# Patient Record
Sex: FEMALE | Race: WHITE | HISPANIC OR LATINO | Employment: OTHER | URBAN - METROPOLITAN AREA
[De-identification: names, ages, dates, MRNs, and addresses within clinical notes are randomized per-mention and may not be internally consistent; named-entity substitution may affect disease eponyms.]

---

## 2017-11-10 DIAGNOSIS — Z12.11 COLON CANCER SCREENING: ICD-10-CM

## 2018-02-26 ENCOUNTER — OFFICE VISIT (OUTPATIENT)
Dept: FAMILY MEDICINE | Facility: CLINIC | Age: 58
End: 2018-02-26
Payer: MEDICAID

## 2018-02-26 VITALS
DIASTOLIC BLOOD PRESSURE: 78 MMHG | HEIGHT: 67 IN | OXYGEN SATURATION: 96 % | BODY MASS INDEX: 33.89 KG/M2 | SYSTOLIC BLOOD PRESSURE: 118 MMHG | HEART RATE: 83 BPM | TEMPERATURE: 98 F | WEIGHT: 215.94 LBS

## 2018-02-26 DIAGNOSIS — M79.602 LEFT ARM PAIN: Primary | ICD-10-CM

## 2018-02-26 DIAGNOSIS — M25.512 ACUTE PAIN OF LEFT SHOULDER: ICD-10-CM

## 2018-02-26 PROCEDURE — 99213 OFFICE O/P EST LOW 20 MIN: CPT | Mod: PBBFAC,PO,25 | Performed by: FAMILY MEDICINE

## 2018-02-26 PROCEDURE — 99214 OFFICE O/P EST MOD 30 MIN: CPT | Mod: S$PBB,,, | Performed by: FAMILY MEDICINE

## 2018-02-26 PROCEDURE — 93010 ELECTROCARDIOGRAM REPORT: CPT | Mod: ,,, | Performed by: INTERNAL MEDICINE

## 2018-02-26 PROCEDURE — 99999 PR PBB SHADOW E&M-EST. PATIENT-LVL III: CPT | Mod: PBBFAC,,, | Performed by: FAMILY MEDICINE

## 2018-02-26 PROCEDURE — 93005 ELECTROCARDIOGRAM TRACING: CPT | Mod: PBBFAC,PO | Performed by: FAMILY MEDICINE

## 2018-02-26 PROCEDURE — 3008F BODY MASS INDEX DOCD: CPT | Mod: ,,, | Performed by: FAMILY MEDICINE

## 2018-02-26 RX ORDER — TIZANIDINE 2 MG/1
2 TABLET ORAL EVERY 6 HOURS PRN
Qty: 30 TABLET | Refills: 1 | Status: SHIPPED | OUTPATIENT
Start: 2018-02-26 | End: 2018-04-13

## 2018-02-26 NOTE — PROGRESS NOTES
Office Visit    Patient Name: Cloie Bodden Reyes    : 1960  MRN: [id number]      Assessment/Plan:  Cloie Bodden Reyes is a 57 y.o. female who presents today for :    Left arm pain  -     tiZANidine (ZANAFLEX) 2 MG tablet; Take 1 tablet (2 mg total) by mouth every 6 (six) hours as needed.  Dispense: 30 tablet; Refill: 1  -     EKG 12-lead    Acute pain of left shoulder    -activity modification d/w patient: avoid reaching overhead activities, avoid heavy lifting and provocative movements, alternate ice/heat/deep massage  -shoulder ROM stretching and strengthening exercises demonstrated in clinic and handouts given to patient  -RTC in 4-6 weeks if not better, or sooner if pain worsens.  -patient advised to use OTC Tylenol (325mg tablets) once every 4-6 hours as needed for pain - avoid regular use of NSAIDs due to potential GI/BP side effects (may take a short-course Aleve and then stop).   -EKG normal with NSR      Follow-up for worsening Sx. Urgent care/ED precautions provided, follow up with PCP for routine care.     This note was created by combination of typed  and Dragon dictation.  Transcription errors may be present.  If there are any questions, please contact me.        ----------------------------------------------------------------------------------------------------------------------      HPI:  Ciro is a 57 y.o. female who presents today for:    Arm Pain (Left ) and Shoulder Pain      This patient is new to me   This patient has multiple medical diagnoses as noted below.      Patient is here today for  Arm Pain (Left ) and Shoulder Pain  that started about a month ago, but started to worsen the past week.  Denies recent injury/trauma to area. No slips/falls.  Pain has a achy quality that has been on and off, worse at night and with doing household activities such as mopping - has stiffness in the Left shoulder that prevents her from raising her hand above head.  At worse, the pain is 6/10  - sometimes radiates to back of L shoulder blade, also has stiffness of Left posterior neck mm  Resting makes the pain better. No chest pain, claudication.  Pt is still able to perform daily routine    No wt loss/fatigue/malaise/BMs changes or urinary changes/tingling/numbness/weakness.  She has a history of intolerance to Ibuprofen, but had done well with Aleve in the past.        Additional ROS    No dysphagia/sore throat/rhinorrhea  No CP/SOB/palpitations/swelling  No cough/wheezing/SOB  No nausea/vomiting/abd pain  No rash, no history of allergies to any specific substances    There is no problem list on file for this patient.      Current Medications  Medications reviewed/updated.     Current Outpatient Prescriptions on File Prior to Visit   Medication Sig Dispense Refill    loratadine (CLARITIN) 10 mg tablet Take 1 tablet (10 mg total) by mouth daily as needed. 30 tablet 0    meclizine (ANTIVERT) 25 mg tablet Take 25 mg by mouth 3 (three) times daily as needed.      triamcinolone (NASACORT) 55 mcg nasal inhaler 2 sprays by Nasal route once daily.       No current facility-administered medications on file prior to visit.        Past Surgical History:   Procedure Laterality Date    HERNIA REPAIR         Family History   Problem Relation Age of Onset    Cancer Mother      kidney    Diabetes Father     Diabetes Sister     Stroke Brother     Clotting disorder Son     Diabetes Brother     No Known Problems Sister     No Known Problems Son        Social History     Social History    Marital status: Single     Spouse name: N/A    Number of children: N/A    Years of education: N/A     Occupational History    Not on file.     Social History Main Topics    Smoking status: Never Smoker    Smokeless tobacco: Never Used    Alcohol use No    Drug use: No    Sexual activity: Yes     Partners: Male     Other Topics Concern    Not on file     Social History Narrative    No narrative on file  "          Allergies   Review of patient's allergies indicates:   Allergen Reactions    Ibuprofen Other (See Comments)     Leg and hand             Review of Systems  See HPI      Physical Exam  /78   Pulse 83   Temp 98.3 °F (36.8 °C) (Oral)   Ht 5' 7" (1.702 m)   Wt 98 kg (215 lb 15.1 oz)   SpO2 96%   BMI 33.82 kg/m²     GEN: NAD, well developed, pleasant, well nourished  HEENT: NCAT, PERRLA, EOMI, sclera clear, anicteric, O/P clear, MMM with no lesions  NECK: normal, supple with midline trachea, no LAD, no thyromegaly  LUNGS: CTAB, no w/r/r, no increased work of breathing   HEART: RRR, normal S1 and S2, no m/r/g, no edema  ABD: s/nt/nd, NABS  SKIN: normal turgor, no rashes  PSYCH: AOx3, appropriate mood and affect  MSK: warm/well perfused, normal ROM in all 4 extremities, no c/c/e.  L shoulder with decreased ROM due to stiffness and pain, unable to bring L shoulder above horizontal. Normal Flexion/extention/adduction/empty can/push off/internal and external rotation.  No swelling/erythema/TTP.                 "

## 2018-02-28 ENCOUNTER — OFFICE VISIT (OUTPATIENT)
Dept: PODIATRY | Facility: CLINIC | Age: 58
End: 2018-02-28
Payer: MEDICAID

## 2018-02-28 VITALS
SYSTOLIC BLOOD PRESSURE: 122 MMHG | HEIGHT: 67 IN | BODY MASS INDEX: 33.91 KG/M2 | WEIGHT: 216.06 LBS | DIASTOLIC BLOOD PRESSURE: 80 MMHG

## 2018-02-28 DIAGNOSIS — M79.674 PAIN AROUND TOENAIL, RIGHT FOOT: ICD-10-CM

## 2018-02-28 DIAGNOSIS — B35.1 ONYCHOMYCOSIS DUE TO DERMATOPHYTE: Primary | ICD-10-CM

## 2018-02-28 PROCEDURE — 99203 OFFICE O/P NEW LOW 30 MIN: CPT | Mod: S$PBB,,, | Performed by: PODIATRIST

## 2018-02-28 PROCEDURE — 99213 OFFICE O/P EST LOW 20 MIN: CPT | Mod: PBBFAC,PO | Performed by: PODIATRIST

## 2018-02-28 PROCEDURE — 3008F BODY MASS INDEX DOCD: CPT | Mod: ,,, | Performed by: PODIATRIST

## 2018-02-28 PROCEDURE — 99999 PR PBB SHADOW E&M-EST. PATIENT-LVL III: CPT | Mod: PBBFAC,,, | Performed by: PODIATRIST

## 2018-02-28 NOTE — PROGRESS NOTES
Subjective:      Patient ID: Cloie Bodden Reyes is a 57 y.o. female.    Chief Complaint: Toe Pain (pcp Norma 2-26-18) and Nail Problem (R great toe/ tried otc/ unable to take Lamasil d/t abd sx)    Ciro is a 57 y.o. female who presents to the clinic complaining of thick and discolored toenails on the right foot especially the great toe. Has been bothering her for years. She has tried home remedies, vicks, over the counter antifungals as well as prescription topical antifungals. She has used all of them for a year straight. States that the nail becomes brittle while using them but it never clears up. The thickness hurts with every shoe. Files the nail down herself to help but still bothers her. She is unable to take oral lamisil due to abdominal issue and would like to avoid it due to its associated risks. Ciro is inquiring about other treatment options including possibly removing the entire toenail.       Review of Systems   Constitution: Negative for chills and fever.   Cardiovascular: Negative for chest pain, claudication and leg swelling.   Respiratory: Negative for cough and shortness of breath.    Skin: Positive for dry skin and nail changes.   Musculoskeletal:        Toenail pain   Gastrointestinal: Negative for nausea and vomiting.   Neurological: Negative for numbness and paresthesias.   Psychiatric/Behavioral: Negative for altered mental status.           Objective:      Physical Exam   Constitutional: She is oriented to person, place, and time. She appears well-developed and well-nourished.   HENT:   Head: Normocephalic.   Cardiovascular: Intact distal pulses.    Pulses:       Dorsalis pedis pulses are 2+ on the right side, and 2+ on the left side.        Posterior tibial pulses are 2+ on the right side, and 2+ on the left side.   CRT < 3 sec to tips of toes. No edema noted to b/l LE. No vericosities noted to b/l LEs.      Pulmonary/Chest: No respiratory distress.   Musculoskeletal:   Gastrocnemius  equinus noted to b/l ankles with decreased DF noted on exam. MMT 5/5 in DF/PF/Inv/Ev resistance with no reproduction of pain in any direction. Passive range of motion of ankle and pedal joints is painless. Adequate pedal joint ROM.     + pain, mild, with palpation of R great toenail.    Neurological: She is alert and oriented to person, place, and time. She has normal strength. A sensory deficit is present.   Light touch, proprioception, and sharp/dull sensation are all intact bilaterally.      Skin: Skin is warm, dry and intact. No erythema.   No open lesions, lacerations or wounds noted. Nails are thickened, elongated, discolored yellow/brown with subungual debris and brittleness to R 1,3-5. Interdigital spaces clean, dry and intact R. No erythema noted to R foot. Skin texture normal. Pedal hair normal. Skin temperature normal R foot.      Psychiatric: She has a normal mood and affect. Her behavior is normal. Judgment and thought content normal.   Vitals reviewed.            Assessment:       Encounter Diagnoses   Name Primary?    Onychomycosis due to dermatophyte Yes    Pain around toenail, right foot          Plan:       Ciro was seen today for toe pain and nail problem.    Diagnoses and all orders for this visit:    Onychomycosis due to dermatophyte    Pain around toenail, right foot      I counseled the patient on her conditions, their implications and medical management.      Discussed treatment options for fungal toenails including continuation of topical home remedies, topical OTC and Rx medications as well as oral Rx medications. We also discussed total removal of R great toenail permanently. We discussed all pros and cons of each treatment. She is leaning towards permanent removal of the nail due to the ongoing issue an ineffectiveness of topical treatments for last 6 years.     She will be re-scheduled for procedure due to lack of time today.     In the mean time, I advised her to wear wide shoes with  adequate space around toenails to prevent excessive pressure and pain. Keep nails filed short and thin at all times.     RTC for procedure

## 2018-04-13 ENCOUNTER — HOSPITAL ENCOUNTER (OUTPATIENT)
Dept: RADIOLOGY | Facility: HOSPITAL | Age: 58
Discharge: HOME | End: 2018-04-13
Attending: FAMILY MEDICINE
Payer: MEDICAID

## 2018-04-13 ENCOUNTER — OFFICE VISIT (OUTPATIENT)
Dept: FAMILY MEDICINE | Facility: CLINIC | Age: 58
End: 2018-04-13
Payer: MEDICAID

## 2018-04-13 VITALS
TEMPERATURE: 98 F | BODY MASS INDEX: 33.55 KG/M2 | WEIGHT: 213.75 LBS | DIASTOLIC BLOOD PRESSURE: 82 MMHG | SYSTOLIC BLOOD PRESSURE: 120 MMHG | HEIGHT: 67 IN | OXYGEN SATURATION: 97 % | HEART RATE: 73 BPM

## 2018-04-13 DIAGNOSIS — Z12.31 ENCOUNTER FOR SCREENING MAMMOGRAM FOR BREAST CANCER: ICD-10-CM

## 2018-04-13 DIAGNOSIS — Z11.59 NEED FOR HEPATITIS C SCREENING TEST: ICD-10-CM

## 2018-04-13 DIAGNOSIS — M79.602 LEFT ARM PAIN: Primary | ICD-10-CM

## 2018-04-13 DIAGNOSIS — L30.9 DERMATITIS: ICD-10-CM

## 2018-04-13 DIAGNOSIS — Z01.419 ENCOUNTER FOR GYNECOLOGICAL EXAMINATION WITHOUT ABNORMAL FINDING: ICD-10-CM

## 2018-04-13 PROCEDURE — 99213 OFFICE O/P EST LOW 20 MIN: CPT | Mod: PBBFAC,PO | Performed by: FAMILY MEDICINE

## 2018-04-13 PROCEDURE — 77067 SCR MAMMO BI INCL CAD: CPT | Mod: TC,PO

## 2018-04-13 PROCEDURE — 99999 PR PBB SHADOW E&M-EST. PATIENT-LVL III: CPT | Mod: PBBFAC,,, | Performed by: FAMILY MEDICINE

## 2018-04-13 PROCEDURE — 77063 BREAST TOMOSYNTHESIS BI: CPT | Mod: 26,,, | Performed by: RADIOLOGY

## 2018-04-13 PROCEDURE — 99214 OFFICE O/P EST MOD 30 MIN: CPT | Mod: S$PBB,,, | Performed by: FAMILY MEDICINE

## 2018-04-13 PROCEDURE — 77067 SCR MAMMO BI INCL CAD: CPT | Mod: 26,,, | Performed by: RADIOLOGY

## 2018-04-13 RX ORDER — TRIAMCINOLONE ACETONIDE 1 MG/G
CREAM TOPICAL 2 TIMES DAILY
Qty: 45 G | Refills: 0 | Status: SHIPPED | OUTPATIENT
Start: 2018-04-13 | End: 2018-04-23

## 2018-04-13 RX ORDER — METHYLPREDNISOLONE 4 MG/1
TABLET ORAL
Qty: 1 PACKAGE | Refills: 0 | Status: SHIPPED | OUTPATIENT
Start: 2018-04-13 | End: 2018-05-04

## 2018-04-18 ENCOUNTER — OFFICE VISIT (OUTPATIENT)
Dept: PODIATRY | Facility: CLINIC | Age: 58
End: 2018-04-18
Payer: MEDICAID

## 2018-04-18 VITALS — WEIGHT: 213 LBS | HEIGHT: 67 IN | BODY MASS INDEX: 33.43 KG/M2

## 2018-04-18 DIAGNOSIS — M79.674 TOE PAIN, RIGHT: ICD-10-CM

## 2018-04-18 DIAGNOSIS — B35.1 ONYCHOMYCOSIS: Primary | ICD-10-CM

## 2018-04-18 DIAGNOSIS — L60.0 INGROWN RIGHT BIG TOENAIL: ICD-10-CM

## 2018-04-18 PROCEDURE — 99999 PR PBB SHADOW E&M-EST. PATIENT-LVL III: CPT | Mod: PBBFAC,,, | Performed by: PODIATRIST

## 2018-04-18 PROCEDURE — 99213 OFFICE O/P EST LOW 20 MIN: CPT | Mod: S$PBB,25,, | Performed by: PODIATRIST

## 2018-04-18 PROCEDURE — 11750 EXCISION NAIL&NAIL MATRIX: CPT | Mod: PBBFAC,PO | Performed by: PODIATRIST

## 2018-04-18 PROCEDURE — 99213 OFFICE O/P EST LOW 20 MIN: CPT | Mod: PBBFAC,PO | Performed by: PODIATRIST

## 2018-04-18 RX ORDER — ACETAMINOPHEN AND CODEINE PHOSPHATE 300; 30 MG/1; MG/1
1 TABLET ORAL EVERY 8 HOURS PRN
Qty: 15 TABLET | Refills: 0 | Status: SHIPPED | OUTPATIENT
Start: 2018-04-18 | End: 2020-10-02

## 2018-04-18 NOTE — PATIENT INSTRUCTIONS
"  AFTER TOENAIL PROCEDURE INSTRUCTIONS    1. Leave bandage intact until you shower (12-24 hours). If the bandage sticks as you try to remove it, soak it in warm water until it lifts off.    2. Dry foot completely after showering, and apply a small amount of triple antibiotic ointment (Neosporin works fine!) and a fabric or cloth bandaid ("plastic" bandaids tend to lift off with ointment use).  Wear open-toed shoes as needed for comfort.     3. Take Advil or Tylenol as needed for pain.     4. Your toe may drain for the next few days. Normal drainage is yellow-to-pink, and clear, much like the fluid in a blister. Watch for redness spreading up your toe into your foot, white thick drainage (pus), pain unrelieved by medication, or nausea/vomiting/fever/chills. These are signs of infection. Please call the clinic or visit your doctor.      "

## 2018-04-18 NOTE — PROGRESS NOTES
Subjective:      Patient ID: Cloie Bodden Reyes is a 57 y.o. female.    Chief Complaint: Ingrown Toenail (right foot great toe)    Ciro is a 57 y.o. female who presents to the clinic complaining of thick and discolored toenails on the right foot especially the great toe. Has been bothering her for years. She has tried home remedies, vicks, over the counter antifungals as well as prescription topical antifungals. She has used all of them for a year straight. States that the nail becomes brittle while using them but it never clears up. The thickness hurts with every shoe. Files the nail down herself to help but still bothers her. She is unable to take oral lamisil due to abdominal issue and would like to avoid it due to its associated risks. Ciro is inquiring about other treatment options including possibly removing the entire toenail.     04/18/2018: returns to clinic for follow up of painful R great toenail due to fungus and ingrowth. She is here today to discuss permanent removal of the toenail due to the ongoing pain and problems she has had with it. No other concerns today.     Review of Systems   Constitution: Negative for chills and fever.   Cardiovascular: Negative for chest pain, claudication and leg swelling.   Respiratory: Negative for cough and shortness of breath.    Skin: Positive for dry skin and nail changes.   Musculoskeletal:        Toenail pain   Gastrointestinal: Negative for nausea and vomiting.   Neurological: Negative for numbness and paresthesias.   Psychiatric/Behavioral: Negative for altered mental status.           Objective:      Physical Exam   Constitutional: She is oriented to person, place, and time. She appears well-developed and well-nourished.   HENT:   Head: Normocephalic.   Cardiovascular: Intact distal pulses.    Pulses:       Dorsalis pedis pulses are 2+ on the right side, and 2+ on the left side.        Posterior tibial pulses are 2+ on the right side, and 2+ on the left side.    CRT < 3 sec to tips of toes. No edema noted to b/l LE. No vericosities noted to b/l LEs.      Pulmonary/Chest: No respiratory distress.   Musculoskeletal:   Gastrocnemius equinus noted to b/l ankles with decreased DF noted on exam. MMT 5/5 in DF/PF/Inv/Ev resistance with no reproduction of pain in any direction. Passive range of motion of ankle and pedal joints is painless. Adequate pedal joint ROM.     + pain, mild, with palpation of R great toenail.    Neurological: She is alert and oriented to person, place, and time. She has normal strength. A sensory deficit is present.   Light touch, proprioception, and sharp/dull sensation are all intact bilaterally.      Skin: Skin is warm, dry and intact. No bruising, no ecchymosis and no lesion noted. No erythema.   No open lesions, lacerations or wounds noted. Nail is thickened, elongated, discolored yellow/brown with subungual debris and brittleness to R great toe with incurvated borders b/l R great toe. Interdigital spaces clean, dry and intact R. No erythema noted to R foot or great toe. Skin texture normal. Pedal hair normal. Skin temperature normal R foot.      Psychiatric: She has a normal mood and affect. Her behavior is normal. Judgment and thought content normal.   Vitals reviewed.            Assessment:       Encounter Diagnoses   Name Primary?    Onychomycosis - Right Foot Yes    Ingrown right big toenail     Toe pain, right          Plan:       Ciro was seen today for ingrown toenail.    Diagnoses and all orders for this visit:    Onychomycosis - Right Foot    Ingrown right big toenail    Toe pain, right    Other orders  -     acetaminophen-codeine 300-30mg (TYLENOL #3) 300-30 mg Tab; Take 1 tablet by mouth every 8 (eight) hours as needed. For toenail removal procedure.      I counseled the patient on her conditions, their implications and medical management.    Discussed the options of trimming and filing nail, slant back vs permanent removal of  offending corners of nail. Patient opted for more permanent solution due to recurrent issues with the nail for the past 6 years. All alternatives, risks and complications were discussed in detail with patient regarding phenol matrixectomy of entire toenail. Patient elected for this procedure on the entire R great toenail. Informed consent was discussed in detail and signed/witnessed. Procedure note separate.     We discussed risks including redness, pain, drainage, blistering, recurrence, need for further surgery, infection, delayed/non healing, loss of function of toe, loss of toe, chronic pain among other not listed on the consent form.     Tylenol #3 for pain as needed q8h. 15 pills prescribed for procedure only.     Home care instructions provided.     F/u 10 days, sooner PRN

## 2018-04-18 NOTE — PROCEDURES
Nail Removal  Date/Time: 4/18/2018 5:11 PM  Performed by: KATHLEEN ESQUIVEL  Authorized by: KATHLEEN ESQUIVEL     Consent Done?:  Yes (Written)    Location:  Right foot  Anesthesia:  Digital block  * local anesthetic: 1:1 mix of 0.5% marcaine plain + 2% lidocaine plain.  Anesthetic total (ml):  4  Preparation:  Skin prepped with Betadine    Amount removed:  Complete  Wedge excision of skin of nail fold: No    Nail bed sutured?: No    Nail matrix removed:  Complete (Phenol)  Removed nail replaced and anchored: No    Dressing applied:  4x4, gauze roll, antibiotic ointment and dressing applied  Patient tolerance:  Patient tolerated the procedure well with no immediate complications

## 2018-05-02 ENCOUNTER — OFFICE VISIT (OUTPATIENT)
Dept: PODIATRY | Facility: CLINIC | Age: 58
End: 2018-05-02
Payer: MEDICAID

## 2018-05-02 VITALS — HEIGHT: 67 IN | WEIGHT: 213 LBS | BODY MASS INDEX: 33.43 KG/M2

## 2018-05-02 DIAGNOSIS — Z98.890 POST-OPERATIVE STATE: Primary | ICD-10-CM

## 2018-05-02 PROCEDURE — 99999 PR PBB SHADOW E&M-EST. PATIENT-LVL II: CPT | Mod: PBBFAC,,, | Performed by: PODIATRIST

## 2018-05-02 PROCEDURE — 99024 POSTOP FOLLOW-UP VISIT: CPT | Mod: ,,, | Performed by: PODIATRIST

## 2018-05-02 PROCEDURE — 99212 OFFICE O/P EST SF 10 MIN: CPT | Mod: PBBFAC,PO | Performed by: PODIATRIST

## 2018-05-02 NOTE — PROGRESS NOTES
"SUBJECTIVE: Patient returns to the clinic 2 weeks status post right great toe total phenol nail procedure/removal.  Patient has no complaints of fever chills or sweats.  Minimal to no pain currently. Had severe pain 1-5 days after surgery and started applying "betader" which is a betamethasone ointment she got from [facility]. States this helped improve her pain down to 4/10 currently.  Patient has been dressing as instructed with "Betaderm" and bandaid daily.     Physical examination: General: Pt. is in no acute distress, alert and oriented x 3.    Podiatric examination: Vascular: Palpable pedal pulses b/l. Capillary refill time is within normal limits to surgical foot.   Dermatologic: Surgical sites are clean without any signs of infection. Minimal drainage. Mild serosanguinous crusting to toenail borders with healing nail bed, granular, 50% healed. No erythema, active drainage or purulence or other SOI. Healing well.     IMPRESSION: 2 weeks postop R hallux phenol nail avulsion with excellent progress no signs of infection.    PLAN: With patients permission, a sterile soft tissue curette was used to remove debris and fibrous slough from bail bed of right hallux. Tolerated well. Applied "endoform" ointment (patient brought it) and bandaid today. Patient to continue local care with above medication and bandaid daily until completely healed with no drainage and no redness--likely additional 1-2 weeks or sooner.  Patient should call the clinic immediately if any signs of infection such as fever chills sweats increased redness or pain but was otherwise discharged.    RTC as needed if any problems arise.         "

## 2018-05-11 ENCOUNTER — OFFICE VISIT (OUTPATIENT)
Dept: OBSTETRICS AND GYNECOLOGY | Facility: CLINIC | Age: 58
End: 2018-05-11
Payer: MEDICAID

## 2018-05-11 VITALS
WEIGHT: 218.25 LBS | SYSTOLIC BLOOD PRESSURE: 125 MMHG | BODY MASS INDEX: 34.26 KG/M2 | DIASTOLIC BLOOD PRESSURE: 69 MMHG | HEIGHT: 67 IN

## 2018-05-11 DIAGNOSIS — Z01.419 ENCOUNTER FOR WELL WOMAN EXAM WITH ROUTINE GYNECOLOGICAL EXAM: Primary | ICD-10-CM

## 2018-05-11 DIAGNOSIS — Z12.4 ENCOUNTER FOR PAPANICOLAOU SMEAR FOR CERVICAL CANCER SCREENING: ICD-10-CM

## 2018-05-11 PROCEDURE — 99386 PREV VISIT NEW AGE 40-64: CPT | Mod: S$PBB,,, | Performed by: OBSTETRICS & GYNECOLOGY

## 2018-05-11 PROCEDURE — 87624 HPV HI-RISK TYP POOLED RSLT: CPT

## 2018-05-11 PROCEDURE — 99213 OFFICE O/P EST LOW 20 MIN: CPT | Mod: PBBFAC | Performed by: OBSTETRICS & GYNECOLOGY

## 2018-05-11 PROCEDURE — 99999 PR PBB SHADOW E&M-EST. PATIENT-LVL III: CPT | Mod: PBBFAC,,, | Performed by: OBSTETRICS & GYNECOLOGY

## 2018-05-11 PROCEDURE — 88175 CYTOPATH C/V AUTO FLUID REDO: CPT

## 2018-05-11 NOTE — PROGRESS NOTES
SUBJECTIVE:   Cloie Bodden Reyes is a 57 y.o. female   for annual well woman exam.   LMP 10/2017.      Perimenopausal. LMP 7 months ago    Past Medical History:   Diagnosis Date    Allergy     Fibrocystic breast      Past Surgical History:   Procedure Laterality Date    HERNIA REPAIR      VAGINAL DELIVERY       Social History     Social History    Marital status: Single     Spouse name: N/A    Number of children: N/A    Years of education: N/A     Occupational History    Not on file.     Social History Main Topics    Smoking status: Never Smoker    Smokeless tobacco: Never Used    Alcohol use No    Drug use: No    Sexual activity: Yes     Partners: Male     Other Topics Concern    Not on file     Social History Narrative    No narrative on file     Family History   Problem Relation Age of Onset    Cancer Mother         kidney    Endometrial cancer Mother     Diabetes Father     Diabetes Sister     Stroke Brother     Clotting disorder Son     Diabetes Brother     No Known Problems Sister     No Known Problems Son     Endometrial cancer Maternal Grandmother      OB History    Para Term  AB Living   2 2 2         SAB TAB Ectopic Multiple Live Births                  # Outcome Date GA Lbr Jovanny/2nd Weight Sex Delivery Anes PTL Lv   2 Term            1 Term               Obstetric Comments   Perimenopausal    Denies abnl pap, last pap    Denies abnl MMG,    c-scope in 2017.         Current Outpatient Prescriptions   Medication Sig Dispense Refill    acetaminophen-codeine 300-30mg (TYLENOL #3) 300-30 mg Tab Take 1 tablet by mouth every 8 (eight) hours as needed. For toenail removal procedure. 15 tablet 0    triamcinolone (NASACORT) 55 mcg nasal inhaler 2 sprays by Nasal route once daily.      loratadine (CLARITIN) 10 mg tablet Take 1 tablet (10 mg total) by mouth daily as needed. 30 tablet 0     No current facility-administered medications for this visit.      Allergies:  "Ibuprofen       ROS:  GENERAL: Denies weight gain or weight loss. Feeling well overall.   SKIN: Denies rash or lesions.   HEAD: Denies head injury or headache.   NODES: Denies enlarged lymph nodes.   CHEST: Denies chest pain or shortness of breath.   CARDIOVASCULAR: Denies palpitations or left sided chest pain.   ABDOMEN: No abdominal pain, constipation, diarrhea, nausea, vomiting or rectal bleeding.   URINARY: No frequency, dysuria, hematuria, or burning on urination.  REPRODUCTIVE: Denies vaginal discharge, abnormal vaginal bleeding, lesions, pelvic pain  BREASTS: The patient performs breast self-examination and denies pain, lumps, or nipple discharge.   HEMATOLOGIC: No easy bruisability or excessive bleeding.  MUSCULOSKELETAL: Denies joint pain or swelling.   NEUROLOGIC: Denies syncope or weakness.   PSYCHIATRIC: Denies depression, anxiety or mood swings.      OBJECTIVE:   /69   Ht 5' 7" (1.702 m)   Wt 99 kg (218 lb 4.1 oz)   LMP 05/11/2017 (Approximate)   BMI 34.18 kg/m²   The patient appears well, alert, oriented x 3, in no distress.  PSYCH:  Normal, full range of affect  NECK: negative, no thyromegaly, trachea midline  SKIN: normal, good color, good turgor and no acne, striae, hirsutism  BREAST EXAM: breasts appear normal, no suspicious masses, no skin or nipple changes or axillary nodes  ABDOMEN: soft, non-tender; bowel sounds normal; no masses,  no organomegaly and no hernias, masses, or hepatosplenomegaly  GENITALIA: normal external genitalia, no erythema, no discharge  URETHRA: normal appearing urethra with no masses, tenderness or lesions and normal urethra, normal urethral meatus  VAGINA: Normal  CERVIX: no lesions or cervical motion tenderness  UTERUS: retroverted  ADNEXA: no mass, fullness, tenderness      ASSESSMENT:   1. Health maintenance  -pap done. Discussed ASCCP guideline screening every 3 - 5 years.   -screening MMG up to date  -colonoscopy up to date  -counseled on exercise and " healthy diet, weight loss  -bone health:  Discussed Vitamin D and Calcium supplementation, weight bearing exercises

## 2018-05-11 NOTE — LETTER
May 11, 2018      Michael Green MD  [address] [facility] [address]           [facility]  [address] [facility] [address]  [facility] [address]  Phone: [phone]          Patient: Cloie Bodden Reyes   MR Number: [id number]   YOB: 1960   Date of Visit: 5/11/2018       Dear Dr. Michael Green:    Thank you for referring Cloie Reyes to me for evaluation. Attached you will find relevant portions of my assessment and plan of care.    If you have questions, please do not hesitate to call me. I look forward to following Cloie Reyes along with you.    Sincerely,    Mary Malik MD    Enclosure  CC:  No Recipients    If you would like to receive this communication electronically, please contact [email or web address] or [phone] to request more information on [facility] Link access.    For providers and/or their staff who would like to refer a patient to [facility], please contact us through our one-stop-shop provider referral line, [facility], at [phone].    If you feel you have received this communication in error or would no longer like to receive these types of communications, please e-mail [email or web address]

## 2018-05-18 LAB
HPV16 AG SPEC QL: NEGATIVE
HPV16+18+H RISK 12 DNA CVX-IMP: NEGATIVE
HPV18 DNA SPEC QL NAA+PROBE: NEGATIVE

## 2018-12-04 ENCOUNTER — TELEPHONE (OUTPATIENT)
Dept: FAMILY MEDICINE | Facility: CLINIC | Age: 58
End: 2018-12-04

## 2018-12-04 ENCOUNTER — LAB VISIT (OUTPATIENT)
Dept: LAB | Facility: HOSPITAL | Age: 58
End: 2018-12-04
Attending: FAMILY MEDICINE
Payer: MEDICAID

## 2018-12-04 DIAGNOSIS — Z12.11 SCREEN FOR COLON CANCER: Primary | ICD-10-CM

## 2018-12-04 DIAGNOSIS — Z12.11 SCREEN FOR COLON CANCER: ICD-10-CM

## 2018-12-04 PROCEDURE — 82274 ASSAY TEST FOR BLOOD FECAL: CPT

## 2018-12-05 LAB — HEMOCCULT STL QL IA: NEGATIVE

## 2019-01-31 ENCOUNTER — TELEPHONE (OUTPATIENT)
Dept: FAMILY MEDICINE | Facility: CLINIC | Age: 59
End: 2019-01-31

## 2019-01-31 ENCOUNTER — HOSPITAL ENCOUNTER (OUTPATIENT)
Dept: RADIOLOGY | Facility: HOSPITAL | Age: 59
Discharge: HOME | End: 2019-01-31
Attending: NURSE PRACTITIONER
Payer: MEDICAID

## 2019-01-31 ENCOUNTER — OFFICE VISIT (OUTPATIENT)
Dept: FAMILY MEDICINE | Facility: CLINIC | Age: 59
End: 2019-01-31
Payer: MEDICAID

## 2019-01-31 VITALS
SYSTOLIC BLOOD PRESSURE: 124 MMHG | HEART RATE: 69 BPM | TEMPERATURE: 98 F | DIASTOLIC BLOOD PRESSURE: 86 MMHG | WEIGHT: 218 LBS | OXYGEN SATURATION: 98 % | BODY MASS INDEX: 34.14 KG/M2

## 2019-01-31 DIAGNOSIS — H65.93 FLUID LEVEL BEHIND TYMPANIC MEMBRANE OF BOTH EARS: ICD-10-CM

## 2019-01-31 DIAGNOSIS — R42 DIZZINESS: Primary | ICD-10-CM

## 2019-01-31 DIAGNOSIS — R07.9 CHEST PAIN, UNSPECIFIED TYPE: ICD-10-CM

## 2019-01-31 PROCEDURE — 99999 PR PBB SHADOW E&M-EST. PATIENT-LVL III: ICD-10-PCS | Mod: PBBFAC,,, | Performed by: NURSE PRACTITIONER

## 2019-01-31 PROCEDURE — 71046 X-RAY EXAM CHEST 2 VIEWS: CPT | Mod: TC,FY,PO

## 2019-01-31 PROCEDURE — 99214 OFFICE O/P EST MOD 30 MIN: CPT | Mod: S$PBB,,, | Performed by: NURSE PRACTITIONER

## 2019-01-31 PROCEDURE — 71046 XR CHEST PA AND LATERAL: ICD-10-PCS | Mod: 26,,, | Performed by: RADIOLOGY

## 2019-01-31 PROCEDURE — 99213 OFFICE O/P EST LOW 20 MIN: CPT | Mod: PBBFAC,PO,25 | Performed by: NURSE PRACTITIONER

## 2019-01-31 PROCEDURE — 93010 EKG 12-LEAD: ICD-10-PCS | Mod: S$PBB,,, | Performed by: INTERNAL MEDICINE

## 2019-01-31 PROCEDURE — 99214 PR OFFICE/OUTPT VISIT, EST, LEVL IV, 30-39 MIN: ICD-10-PCS | Mod: S$PBB,,, | Performed by: NURSE PRACTITIONER

## 2019-01-31 PROCEDURE — 93005 ELECTROCARDIOGRAM TRACING: CPT | Mod: PBBFAC,PO | Performed by: INTERNAL MEDICINE

## 2019-01-31 PROCEDURE — 71046 X-RAY EXAM CHEST 2 VIEWS: CPT | Mod: 26,,, | Performed by: RADIOLOGY

## 2019-01-31 PROCEDURE — 99999 PR PBB SHADOW E&M-EST. PATIENT-LVL III: CPT | Mod: PBBFAC,,, | Performed by: NURSE PRACTITIONER

## 2019-01-31 PROCEDURE — 93010 ELECTROCARDIOGRAM REPORT: CPT | Mod: S$PBB,,, | Performed by: INTERNAL MEDICINE

## 2019-01-31 RX ORDER — MECLIZINE HYDROCHLORIDE 25 MG/1
25 TABLET ORAL 3 TIMES DAILY PRN
Qty: 90 TABLET | Refills: 0 | Status: SHIPPED | OUTPATIENT
Start: 2019-01-31 | End: 2020-10-02

## 2019-01-31 NOTE — PROGRESS NOTES
Subjective:       Patient ID: Cloie Bodden Reyes is a 58 y.o. female.    Chief Complaint: Chest Pain (chest pain,dizziness,headaches X yesterday )    Dizziness:   Chronicity:  New  Onset:  1 to 4 weeks ago (about 2 weeks)  Progression since onset:  Gradually worsening and unchanged  Duration:  Off/on all day  Dizziness characteristics:  Off-balance and spinning inside head only  Frequency of Spells:  Daily   Associated symptoms: ear pain, headaches, nausea, visual disturbances and light-headedness.no hearing loss, no fever, no tinnitus, no vomiting, no diaphoresis, no palpitations, no slurred speech and no chest pain.  Aggravated by:  Nothing  Treatments tried: aspirin for chest discomfort.      Past Medical History:   Diagnosis Date    Allergy     Fibrocystic breast        Social History     Socioeconomic History    Marital status: Single     Spouse name: Not on file    Number of children: Not on file    Years of education: Not on file    Highest education level: Not on file   Social Needs    Financial resource strain: Not on file    Food insecurity - worry: Not on file    Food insecurity - inability: Not on file    Transportation needs - medical: Not on file    Transportation needs - non-medical: Not on file   Occupational History    Not on file   Tobacco Use    Smoking status: Never Smoker    Smokeless tobacco: Never Used   Substance and Sexual Activity    Alcohol use: No     Alcohol/week: 0.0 oz    Drug use: No    Sexual activity: Yes     Partners: Male   Other Topics Concern    Not on file   Social History Narrative    Not on file       Past Surgical History:   Procedure Laterality Date    HERNIA REPAIR      VAGINAL DELIVERY         Review of Systems   Constitutional: Negative for chills, diaphoresis, fatigue and fever.   HENT: Positive for ear pain, sinus pressure and sneezing. Negative for congestion, hearing loss, postnasal drip, rhinorrhea, sore throat and tinnitus.    Eyes: Positive  for itching.   Respiratory: Negative for choking, chest tightness, shortness of breath and wheezing.    Cardiovascular: Negative for chest pain, palpitations and leg swelling.   Gastrointestinal: Positive for nausea. Negative for vomiting.   Neurological: Positive for dizziness, light-headedness and headaches.   All other systems reviewed and are negative.      Objective:   /86 (BP Location: Right arm, Patient Position: Sitting, BP Method: Large (Manual))   Pulse 69   Temp 97.8 °F (36.6 °C)   Wt 98.9 kg (218 lb)   SpO2 98%   BMI 34.14 kg/m²       Physical Exam   Constitutional: She is oriented to person, place, and time. She appears well-developed and well-nourished.   HENT:   Head: Normocephalic and atraumatic.   Right Ear: Hearing, external ear and ear canal normal. A middle ear effusion is present.   Left Ear: Hearing, external ear and ear canal normal. A middle ear effusion is present.   Nose: No rhinorrhea.   Mouth/Throat: No oropharyngeal exudate, posterior oropharyngeal edema or posterior oropharyngeal erythema.   Cardiovascular: Normal rate, regular rhythm, normal heart sounds and normal pulses.   Pulmonary/Chest: Effort normal. No stridor. No respiratory distress. She has decreased breath sounds in the right lower field and the left lower field. She has no wheezes. She has no rhonchi. She has no rales.   Musculoskeletal: Normal range of motion.   Lymphadenopathy:     She has no cervical adenopathy.   Neurological: She is alert and oriented to person, place, and time.   Skin: She is not diaphoretic. No pallor.   Psychiatric: She has a normal mood and affect. Her speech is normal and behavior is normal.       Assessment:       1. Dizziness    2. Chest pain, unspecified type    3. Fluid level behind tympanic membrane of both ears        Plan:       Ciro was seen today for chest pain.    Diagnoses and all orders for this visit:    Dizziness  -     CBC auto differential; Future  -     Comprehensive  metabolic panel; Future        -     meclizine (ANTIVERT) 25 mg tablet; Take 1 tablet (25 mg total) by mouth 3 (three) times daily as needed.    Chest pain, unspecified type  -     X-Ray Chest PA And Lateral; Future  -     IN OFFICE EKG 12-LEAD (to Muse)  -     CBC auto differential; Future  -     Comprehensive metabolic panel; Future  -     Troponin I; Future  -     CK; Future    Other orders  -     meclizine (ANTIVERT) 25 mg tablet; Take 1 tablet (25 mg total) by mouth 3 (three) times daily as needed.    Dizziness likely related to fluid level. EKG normal in clinic. Will get labs and chest xray due to diminished lung sounds. Will follow up once results began to return. Encouraged to make follow up appt with PCP. She is to go to the ER for shortness of breath, dizziness, headache or worsening chest pain.     Follow-up if symptoms worsen or fail to improve.

## 2019-01-31 NOTE — TELEPHONE ENCOUNTER
----- Message from HALEY Swartz sent at 1/31/2019  2:05 PM CST -----  Please inform patient her blood count is normal.

## 2019-01-31 NOTE — TELEPHONE ENCOUNTER
----- Message from KATHARINE Swartz-TITA sent at 1/31/2019 10:51 AM CST -----  Please inform patient her chest xray is normal.

## 2019-01-31 NOTE — PATIENT INSTRUCTIONS
Uncertain Causes of Chest Pain    Chest pain can happen for a number of reasons. Sometimes the cause can't be determined. If your condition does not seem serious, and your pain does not appear to be coming from your heart, your healthcare provider may recommend watching it closely. Sometimes the signs of a serious problem take more time to appear. Many problems not related to your heart can cause chest pain.These include:  · Musculoskeletal. Costochondritis, an inflammation of the tissues around the ribs that can occur from trauma or overuse injuries  · Respiratory. Pneumonia, pneumothorax, or pneumonitis (inflammation of the lining of the chest and lungs)  · Gastrointestinal. Esophageal reflux, heartburn, or gallbladder disease  · Anxiety and panic disorders  · Nerve compression and neuritis  · Miscellaneous problems such as aortic aneurysm or pulmonary embolism (a blood clot in the lungs)  Home care  After your visit, follow these recommendations:  · Rest today and avoid strenuous activity.  · Take any prescribed medicine as directed.  · Be aware of any recurrent chest pain and notice any changes  Follow-up care  Follow up with your healthcare provider if you do not start to feel better within 24 hours, or as advised.  Call 911  Call 911 if any of these occur:  · A change in the type of pain: if it feels different, becomes more severe, lasts longer, or begins to spread into your shoulder, arm, neck, jaw or back  · Shortness of breath or increased pain with breathing  · Weakness, dizziness, or fainting  · Rapid heart beat  · Crushing sensation in your chest  When to seek medical advice  Call your healthcare provider right away if any of the following occur:  · Cough with dark colored sputum (phlegm) or blood  · Fever of 100.4ºF (38ºC) or higher, or as directed by your healthcare provider  · Swelling, pain or redness in one leg  · Shortness of breath  Date Last Reviewed: 12/30/2015  © 9084-2553 [facility]  [employer]. [address] [place] [address], [place] [address]. All rights reserved. This information is not intended as a substitute for professional medical care. Always follow your healthcare professional's instructions.        Noncardiac Chest Pain    Based on your visit today, the healthcare provider doesnt know what is causing your chest pain. In most cases, people who come to the emergency department with chest pain dont have a problem with their heart. Instead, the pain is caused by other conditions. It's important for the healthcare team to be sure you are not having a life threatening cause for chest pain such as a heart attack, blood clot in the lungs, collapsed lung, ruptured esophagus, or tearing of the aorta. Once these major causes have been ruled out, you may have further evaluation for non-heart causes of chest pain. These may be problems with the lungs, muscles, bones, digestive tract, nerves, or mental health.  Lung problems  · Inflammation around the lungs (pleurisy)  · Collapsed lung (pneumothorax)  · Fluid around the lungs (pleural effusion)  · Lung cancer (a rare cause of chest pain)  Muscle or bone problems  · Inflamed cartilage between the ribs (costochondritis)  · Fibromyalgia  · Rheumatoid arthritis  · Chest wall strain  Digestive system problems  · Reflux  · Stomach ulcer  · Spasms of the esophagus  · Gall stones  · Gallbladder inflammation  Mental health conditions  · Panic or anxiety attacks  · Emotional distress  Your condition doesnt seem serious and your pain doesnt appear to be coming from your heart. But sometimes the signs of a serious problem take more time to appear. Watch for the warning signs listed below.  Home care  Follow these guidelines when caring for yourself at home:  · Rest today and avoid strenuous activity.  · Take any prescribed medicine as directed.  Follow-up care  Follow up with your healthcare provider, or as advised, if you dont start to feel better within 24  hours.  When to seek medical advice  Call your healthcare provider right away if any of these occur:  · A change in the type of pain. Call if it feels different, becomes more serious, lasts longer, or begins to spread into your shoulder, arm, neck, jaw, or back.  · Shortness of breath  · You feel more pain when you breathe  · Cough with dark-colored mucus or blood  · Weakness, dizziness, or fainting  · Fever of 100.4ºF (38ºC) or higher, or as directed by your healthcare provider  · Swelling, pain, or redness in one leg  Date Last Reviewed: 12/1/2016  © 6911-8819 [employer]. [address] [place] [address], [place] [address]. All rights reserved. This information is not intended as a substitute for professional medical care. Always follow your healthcare professional's instructions.        Dizziness (Uncertain Cause)  Dizziness is a common symptom. It may be described as lightheadedness, spinning, or feeling like you are going to faint. Dizziness can have many causes.  Be sure to tell the healthcare provider about:  · All medicines you take, including prescription, over-the-counter, herbs, and supplements  · Any other symptoms you have  · Any health problems you are being treated for  · Anything that causes the dizziness to get worse or better  Today's exam did not show an exact cause for your dizziness. Other tests may be needed. Follow up with your healthcare provider.  Home care  · Dizziness that occurs with sudden standing may be a sign of mild dehydration. Drink extra fluids for the next few days.  · If you recently started a new medicine, stopped a medicine, or had the dose of a current medicine changed, talk with the prescribing healthcare provider. Your medicine plan may need adjustment.  · If dizziness lasts more than a few seconds, sit or lie down until it passes. This may help prevent injury in case you pass out.  · Do not drive or use power tools or dangerous equipment until you have had no  dizziness for at least 48 hours.  Follow-up care  Follow up with your healthcare provider for further evaluation within the next 7 days or as advised.  When to seek medical advice  Call your healthcare provider for any of the following:  · Worsening of symptoms or new symptoms  · Passing out or seizure  · Repeated vomiting  · Headache  · Palpitations (the sense that your heart is fluttering or beating fast or hard)  · Shortness of breath  · Blood in vomit or stool (black or red color)  · Weakness of an arm or leg or one side of the face  · Vision or hearing changes  · Trouble walking or speaking  · Chest, arm, neck, back, or jaw pain  Date Last Reviewed: 8/23/2015  © 8005-8152 [employer]. [address] [place] [address], [place] [address]. All rights reserved. This information is not intended as a substitute for professional medical care. Always follow your healthcare professional's instructions.

## 2019-02-01 ENCOUNTER — TELEPHONE (OUTPATIENT)
Dept: FAMILY MEDICINE | Facility: CLINIC | Age: 59
End: 2019-02-01

## 2019-02-01 NOTE — TELEPHONE ENCOUNTER
----- Message from HALEY Swartz sent at 1/31/2019  4:13 PM CST -----  Please inform patient her cardiac labs are normal. Her kidney and liver labs are normal. Her electrolytes are normal.

## 2019-03-21 ENCOUNTER — PATIENT OUTREACH (OUTPATIENT)
Dept: ADMINISTRATIVE | Facility: HOSPITAL | Age: 59
End: 2019-03-21

## 2019-03-21 DIAGNOSIS — Z12.31 ENCOUNTER FOR SCREENING MAMMOGRAM FOR MALIGNANT NEOPLASM OF BREAST: Primary | ICD-10-CM

## 2019-03-21 NOTE — PROGRESS NOTES
Pre-chart scrubbing done.      Patient canceled her appointment secondary to insurance issues. She will call back to reschedule at a later date.

## 2019-12-13 DIAGNOSIS — Z12.11 COLON CANCER SCREENING: ICD-10-CM

## 2020-01-14 DIAGNOSIS — I83.899 VARICOSE VEINS OF LOWER EXTREMITY WITH EDEMA, UNSPECIFIED LATERALITY: Primary | ICD-10-CM

## 2020-10-02 ENCOUNTER — HOSPITAL ENCOUNTER (OUTPATIENT)
Dept: RADIOLOGY | Facility: HOSPITAL | Age: 60
Discharge: HOME | End: 2020-10-02
Attending: FAMILY MEDICINE
Payer: COMMERCIAL

## 2020-10-02 ENCOUNTER — OFFICE VISIT (OUTPATIENT)
Dept: FAMILY MEDICINE | Facility: CLINIC | Age: 60
End: 2020-10-02
Payer: COMMERCIAL

## 2020-10-02 VITALS
WEIGHT: 187.81 LBS | OXYGEN SATURATION: 98 % | HEIGHT: 67 IN | BODY MASS INDEX: 29.48 KG/M2 | DIASTOLIC BLOOD PRESSURE: 80 MMHG | HEART RATE: 70 BPM | TEMPERATURE: 98 F | SYSTOLIC BLOOD PRESSURE: 114 MMHG

## 2020-10-02 DIAGNOSIS — Z12.31 ENCOUNTER FOR SCREENING MAMMOGRAM FOR BREAST CANCER: ICD-10-CM

## 2020-10-02 DIAGNOSIS — Z00.00 ANNUAL PHYSICAL EXAM: Primary | ICD-10-CM

## 2020-10-02 DIAGNOSIS — Z86.718 HISTORY OF DVT (DEEP VEIN THROMBOSIS): ICD-10-CM

## 2020-10-02 DIAGNOSIS — I83.90 VARICOSE VEINS OF LOWER EXTREMITY, UNSPECIFIED LATERALITY, UNSPECIFIED WHETHER COMPLICATED: ICD-10-CM

## 2020-10-02 PROCEDURE — 77063 BREAST TOMOSYNTHESIS BI: CPT | Mod: 26,,, | Performed by: RADIOLOGY

## 2020-10-02 PROCEDURE — 3008F PR BODY MASS INDEX (BMI) DOCUMENTED: ICD-10-PCS | Mod: CPTII,S$GLB,, | Performed by: FAMILY MEDICINE

## 2020-10-02 PROCEDURE — 3008F BODY MASS INDEX DOCD: CPT | Mod: CPTII,S$GLB,, | Performed by: FAMILY MEDICINE

## 2020-10-02 PROCEDURE — 99999 PR PBB SHADOW E&M-EST. PATIENT-LVL IV: ICD-10-PCS | Mod: PBBFAC,,, | Performed by: FAMILY MEDICINE

## 2020-10-02 PROCEDURE — 77067 SCR MAMMO BI INCL CAD: CPT | Mod: 26,,, | Performed by: RADIOLOGY

## 2020-10-02 PROCEDURE — 77063 MAMMO DIGITAL SCREENING BILAT WITH TOMO: ICD-10-PCS | Mod: 26,,, | Performed by: RADIOLOGY

## 2020-10-02 PROCEDURE — 77067 MAMMO DIGITAL SCREENING BILAT WITH TOMO: ICD-10-PCS | Mod: 26,,, | Performed by: RADIOLOGY

## 2020-10-02 PROCEDURE — 99396 PREV VISIT EST AGE 40-64: CPT | Mod: S$GLB,,, | Performed by: FAMILY MEDICINE

## 2020-10-02 PROCEDURE — 99999 PR PBB SHADOW E&M-EST. PATIENT-LVL IV: CPT | Mod: PBBFAC,,, | Performed by: FAMILY MEDICINE

## 2020-10-02 PROCEDURE — 77067 SCR MAMMO BI INCL CAD: CPT | Mod: TC,PO

## 2020-10-02 PROCEDURE — 99396 PR PREVENTIVE VISIT,EST,40-64: ICD-10-PCS | Mod: S$GLB,,, | Performed by: FAMILY MEDICINE

## 2020-10-02 RX ORDER — ASPIRIN 81 MG/1
81 TABLET ORAL ONCE
COMMUNITY

## 2020-10-02 NOTE — PROGRESS NOTES
Routine Office Visit    Patient Name: Cloie Bodden Reyes    : 1960  MRN: [id number]    Subjective:  Ciro is a 59 y.o. female who presents today for     1. Annual exam / establish care / new to me  2. History of blood clot in her right leg - Patient states last year in [place] she was diagnosed with blood clot in her leg. She was advised it was a deep clot and told to take aspirin twice a day. She has been taking it for a year. She wears compression stocking. She continues to have intermittent episodes of swelling and pain in her right leg. She has notable varicose veins. Leg is not as swollen or red today.     Review of Systems   Constitutional: Negative for chills and fever.   HENT: Negative for congestion.    Eyes: Negative for blurred vision.   Respiratory: Negative for cough.    Cardiovascular: Negative for chest pain.   Gastrointestinal: Negative for abdominal pain, constipation, diarrhea, heartburn, nausea and vomiting.   Genitourinary: Negative for dysuria.   Musculoskeletal: Negative for myalgias.   Skin: Negative for itching and rash.   Neurological: Negative for dizziness and headaches.   Psychiatric/Behavioral: Negative for depression.       Active Problem List  There is no problem list on file for this patient.      Past Surgical History  Past Surgical History:   Procedure Laterality Date    HERNIA REPAIR      VAGINAL DELIVERY         Family History  Family History   Problem Relation Age of Onset    Cancer Mother         kidney    Endometrial cancer Mother     Diabetes Father     Diabetes Sister     Stroke Brother     Clotting disorder Son     Diabetes Brother     No Known Problems Sister     No Known Problems Son     Endometrial cancer Maternal Grandmother        Social History  Social History     Socioeconomic History    Marital status: Single     Spouse name: Not on file    Number of children: Not on file    Years of education: Not on file    Highest education level: Not on  "file   Occupational History    Not on file   Social Needs    Financial resource strain: Not on file    Food insecurity     Worry: Not on file     Inability: Not on file    Transportation needs     Medical: Not on file     Non-medical: Not on file   Tobacco Use    Smoking status: Never Smoker    Smokeless tobacco: Never Used   Substance and Sexual Activity    Alcohol use: No     Alcohol/week: 0.0 standard drinks    Drug use: No    Sexual activity: Yes     Partners: Male   Lifestyle    Physical activity     Days per week: Not on file     Minutes per session: Not on file    Stress: Not on file   Relationships    Social connections     Talks on phone: Not on file     Gets together: Not on file     Attends Christian service: Not on file     Active member of club or organization: Not on file     Attends meetings of clubs or organizations: Not on file     Relationship status: Not on file   Other Topics Concern    Not on file   Social History Narrative    Not on file       Medications and Allergies  Reviewed and updated.   Current Outpatient Medications   Medication Sig    aspirin (ECOTRIN) 81 MG EC tablet Take 81 mg by mouth 2 (two) times a day.    triamcinolone (NASACORT) 55 mcg nasal inhaler 2 sprays by Nasal route once daily.    loratadine (CLARITIN) 10 mg tablet Take 1 tablet (10 mg total) by mouth daily as needed.     No current facility-administered medications for this visit.        Physical Exam  /80 (BP Location: Left arm, Patient Position: Sitting, BP Method: Large (Manual))   Pulse 70   Temp 98.3 °F (36.8 °C) (Oral)   Ht 5' 7" (1.702 m)   Wt 85.2 kg (187 lb 13.3 oz)   SpO2 98%   BMI 29.42 kg/m²   Physical Exam  Constitutional:       Appearance: She is well-developed.   HENT:      Head: Normocephalic and atraumatic.   Eyes:      Conjunctiva/sclera: Conjunctivae normal.      Pupils: Pupils are equal, round, and reactive to light.   Neck:      Musculoskeletal: Normal range of motion and " neck supple.   Cardiovascular:      Rate and Rhythm: Normal rate and regular rhythm.      Heart sounds: Normal heart sounds. No murmur. No friction rub. No gallop.    Pulmonary:      Effort: No respiratory distress.      Breath sounds: Normal breath sounds.   Abdominal:      General: Bowel sounds are normal. There is no distension.      Palpations: Abdomen is soft.      Tenderness: There is no abdominal tenderness.   Musculoskeletal: Normal range of motion.   Lymphadenopathy:      Cervical: No cervical adenopathy.   Skin:     General: Skin is warm.   Neurological:      Mental Status: She is alert and oriented to person, place, and time.           Assessment/Plan:  Cloie Bodden Reyes is a 59 y.o. female who presents today for :    Problem List Items Addressed This Visit     None      Visit Diagnoses     Annual physical exam    -  Primary    Relevant Orders    CBC auto differential    Comprehensive metabolic panel    Lipid Panel    Hemoglobin A1C    TSH    HIV 1/2 Ag/Ab (4th Gen)  Health Maintenance       Date Due Completion Date    HIV Screening 10/29/1974 ---    TETANUS VACCINE 10/29/1977 ---    Shingles Vaccine (1 of 2) 10/29/2009 ---    Colorectal Cancer Screening 12/04/2019 12/4/2018    Mammogram 04/13/2020 4/13/2018    Influenza Vaccine (1) 08/01/2020 11/14/2005    Cervical Cancer Screening 05/11/2021 5/11/2018    Lipid Panel 04/13/2023 4/13/2018        I addressed all major concerns as it related to health maintenance.  All were ordered and scheduled based on the patients wishes.  Any additional health maintenance will be readdressed at the next physical if declined or deferred by the patient.      History of DVT (deep vein thrombosis)        Relevant Medications    aspirin (ECOTRIN) 81 MG EC tablet    Other Relevant Orders    US Lower Extremity Veins Bilateral  F/u with ultrasound   Consider medication management   Continue compression stocking       Varicose veins of lower extremity, unspecified laterality,  unspecified whether complicated        Relevant Orders    Ambulatory referral/consult to Vascular Surgery              Follow up if symptoms worsen or fail to improve.

## 2020-10-14 ENCOUNTER — HOSPITAL ENCOUNTER (OUTPATIENT)
Dept: RADIOLOGY | Facility: HOSPITAL | Age: 60
Discharge: HOME | End: 2020-10-14
Attending: FAMILY MEDICINE
Payer: COMMERCIAL

## 2020-10-14 DIAGNOSIS — R92.8 ABNORMAL MAMMOGRAM OF RIGHT BREAST: ICD-10-CM

## 2020-10-14 DIAGNOSIS — Z86.718 HISTORY OF DVT (DEEP VEIN THROMBOSIS): ICD-10-CM

## 2020-10-14 PROCEDURE — 93970 EXTREMITY STUDY: CPT | Mod: TC

## 2020-10-14 PROCEDURE — 93970 EXTREMITY STUDY: CPT | Mod: 26,,, | Performed by: RADIOLOGY

## 2020-10-14 PROCEDURE — 77061 BREAST TOMOSYNTHESIS UNI: CPT | Mod: 26,RT,, | Performed by: RADIOLOGY

## 2020-10-14 PROCEDURE — 76642 ULTRASOUND BREAST LIMITED: CPT | Mod: 26,RT,, | Performed by: RADIOLOGY

## 2020-10-14 PROCEDURE — 76642 ULTRASOUND BREAST LIMITED: CPT | Mod: TC,RT

## 2020-10-14 PROCEDURE — 77061 MAMMO DIGITAL DIAGNOSTIC RIGHT WITH TOMO: ICD-10-PCS | Mod: 26,RT,, | Performed by: RADIOLOGY

## 2020-10-14 PROCEDURE — 77061 BREAST TOMOSYNTHESIS UNI: CPT | Mod: TC,RT

## 2020-10-14 PROCEDURE — 77065 DX MAMMO INCL CAD UNI: CPT | Mod: 26,RT,, | Performed by: RADIOLOGY

## 2020-10-14 PROCEDURE — 93970 US LOWER EXTREMITY VEINS BILATERAL: ICD-10-PCS | Mod: 26,,, | Performed by: RADIOLOGY

## 2020-10-14 PROCEDURE — 77065 MAMMO DIGITAL DIAGNOSTIC RIGHT WITH TOMO: ICD-10-PCS | Mod: 26,RT,, | Performed by: RADIOLOGY

## 2020-10-14 PROCEDURE — 76642 US BREAST RIGHT LIMITED: ICD-10-PCS | Mod: 26,RT,, | Performed by: RADIOLOGY

## 2020-10-14 PROCEDURE — 77065 DX MAMMO INCL CAD UNI: CPT | Mod: TC,RT

## 2020-10-26 ENCOUNTER — TELEPHONE (OUTPATIENT)
Dept: FAMILY MEDICINE | Facility: CLINIC | Age: 60
End: 2020-10-26

## 2020-10-26 NOTE — TELEPHONE ENCOUNTER
Spoke to patient she is having a procedure done on her breast and wanted to know if she could stop her ASA and per Dr. Stiles I explained that she is able to stop her Asprin until after procedure. Patient stated good understanding.

## 2020-10-28 ENCOUNTER — HOSPITAL ENCOUNTER (OUTPATIENT)
Dept: RADIOLOGY | Facility: HOSPITAL | Age: 60
Discharge: HOME | End: 2020-10-28
Attending: FAMILY MEDICINE
Payer: COMMERCIAL

## 2020-10-28 DIAGNOSIS — R92.8 ABNORMAL MAMMOGRAM OF RIGHT BREAST: Primary | ICD-10-CM

## 2020-10-28 PROCEDURE — 88305 TISSUE EXAM BY PATHOLOGIST: CPT | Mod: 26,,, | Performed by: PATHOLOGY

## 2020-10-28 PROCEDURE — 19081 MAMMO BREAST STEREOTACTIC BREAST BIOPSY RIGHT: ICD-10-PCS | Mod: RT,,, | Performed by: RADIOLOGY

## 2020-10-28 PROCEDURE — 88305 TISSUE EXAM BY PATHOLOGIST: ICD-10-PCS | Mod: 26,,, | Performed by: PATHOLOGY

## 2020-10-28 PROCEDURE — 19081 BX BREAST 1ST LESION STRTCTC: CPT | Mod: RT

## 2020-10-28 PROCEDURE — 25000003 PHARM REV CODE 250: Performed by: RADIOLOGY

## 2020-10-28 PROCEDURE — 88305 TISSUE EXAM BY PATHOLOGIST: CPT | Performed by: PATHOLOGY

## 2020-10-28 PROCEDURE — 19081 BX BREAST 1ST LESION STRTCTC: CPT | Mod: RT,,, | Performed by: RADIOLOGY

## 2020-10-28 RX ORDER — LIDOCAINE HYDROCHLORIDE 20 MG/ML
20 INJECTION, SOLUTION INFILTRATION; PERINEURAL ONCE
Status: COMPLETED | OUTPATIENT
Start: 2020-10-28 | End: 2020-10-28

## 2020-10-28 RX ORDER — LIDOCAINE HYDROCHLORIDE AND EPINEPHRINE 10; 10 MG/ML; UG/ML
20 INJECTION, SOLUTION INFILTRATION; PERINEURAL ONCE
Status: COMPLETED | OUTPATIENT
Start: 2020-10-28 | End: 2020-10-28

## 2020-10-28 RX ADMIN — LIDOCAINE HYDROCHLORIDE,EPINEPHRINE BITARTRATE 13 ML: 10; .01 INJECTION, SOLUTION INFILTRATION; PERINEURAL at 10:10

## 2020-10-28 RX ADMIN — LIDOCAINE HYDROCHLORIDE 5 ML: 20 INJECTION, SOLUTION INFILTRATION; PERINEURAL at 10:10

## 2020-10-30 LAB
FINAL PATHOLOGIC DIAGNOSIS: NORMAL
GROSS: NORMAL

## 2020-11-02 ENCOUNTER — TELEPHONE (OUTPATIENT)
Dept: FAMILY MEDICINE | Facility: CLINIC | Age: 60
End: 2020-11-02

## 2020-11-02 NOTE — TELEPHONE ENCOUNTER
Bandages are usually kept on 7-10 days  Patient can be placed in 2pm new slot on Thursday afternoon

## 2020-11-02 NOTE — TELEPHONE ENCOUNTER
----- Message from Areli Alvares sent at 11/2/2020 12:10 PM CST -----  Type: Patient Call Back    Who called: Self    What is the request in detail: patient would like to be schedule to have bandage removed. Please call    Can the clinic reply by [facility]? no    Would the patient rather a call back or a response via [facility]? Call    Best call back number:[phone]

## 2020-11-02 NOTE — TELEPHONE ENCOUNTER
Called patient and she states that she had a biopsy done on 10/28/20 and they told her that she needs to see her pcp to get the bandages removed and not to remove them herself, however they did not tell her how long to keep bandages on, please advise.

## 2020-11-05 ENCOUNTER — OFFICE VISIT (OUTPATIENT)
Dept: FAMILY MEDICINE | Facility: CLINIC | Age: 60
End: 2020-11-05
Payer: COMMERCIAL

## 2020-11-05 VITALS
HEIGHT: 67 IN | BODY MASS INDEX: 29.76 KG/M2 | DIASTOLIC BLOOD PRESSURE: 80 MMHG | WEIGHT: 189.63 LBS | SYSTOLIC BLOOD PRESSURE: 112 MMHG | OXYGEN SATURATION: 97 % | TEMPERATURE: 98 F | HEART RATE: 74 BPM

## 2020-11-05 DIAGNOSIS — R92.8 ABNORMAL MAMMOGRAM: Primary | ICD-10-CM

## 2020-11-05 DIAGNOSIS — Z98.890 H/O BREAST BIOPSY: ICD-10-CM

## 2020-11-05 PROCEDURE — 3008F PR BODY MASS INDEX (BMI) DOCUMENTED: ICD-10-PCS | Mod: CPTII,S$GLB,, | Performed by: FAMILY MEDICINE

## 2020-11-05 PROCEDURE — 99213 OFFICE O/P EST LOW 20 MIN: CPT | Mod: S$GLB,,, | Performed by: FAMILY MEDICINE

## 2020-11-05 PROCEDURE — 99999 PR PBB SHADOW E&M-EST. PATIENT-LVL III: CPT | Mod: PBBFAC,,, | Performed by: FAMILY MEDICINE

## 2020-11-05 PROCEDURE — 3008F BODY MASS INDEX DOCD: CPT | Mod: CPTII,S$GLB,, | Performed by: FAMILY MEDICINE

## 2020-11-05 PROCEDURE — 99213 PR OFFICE/OUTPT VISIT, EST, LEVL III, 20-29 MIN: ICD-10-PCS | Mod: S$GLB,,, | Performed by: FAMILY MEDICINE

## 2020-11-05 PROCEDURE — 99999 PR PBB SHADOW E&M-EST. PATIENT-LVL III: ICD-10-PCS | Mod: PBBFAC,,, | Performed by: FAMILY MEDICINE

## 2020-11-05 RX ORDER — ASPIRIN 81 MG/1
81 TABLET ORAL
COMMUNITY
End: 2021-04-05 | Stop reason: SDUPTHER

## 2020-11-05 RX ORDER — ACETAMINOPHEN 500 MG
500 TABLET ORAL DAILY PRN
COMMUNITY
Start: 2019-11-18 | End: 2024-03-12

## 2020-11-05 NOTE — PROGRESS NOTES
Routine Office Visit    Patient Name: Cloie Bodden Reyes    : 1960  MRN: [id number]    Subjective:  Ciro is a 60 y.o. female who presents today for     1. Left breast biopsy bandage removal - Patient states she had biopsy by radiology and was not advised on what to do after the biopsy. She did not know when to remove the bandage or what should be done afterwards. She states she has some sharp pain after the biopsy. The biopsy results is also a concern to her. Currently she has no fever or chills. She reports some breast tenderness.     Review of Systems   Constitutional: Negative for chills and fever.   HENT: Negative for congestion.    Eyes: Negative for blurred vision.   Respiratory: Negative for cough.    Cardiovascular: Negative for chest pain.   Gastrointestinal: Negative for abdominal pain, constipation, diarrhea, heartburn, nausea and vomiting.   Genitourinary: Negative for dysuria.   Musculoskeletal: Negative for myalgias.   Skin: Negative for itching and rash.   Neurological: Negative for dizziness and headaches.   Psychiatric/Behavioral: Negative for depression.       Active Problem List  There is no problem list on file for this patient.      Past Surgical History  Past Surgical History:   Procedure Laterality Date    BIOPSY  10/28/2020    CHOLECYSTECTOMY      HERNIA REPAIR      VAGINAL DELIVERY         Family History  Family History   Problem Relation Age of Onset    Cancer Mother         kidney    Endometrial cancer Mother     Diabetes Father     Diabetes Sister     Stroke Brother     Clotting disorder Son     Diabetes Brother     No Known Problems Sister     No Known Problems Son     Endometrial cancer Maternal Grandmother        Social History  Social History     Socioeconomic History    Marital status:      Spouse name: Not on file    Number of children: Not on file    Years of education: Not on file    Highest education level: Not on file   Occupational History     "Not on file   Social Needs    Financial resource strain: Not on file    Food insecurity     Worry: Not on file     Inability: Not on file    Transportation needs     Medical: Not on file     Non-medical: Not on file   Tobacco Use    Smoking status: Never Smoker    Smokeless tobacco: Never Used   Substance and Sexual Activity    Alcohol use: No     Alcohol/week: 0.0 standard drinks    Drug use: No    Sexual activity: Yes     Partners: Male   Lifestyle    Physical activity     Days per week: Not on file     Minutes per session: Not on file    Stress: Not on file   Relationships    Social connections     Talks on phone: Not on file     Gets together: Not on file     Attends Sabianist service: Not on file     Active member of club or organization: Not on file     Attends meetings of clubs or organizations: Not on file     Relationship status: Not on file   Other Topics Concern    Not on file   Social History Narrative    Not on file       Medications and Allergies  Reviewed and updated.   Current Outpatient Medications   Medication Sig    acetaminophen (TYLENOL EXTRA STRENGTH) 500 MG tablet Take 2 tablets every 8 hours by oral route as needed for 14 days.    aspirin (ECOTRIN) 81 MG EC tablet Take 81 mg by mouth 2 (two) times a day.    aspirin (ECOTRIN) 81 MG EC tablet Take 81 mg by mouth.    loratadine (CLARITIN) 10 mg tablet Take 1 tablet (10 mg total) by mouth daily as needed.    triamcinolone (NASACORT) 55 mcg nasal inhaler 2 sprays by Nasal route once daily.     No current facility-administered medications for this visit.        Physical Exam  /80 (BP Location: Left arm, Patient Position: Sitting, BP Method: Large (Manual))   Pulse 74   Temp 97.8 °F (36.6 °C) (Oral)   Ht 5' 7" (1.702 m)   Wt 86 kg (189 lb 9.5 oz)   SpO2 97%   BMI 29.69 kg/m²   Physical Exam  Constitutional:       Appearance: She is well-developed.   HENT:      Head: Normocephalic and atraumatic.   Eyes:      " Conjunctiva/sclera: Conjunctivae normal.      Pupils: Pupils are equal, round, and reactive to light.   Neck:      Musculoskeletal: Normal range of motion and neck supple.   Cardiovascular:      Rate and Rhythm: Normal rate and regular rhythm.      Heart sounds: Normal heart sounds. No murmur. No friction rub. No gallop.    Pulmonary:      Effort: No respiratory distress.      Breath sounds: Normal breath sounds.   Chest:       Abdominal:      General: Bowel sounds are normal. There is no distension.      Palpations: Abdomen is soft.      Tenderness: There is no abdominal tenderness.   Musculoskeletal: Normal range of motion.   Lymphadenopathy:      Cervical: No cervical adenopathy.   Skin:     General: Skin is warm.   Neurological:      Mental Status: She is alert and oriented to person, place, and time.           Assessment/Plan:  Cloie Bodden Reyes is a 60 y.o. female who presents today for :    Problem List Items Addressed This Visit     None      Visit Diagnoses     Abnormal mammogram    -  Primary    H/O breast biopsy      Removed bandage   Left steristrip in place  Advise for Patient to shower normally and steristrip should come off on its own   Avoid pulling or removing steristrip   Clean with saline after streristrips come off   Discussed results of biopsy with patient - fibrous tissue noted. No malignancy  Reassurance   Skin precautions discussed with patient             No follow-ups on file.

## 2020-11-11 ENCOUNTER — TELEPHONE (OUTPATIENT)
Dept: WOUND CARE | Facility: HOSPITAL | Age: 60
End: 2020-11-11

## 2020-11-12 NOTE — TELEPHONE ENCOUNTER
----- Message from Stefani De Anda LPN sent at 11/11/2020  3:39 PM CST -----  Contact: Self [phone]    ----- Message -----  From: Funmi Espinoza  Sent: 11/11/2020   3:34 PM CST  To: [facility] Staff    Type: Patient Call Back    Who called:Self     What is the request in detail:Pt is calling in regards to upcoming appt with Vascular surgery, pt states that she is confused on why she has another appt, also pt states that she was told by vascular surgery that Dr. Stiles made the appt    Can the clinic reply by [facility]? Call back    Would the patient rather a call back or a response via [facility]? Call back    Best call back number: [phone]      Thank You

## 2020-11-16 ENCOUNTER — PATIENT OUTREACH (OUTPATIENT)
Dept: ADMINISTRATIVE | Facility: OTHER | Age: 60
End: 2020-11-16

## 2020-11-16 ENCOUNTER — OFFICE VISIT (OUTPATIENT)
Dept: VASCULAR SURGERY | Facility: CLINIC | Age: 60
End: 2020-11-16
Payer: COMMERCIAL

## 2020-11-16 VITALS
BODY MASS INDEX: 30.17 KG/M2 | WEIGHT: 192.25 LBS | DIASTOLIC BLOOD PRESSURE: 68 MMHG | SYSTOLIC BLOOD PRESSURE: 110 MMHG | HEIGHT: 67 IN

## 2020-11-16 DIAGNOSIS — I87.001 POST-THROMBOTIC SYNDROME OF RIGHT LOWER EXTREMITY: ICD-10-CM

## 2020-11-16 DIAGNOSIS — I83.90 VARICOSE VEINS OF LOWER EXTREMITY, UNSPECIFIED LATERALITY, UNSPECIFIED WHETHER COMPLICATED: Primary | ICD-10-CM

## 2020-11-16 PROCEDURE — 1126F AMNT PAIN NOTED NONE PRSNT: CPT | Mod: S$GLB,,, | Performed by: SURGERY

## 2020-11-16 PROCEDURE — 99244 PR OFFICE CONSULTATION,LEVEL IV: ICD-10-PCS | Mod: S$GLB,,, | Performed by: SURGERY

## 2020-11-16 PROCEDURE — 3008F BODY MASS INDEX DOCD: CPT | Mod: CPTII,S$GLB,, | Performed by: SURGERY

## 2020-11-16 PROCEDURE — 1126F PR PAIN SEVERITY QUANTIFIED, NO PAIN PRESENT: ICD-10-PCS | Mod: S$GLB,,, | Performed by: SURGERY

## 2020-11-16 PROCEDURE — 99999 PR PBB SHADOW E&M-EST. PATIENT-LVL III: ICD-10-PCS | Mod: PBBFAC,,, | Performed by: SURGERY

## 2020-11-16 PROCEDURE — 3008F PR BODY MASS INDEX (BMI) DOCUMENTED: ICD-10-PCS | Mod: CPTII,S$GLB,, | Performed by: SURGERY

## 2020-11-16 PROCEDURE — 99244 OFF/OP CNSLTJ NEW/EST MOD 40: CPT | Mod: S$GLB,,, | Performed by: SURGERY

## 2020-11-16 PROCEDURE — 99999 PR PBB SHADOW E&M-EST. PATIENT-LVL III: CPT | Mod: PBBFAC,,, | Performed by: SURGERY

## 2020-11-16 NOTE — PROGRESS NOTES
Kyler Daly MD, RPVI                                 [facility]                                                        11/16/2020    HPI:  Cloie Bodden Reyes is a 60 y.o. female with There is no problem list on file for this patient.   being managed by PCP and specialists who is here today for evaluation of BLE edema.  Patient states location is BLE occurring for a few months.  Associated signs and symptoms include pain.  Quality is heavy and severity is 5/10.  Symptoms began a few mo ago.  Alleviating factors include elevation.  Worsening factors include dependency.  Patient is not wearing compression stockings daily.  No FH of venous disease.  Symptoms do not limit patient's functional status and daily activities.  No DVT history.  No venous interventions.  No low sodium diet.  No excessive water intake.    Migraine with aura: no  PFO/ASD/right to left shunt: no  Pregnant: no  Breastfeeding: no    no MI  no Stroke  Tobacco use: no    Past Medical History:   Diagnosis Date    Allergy     Fibrocystic breast     History of cholecystectomy      Past Surgical History:   Procedure Laterality Date    BIOPSY  10/28/2020    CHOLECYSTECTOMY      HERNIA REPAIR      VAGINAL DELIVERY       Family History   Problem Relation Age of Onset    Cancer Mother         kidney    Endometrial cancer Mother     Diabetes Father     Diabetes Sister     Stroke Brother     Clotting disorder Son     Diabetes Brother     No Known Problems Sister     No Known Problems Son     Endometrial cancer Maternal Grandmother      Social History     Socioeconomic History    Marital status:      Spouse name: Not on file    Number of children: Not on file    Years of education: Not on file    Highest education level: Not on file   Occupational History    Not on file   Social Needs    Financial resource strain: Not on file    Food insecurity     Worry: Not on file     Inability: Not on file     Transportation needs     Medical: Not on file     Non-medical: Not on file   Tobacco Use    Smoking status: Never Smoker    Smokeless tobacco: Never Used   Substance and Sexual Activity    Alcohol use: No     Alcohol/week: 0.0 standard drinks    Drug use: No    Sexual activity: Yes     Partners: Male   Lifestyle    Physical activity     Days per week: Not on file     Minutes per session: Not on file    Stress: Not on file   Relationships    Social connections     Talks on phone: Not on file     Gets together: Not on file     Attends Scientologist service: Not on file     Active member of club or organization: Not on file     Attends meetings of clubs or organizations: Not on file     Relationship status: Not on file   Other Topics Concern    Not on file   Social History Narrative    Not on file       Current Outpatient Medications:     aspirin (ECOTRIN) 81 MG EC tablet, Take 81 mg by mouth 2 (two) times a day., Disp: , Rfl:     triamcinolone (NASACORT) 55 mcg nasal inhaler, 2 sprays by Nasal route once daily., Disp: , Rfl:     acetaminophen (TYLENOL EXTRA STRENGTH) 500 MG tablet, Take 2 tablets every 8 hours by oral route as needed for 14 days., Disp: , Rfl:     aspirin (ECOTRIN) 81 MG EC tablet, Take 81 mg by mouth., Disp: , Rfl:     loratadine (CLARITIN) 10 mg tablet, Take 1 tablet (10 mg total) by mouth daily as needed., Disp: 30 tablet, Rfl: 0    REVIEW OF SYSTEMS:  General: No fevers or chills; ENT: No sore throat; Allergy and Immunology: no persistent infections; Hematological and Lymphatic: No history of bleeding or easy bruising; Endocrine: negative; Respiratory: no cough, shortness of breath, or wheezing; Cardiovascular: no chest pain or dyspnea on exertion; Gastrointestinal: no abdominal pain/back, change in bowel habits, or bloody stools; Genito-Urinary: no dysuria, trouble voiding, or hematuria; Musculoskeletal: edema; Neurological: no TIA or stroke symptoms; Psychiatric: no nervousness,  anxiety or depression.    PHYSICAL EXAM:                General appearance:  Alert, well-appearing, and in no distress.  Oriented to person, place, and time                    Neurological: Normal speech, no focal findings noted; CN II - XII grossly intact. RLE with sensation to light touch, LLE with sensation to light touch.            Musculoskeletal: Digits/nail without cyanosis/clubbing.  Strength 5/5 BLE.                    Neck: Supple, no significant adenopathy                  Chest:  No wheezes, symmetric air entry. No use of accessory muscles               Cardiac: Normal rate and regular rhythm            Abdomen: Soft, nontender, nondistended      Extremities:   2+ R DP pulse, 2+ L DP pulse      1+ RLE edema, 1+ LLE edema    Skin:  RLE no ulcer; LLE no ulcer      RLE + spider veins, LLE + spider veins      RLE no varicose veins, LLE no varicose veins    CEAP 3/3    LAB RESULTS:  No results found for: CBC  No results found for: LABPROT, INR  Lab Results   Component Value Date     10/02/2020    K 4.8 10/02/2020     10/02/2020    CO2 26 10/02/2020    GLU 91 10/02/2020    BUN 14 10/02/2020    CREATININE 0.7 10/02/2020    CALCIUM 9.5 10/02/2020    ANIONGAP 9 10/02/2020    EGFRNONAA >60.0 10/02/2020     Lab Results   Component Value Date    WBC 4.61 10/02/2020    RBC 5.07 10/02/2020    HGB 13.3 10/02/2020    HCT 44.1 10/02/2020    MCV 87 10/02/2020    MCH 26.2 (L) 10/02/2020    MCHC 30.2 (L) 10/02/2020    RDW 12.5 10/02/2020     10/02/2020    MPV 10.9 10/02/2020    GRAN 2.7 10/02/2020    GRAN 57.9 10/02/2020    LYMPH 1.5 10/02/2020    LYMPH 33.2 10/02/2020    MONO 0.3 10/02/2020    MONO 6.9 10/02/2020    EOS 0.0 10/02/2020    BASO 0.04 10/02/2020    EOSINOPHIL 0.9 10/02/2020    BASOPHIL 0.9 10/02/2020    DIFFMETHOD Automated 10/02/2020     .  Lab Results   Component Value Date    HGBA1C 5.9 (H) 10/02/2020       IMAGING:  All pertinent imaging has been reviewed and interpreted  independently.    Venous US 2020 reviewed.    IMP/PLAN:  60 y.o. female with There is no problem list on file for this patient.   being managed by PCP and specialists who is here today for evaluation of BLE edema and pain.    -recommend compression with Rx stockings, elevation, dietary changes associated with water and sodium intake discussed at length with patient  -Exercise   -RTC 3 months for further evaluation    I spent 11 minutes evaluating this patient and greater than 50% of the time was spent counseling, coordinator care and discussing the plan of care.  All questions were answered and patient stated understanding with agreement with the above treatment plan.    Kyler Daly MD [facility]  Vascular and Endovascular Surgery

## 2020-11-16 NOTE — LETTER
November 16, 2020      Vivi Stiles MD  [address] [facility]  [place] [address]           [facility]  [address]  [place] [address]  Phone: [phone]  Fax: [phone]          Patient: Cloie Bodden Reyes   MR Number: [id number]   YOB: 1960   Date of Visit: 11/16/2020       Dear Dr. Vivi Stiles:    Thank you for referring Cloie Reyes to me for evaluation. Attached you will find relevant portions of my assessment and plan of care.    If you have questions, please do not hesitate to call me. I look forward to following Cloie Reyes along with you.    Sincerely,    Kyler Daly MD    Enclosure  CC:  No Recipients    If you would like to receive this communication electronically, please contact [email or web address] or [phone] to request more information on [employer] access.    For providers and/or their staff who would like to refer a patient to [facility], please contact us through our one-stop-shop provider referral line, [facility], at [phone].    If you feel you have received this communication in error or would no longer like to receive these types of communications, please e-mail [email or web address]

## 2020-11-16 NOTE — PATIENT INSTRUCTIONS
Putting on Compression Stockings     Turn the stocking inside-out, then fit it over your toes and heel.          Roll the stocking up your leg.            Once stockings are on, make sure the top of the stocking is about two fingers width below the crease of the knee (or the groin if you wear thigh-high stockings).          Use equipment, such as a stocking ramirez, or wear rubber gloves to make it easier to put on compression stockings.         Elastic compression stockings are prescribed to treat many vein problems. Wearing them may be the most important thing you do to manage your symptoms. The stockings fit tightly around your ankle, gradually reducing in pressure as they go up your legs. This helps keep blood flowing to your heart. As a result, swelling is reduced. Your healthcare provider will prescribe stockings at a safe pressure for you. He or she will also tell you how often to wear and remove the stockings. Follow these instructions closely. Also, do not buy or wear compression stockings without first seeing your healthcare provider.  Tips for wear and care  To wear stockings safely and to get the most benefit:  · Wear the length prescribed by your healthcare provider.  · Pull them to the designated height and no farther. Dont let them bunch at the top. This can restrict blood flow and increase swelling.  · Wear the stockings for the amount of time your healthcare provider recommends. Replace them when they start to feel loose. This will likely be every 3 to 6 months.  · Remove them as your healthcare provider directs. When removed, wash your legs. Then check your legs and feet for sores. Call your healthcare provider if you find a sore. Dont put the stockings back on unless your healthcare provider directs.   · Wash the stockings as instructed. They may need to be hand-washed.  Date Last Reviewed: 5/1/2016  © 1362-3436 [employer]. [address] [place] [address], [place] [address]. All rights  reserved. This information is not intended as a substitute for professional medical care. Always follow your healthcare professional's instructions.        Understanding Chronic Venous Insufficiency  Problems with the veins in the legs may lead to chronic venous insufficiency (CVI). CVI means that there is a long-term problem with the veins not being able to pump blood back to your heart. When this happens, blood stays in the legs and causes swelling and aching.   Two problems that may lead to chronic venous insufficiency are:  · Damaged valves. Valves keep blood flowing from the legs through the blood vessels and back to the heart. When the valves are damaged, blood does not flow as well.   · Deep vein thrombosis (DVT). Blood clots may form in the deep veins of the legs. This may cause pain, redness, and swelling in the legs. It may also block the flow of blood back to the heart. Seek immediate medical care if you have these symptoms.  · A blood clot in the leg can also break off and travel to the lungs. This is called pulmonary embolism (PE). In the lungs, the clot can cut off the flow of blood. This may cause chest pain, trouble breathing, sweating, a fast heartbeat, coughing (may cough up blood), and fainting. It is a medical emergency and may cause death. Call 911 if you have these symptoms.  · Healthcare providers call the two conditions, DVT and PE, venous thromboembolism (VTE).  CVI cant be cured, but you can control leg swelling to reduce the likelihood of ulcers (sores).  Recognizing the symptoms  Be aware of the following:  · If you stand or sit with your feet down for long periods, your legs may ache or feel heavy.  · Swollen ankles are possibly the most common symptom of CVI.  · As swelling increases, the skin over your ankles may show red spots or a brownish tinge. The skin may feel leathery or scaly, and may start to itch.  · If swelling is not controlled, an ulcer (open wound) may form.  What you  can do  Reduce your risk of developing ulcers by doing the following:  · Increase blood flow back to your heart by elevating your legs, exercising daily, and wearing elastic stockings.  · Boost blood flow in your legs by losing excess weight.  · If you must stand or sit in one place for a period of time, keep your blood moving by wiggling your toes, shifting your body position, and rising up on the balls of your feet.    Date Last Reviewed: 5/1/2016 © 2000-2017 [employer]. [address] [place] [address], [place] [address]. All rights reserved. This information is not intended as a substitute for professional medical care. Always follow your healthcare professional's instructions.        Tips for Using Less Salt    Most people with heart problems need to eat less salt (sodium). Reducing the amount of salt you eat may help control your blood pressure. The higher your blood pressure, the greater your risk for heart disease, stroke, blindness, and kidney problems.  At the store  · Make low-salt choices by reading labels carefully. Look for the total amount of sodium per serving.  · Use more fresh food. Buy more fruits and vegetables. Select lean meats, fish, and poultry.  · Use fewer frozen, canned, and packaged foods which often contain a lot of sodium.  · Use plain frozen vegetables without sauces or toppings. These products are often low- or no-sodium.  · Opt for reduced-sodium or no-salt-added versions of canned vegetables and soups.  In the kitchen  · Don't add salt to food when you're cooking. Season with flavorings such as onion, garlic, pepper, salt-free herbal blends, and lemon or lime juice.  · Use a cookbook containing low-salt recipes. It can give you ideas for tasty meals that are healthy for your heart.  · Sprinkle salt-free herbal blends on vegetables and meat.  · Drain and rinse canned foods, such as canned beans and vegetables, before cooking or eating.  Eating out  · Tell the  you're on a  low-salt diet. Ask questions about the menu.  · Order fish, chicken, and meat broiled, baked, poached, or grilled without salt, butter, or breading.  · Use lemon, pepper, and salt-free herb mixes to add flavor.  · Choose plain steamed rice, boiled noodles, and baked or boiled potatoes. Top potatoes with chives and a little sour cream.     Beware! Salt goes by many other names. Limit foods with these words listed as ingredients: salt, sodium, soy sauce, baking soda, baking powder, MSG, monosodium, Na (the chemical symbol for sodium). Some antacids are also high in salt.   Date Last Reviewed: 6/19/2015 © 2000-2017 [employer]. [address] [place] [address]. All rights reserved. This information is not intended as a substitute for professional medical care. Always follow your healthcare professional's instructions.        Low-Salt Diet  This diet removes foods that are high in salt. It also limits the amount of salt you use when cooking. It is most often used for people with high blood pressure, edema (fluid retention), and kidney, liver, or heart disease.  Table salt contains the mineral sodium. Your body needs sodium to work normally. But too much sodium can make your health problems worse. Your healthcare provider is recommending a low-salt (also called low-sodium) diet for you. Your total daily allowance of salt is 1,500 to 2,300 milligrams (mg). It is less than 1 teaspoon of table salt. This means you can have only about 500 to 700 mg of sodium at each meal. People with certain health problems should limit salt intake to the lower end of the recommended range.    When you cook, dont add much salt. If you can cook without using salt, even better. Dont add salt to your food at the table.  When shopping, read food labels. Salt is often called sodium on the label. Choose foods that are salt-free, low salt, or very low salt. Note that foods with reduced salt may not lower your salt intake  enough.    Beans, potatoes, and pasta  Ok: Dry beans, split peas, lentils, potatoes, rice, macaroni, pasta, spaghetti without added salt  Avoid: Potato chips, tortilla chips, and similar products  Breads and cereals  Ok: Low-sodium breads, rolls, cereals, and cakes; low-salt crackers, matzo crackers  Avoid: Salted crackers, pretzels, popcorn, Yi toast, pancakes, muffins  Dairy  Ok: Milk, chocolate milk, hot chocolate mix, low-salt cheeses, and yogurt  Avoid: Processed cheese and cheese spreads; Roquefort, Camembert, and cottage cheese; buttermilk, instant breakfast drink  Desserts  Ok: Ice cream, frozen yogurt, juice bars, gelatin, cookies and pies, sugar, honey, jelly, hard candy  Avoid: Most pies, cakes and cookies prepared or processed with salt; instant pudding  Drinks  Ok: Tea, coffee, fizzy (carbonated) drinks, juices  Avoid: Flavored coffees, electrolyte replacement drinks, sports drinks  Meats  Ok: All fresh meat, fish, poultry, low-salt tuna, eggs, egg substitute  Avoid: Smoked, pickled, brine-cured, or salted meats and fish. This includes eli, chipped beef, corned beef, hot dogs, deli meats, ham, kosher meats, salt pork, sausage, canned tuna, salted codfish, smoked salmon, herring, sardines, or anchovies.  Seasonings and spices  Ok: Most seasonings are okay. Good substitutes for salt include: fresh herb blends, hot sauce, lemon, garlic, velazquez, vinegar, dry mustard, parsley, cilantro, horseradish, tomato paste, regular margarine, mayonnaise, unsalted butter, cream cheese, vegetable oil, cream, low-salt salad dressing and gravy.  Avoid: Regular ketchup, relishes, pickles, soy sauce, teriyaki sauce, Worcestershire sauce, BBQ sauce, tartar sauce, meat tenderizer, chili sauce, regular gravy, regular salad dressing, salted butter  Soups  Ok: Low-salt soups and broths made with allowed foods  Avoid: Bouillon cubes, soups with smoked or salted meats, regular soup and broth  Vegetables  Ok: Most vegetables  are okay; also low-salt tomato and vegetable juices  Avoid: Sauerkraut and other brine-soaked vegetables; pickles and other pickled vegetables; tomato juice, olives  Date Last Reviewed: 8/1/2016 © 2000-2017 [employer]. [address] [place] [address]. All rights reserved. This information is not intended as a substitute for professional medical care. Always follow your healthcare professional's instructions.        Low-Salt Choices  Eating salt (sodium) can make your body retain too much water. Excess water makes your heart work harder. Canned, packaged, and frozen foods are easy to prepare, but they are often high in sodium. Here are some ideas for low-salt foods you can easily prepare yourself.    For breakfast  · Fruit or 100% fruit juice  · Whole-wheat bread or an English muffin. Compare sodium content on labels.  · Low-fat milk or yogurt  · Unsalted eggs  · Shredded wheat  · Corn tortillas  · Unsalted steamed rice  · Regular (not instant) hot cereal, made without salt  Stay away from:  · Sausage, eli, and ham  · Flour tortillas  · Packaged muffins, pancakes, and biscuits  · Instant hot cereals  · Cottage cheese  For lunch and dinner  · Fresh fish, chicken, turkey, or meat--baked, broiled, or roasted without salt  · Dry beans, cooked without salt  · Tofu, stir-fried without salt  · Unsalted fresh fruit and vegetables, or frozen or canned fruit and vegetables with no added salt  Stay away from:  · Lunch or deli meat that is cured or smoked  · Cheese  · Tomato juice and catsup  · Canned vegetables, soups, and fish not labeled as no-salt-added or reduced sodium  · Packaged gravies and sauces  · Olives, pickles, and relish  · Bottled salad dressings  For snacks and desserts  · Yogurt  · Unsalted, air popped popcorn  · Unsalted nuts or seeds  Stay away from:  · Pies and cakes  · Packaged dessert mixes  · Pizza  · Canned and packaged puddings  · Pretzels, chips, crackers, and nuts--unless  the label says unsalted  Date Last Reviewed: 6/17/2015  © 9894-9846 [employer]. [address] [place] [address], [place] [address]. All rights reserved. This information is not intended as a substitute for professional medical care. Always follow your healthcare professional's instructions.

## 2020-12-16 DIAGNOSIS — Z12.11 COLON CANCER SCREENING: ICD-10-CM

## 2021-02-12 ENCOUNTER — HOSPITAL ENCOUNTER (OUTPATIENT)
Dept: CARDIOLOGY | Facility: HOSPITAL | Age: 61
Discharge: HOME | End: 2021-02-12
Attending: SURGERY
Payer: COMMERCIAL

## 2021-02-12 PROCEDURE — 93970 EXTREMITY STUDY: CPT | Mod: 26,,, | Performed by: SURGERY

## 2021-02-12 PROCEDURE — 93970 EXTREMITY STUDY: CPT | Mod: TC

## 2021-02-12 PROCEDURE — 93970 CV US LOWER VENOUS INSUFFICIENCY BILATERAL (CUPID ONLY): ICD-10-PCS | Mod: 26,,, | Performed by: SURGERY

## 2021-02-16 LAB
LEFT GREAT SAPHENOUS DISTAL THIGH DIA: 0.2 CM
LEFT GREAT SAPHENOUS JUNCTION DIA: 0.7 CM
LEFT GREAT SAPHENOUS KNEE DIA: 0.2 CM
LEFT GREAT SAPHENOUS MIDDLE THIGH DIA: 0.2 CM
LEFT GREAT SAPHENOUS PROXIMAL CALF DIA: 0.2 CM
LEFT SMALL SAPHENOUS KNEE DIA: 0.2 CM
LEFT SMALL SAPHENOUS SPJ DIA: 0.1 CM
RIGHT GREAT SAPHENOUS DISTAL THIGH DIA: 0.3 CM
RIGHT GREAT SAPHENOUS DISTAL THIGH REFLUX: 5411 MS
RIGHT GREAT SAPHENOUS JUNCTION DIA: 0.9 CM
RIGHT GREAT SAPHENOUS KNEE DIA: 0.2 CM
RIGHT GREAT SAPHENOUS MIDDLE THIGH DIA: 0.4 CM
RIGHT GREAT SAPHENOUS PROXIMAL CALF DIA: 0.2 CM
RIGHT GREAT SAPHENOUS PROXIMAL CALF REFLUX: 3739 MS
RIGHT SMALL SAPHENOUS KNEE DIA: 0.2 CM
RIGHT SMALL SAPHENOUS KNEE REFLUX: 4498 MS
RIGHT SMALL SAPHENOUS SPJ DIA: 0.2 CM
RIGHT SMALL SAPHENOUS SPJ REFLUX: 5297 MS

## 2021-03-10 ENCOUNTER — TELEPHONE (OUTPATIENT)
Dept: FAMILY MEDICINE | Facility: CLINIC | Age: 61
End: 2021-03-10

## 2021-03-10 DIAGNOSIS — Z12.31 ENCOUNTER FOR SCREENING MAMMOGRAM FOR BREAST CANCER: Primary | ICD-10-CM

## 2021-03-13 ENCOUNTER — PATIENT OUTREACH (OUTPATIENT)
Dept: ADMINISTRATIVE | Facility: OTHER | Age: 61
End: 2021-03-13

## 2021-03-17 ENCOUNTER — OFFICE VISIT (OUTPATIENT)
Dept: VASCULAR SURGERY | Facility: CLINIC | Age: 61
End: 2021-03-17
Payer: COMMERCIAL

## 2021-03-17 VITALS
WEIGHT: 189.94 LBS | BODY MASS INDEX: 29.81 KG/M2 | SYSTOLIC BLOOD PRESSURE: 108 MMHG | HEIGHT: 67 IN | DIASTOLIC BLOOD PRESSURE: 62 MMHG

## 2021-03-17 DIAGNOSIS — I87.2 VENOUS INSUFFICIENCY: Primary | ICD-10-CM

## 2021-03-17 DIAGNOSIS — I82.811 VENOUS EMBOLISM AND THROMBOSIS OF SUPERFICIAL VESSELS OF RIGHT LOWER EXTREMITY: ICD-10-CM

## 2021-03-17 PROCEDURE — 99214 OFFICE O/P EST MOD 30 MIN: CPT | Mod: S$GLB,,, | Performed by: SURGERY

## 2021-03-17 PROCEDURE — 3008F BODY MASS INDEX DOCD: CPT | Mod: CPTII,S$GLB,, | Performed by: SURGERY

## 2021-03-17 PROCEDURE — 99214 PR OFFICE/OUTPT VISIT, EST, LEVL IV, 30-39 MIN: ICD-10-PCS | Mod: S$GLB,,, | Performed by: SURGERY

## 2021-03-17 PROCEDURE — 1125F AMNT PAIN NOTED PAIN PRSNT: CPT | Mod: S$GLB,,, | Performed by: SURGERY

## 2021-03-17 PROCEDURE — 99999 PR PBB SHADOW E&M-EST. PATIENT-LVL III: ICD-10-PCS | Mod: PBBFAC,,, | Performed by: SURGERY

## 2021-03-17 PROCEDURE — 3008F PR BODY MASS INDEX (BMI) DOCUMENTED: ICD-10-PCS | Mod: CPTII,S$GLB,, | Performed by: SURGERY

## 2021-03-17 PROCEDURE — 1125F PR PAIN SEVERITY QUANTIFIED, PAIN PRESENT: ICD-10-PCS | Mod: S$GLB,,, | Performed by: SURGERY

## 2021-03-17 PROCEDURE — 99999 PR PBB SHADOW E&M-EST. PATIENT-LVL III: CPT | Mod: PBBFAC,,, | Performed by: SURGERY

## 2021-03-22 ENCOUNTER — PATIENT OUTREACH (OUTPATIENT)
Dept: ADMINISTRATIVE | Facility: HOSPITAL | Age: 61
End: 2021-03-22

## 2021-03-24 ENCOUNTER — TELEPHONE (OUTPATIENT)
Dept: FAMILY MEDICINE | Facility: CLINIC | Age: 61
End: 2021-03-24

## 2021-04-05 ENCOUNTER — OFFICE VISIT (OUTPATIENT)
Dept: FAMILY MEDICINE | Facility: CLINIC | Age: 61
End: 2021-04-05
Payer: COMMERCIAL

## 2021-04-05 VITALS
DIASTOLIC BLOOD PRESSURE: 66 MMHG | BODY MASS INDEX: 29.52 KG/M2 | WEIGHT: 188.06 LBS | OXYGEN SATURATION: 99 % | SYSTOLIC BLOOD PRESSURE: 110 MMHG | HEART RATE: 68 BPM | TEMPERATURE: 98 F | HEIGHT: 67 IN

## 2021-04-05 DIAGNOSIS — I83.90 VARICOSE VEINS OF LOWER EXTREMITY, UNSPECIFIED LATERALITY, UNSPECIFIED WHETHER COMPLICATED: ICD-10-CM

## 2021-04-05 DIAGNOSIS — Z00.00 GENERAL MEDICAL EXAM: ICD-10-CM

## 2021-04-05 DIAGNOSIS — Z12.11 SCREENING FOR MALIGNANT NEOPLASM OF COLON: ICD-10-CM

## 2021-04-05 DIAGNOSIS — R92.8 ABNORMAL MAMMOGRAM: Primary | ICD-10-CM

## 2021-04-05 PROCEDURE — 1126F AMNT PAIN NOTED NONE PRSNT: CPT | Mod: S$GLB,,, | Performed by: FAMILY MEDICINE

## 2021-04-05 PROCEDURE — 99999 PR PBB SHADOW E&M-EST. PATIENT-LVL IV: CPT | Mod: PBBFAC,,, | Performed by: FAMILY MEDICINE

## 2021-04-05 PROCEDURE — 99214 OFFICE O/P EST MOD 30 MIN: CPT | Mod: S$GLB,,, | Performed by: FAMILY MEDICINE

## 2021-04-05 PROCEDURE — 3008F PR BODY MASS INDEX (BMI) DOCUMENTED: ICD-10-PCS | Mod: CPTII,S$GLB,, | Performed by: FAMILY MEDICINE

## 2021-04-05 PROCEDURE — 1126F PR PAIN SEVERITY QUANTIFIED, NO PAIN PRESENT: ICD-10-PCS | Mod: S$GLB,,, | Performed by: FAMILY MEDICINE

## 2021-04-05 PROCEDURE — 3008F BODY MASS INDEX DOCD: CPT | Mod: CPTII,S$GLB,, | Performed by: FAMILY MEDICINE

## 2021-04-05 PROCEDURE — 99214 PR OFFICE/OUTPT VISIT, EST, LEVL IV, 30-39 MIN: ICD-10-PCS | Mod: S$GLB,,, | Performed by: FAMILY MEDICINE

## 2021-04-05 PROCEDURE — 99999 PR PBB SHADOW E&M-EST. PATIENT-LVL IV: ICD-10-PCS | Mod: PBBFAC,,, | Performed by: FAMILY MEDICINE

## 2021-04-07 ENCOUNTER — HOSPITAL ENCOUNTER (OUTPATIENT)
Dept: RADIOLOGY | Facility: HOSPITAL | Age: 61
Discharge: HOME | End: 2021-04-07
Attending: FAMILY MEDICINE
Payer: COMMERCIAL

## 2021-04-07 VITALS — HEIGHT: 67 IN | BODY MASS INDEX: 29.52 KG/M2 | WEIGHT: 188.06 LBS

## 2021-04-07 DIAGNOSIS — R92.8 ABNORMAL MAMMOGRAM: ICD-10-CM

## 2021-04-07 PROCEDURE — 77065 MAMMO DIGITAL DIAGNOSTIC RIGHT WITH TOMO: ICD-10-PCS | Mod: 26,RT,, | Performed by: RADIOLOGY

## 2021-04-07 PROCEDURE — 77065 DX MAMMO INCL CAD UNI: CPT | Mod: 26,RT,, | Performed by: RADIOLOGY

## 2021-04-07 PROCEDURE — 77061 BREAST TOMOSYNTHESIS UNI: CPT | Mod: 26,RT,, | Performed by: RADIOLOGY

## 2021-04-07 PROCEDURE — 77061 MAMMO DIGITAL DIAGNOSTIC RIGHT WITH TOMO: ICD-10-PCS | Mod: 26,RT,, | Performed by: RADIOLOGY

## 2021-04-07 PROCEDURE — 77061 BREAST TOMOSYNTHESIS UNI: CPT | Mod: TC,RT

## 2021-04-12 ENCOUNTER — IMMUNIZATION (OUTPATIENT)
Dept: OBSTETRICS AND GYNECOLOGY | Facility: CLINIC | Age: 61
End: 2021-04-12
Payer: COMMERCIAL

## 2021-04-12 DIAGNOSIS — Z23 NEED FOR VACCINATION: Primary | ICD-10-CM

## 2021-04-12 PROCEDURE — 91300 COVID-19, MRNA, LNP-S, PF, 30 MCG/0.3 ML DOSE VACCINE: ICD-10-PCS | Mod: S$GLB,,, | Performed by: FAMILY MEDICINE

## 2021-04-12 PROCEDURE — 0001A COVID-19, MRNA, LNP-S, PF, 30 MCG/0.3 ML DOSE VACCINE: CPT | Mod: CV19,S$GLB,, | Performed by: FAMILY MEDICINE

## 2021-04-12 PROCEDURE — 91300 COVID-19, MRNA, LNP-S, PF, 30 MCG/0.3 ML DOSE VACCINE: CPT | Mod: S$GLB,,, | Performed by: FAMILY MEDICINE

## 2021-04-12 PROCEDURE — 0001A COVID-19, MRNA, LNP-S, PF, 30 MCG/0.3 ML DOSE VACCINE: ICD-10-PCS | Mod: CV19,S$GLB,, | Performed by: FAMILY MEDICINE

## 2021-04-15 LAB — NONINV COLON CA DNA+OCC BLD SCRN STL QL: NEGATIVE

## 2021-05-05 ENCOUNTER — IMMUNIZATION (OUTPATIENT)
Dept: OBSTETRICS AND GYNECOLOGY | Facility: CLINIC | Age: 61
End: 2021-05-05
Payer: COMMERCIAL

## 2021-05-05 DIAGNOSIS — Z23 NEED FOR VACCINATION: Primary | ICD-10-CM

## 2021-05-05 PROCEDURE — 91300 COVID-19, MRNA, LNP-S, PF, 30 MCG/0.3 ML DOSE VACCINE: CPT | Mod: PBBFAC | Performed by: FAMILY MEDICINE

## 2021-05-05 PROCEDURE — 0002A COVID-19, MRNA, LNP-S, PF, 30 MCG/0.3 ML DOSE VACCINE: CPT | Mod: PBBFAC | Performed by: FAMILY MEDICINE

## 2021-08-06 ENCOUNTER — TELEPHONE (OUTPATIENT)
Dept: FAMILY MEDICINE | Facility: CLINIC | Age: 61
End: 2021-08-06

## 2021-09-30 ENCOUNTER — LAB VISIT (OUTPATIENT)
Dept: LAB | Facility: HOSPITAL | Age: 61
End: 2021-09-30
Attending: FAMILY MEDICINE
Payer: COMMERCIAL

## 2021-09-30 DIAGNOSIS — Z00.00 GENERAL MEDICAL EXAM: ICD-10-CM

## 2021-09-30 DIAGNOSIS — I83.90 VARICOSE VEINS OF LOWER EXTREMITY, UNSPECIFIED LATERALITY, UNSPECIFIED WHETHER COMPLICATED: ICD-10-CM

## 2021-09-30 LAB
ALBUMIN SERPL BCP-MCNC: 3.9 G/DL (ref 3.5–5.2)
ALP SERPL-CCNC: 70 U/L (ref 55–135)
ALT SERPL W/O P-5'-P-CCNC: 19 U/L (ref 10–44)
ANION GAP SERPL CALC-SCNC: 13 MMOL/L (ref 8–16)
AST SERPL-CCNC: 18 U/L (ref 10–40)
BASOPHILS # BLD AUTO: 0.03 K/UL (ref 0–0.2)
BASOPHILS NFR BLD: 0.8 % (ref 0–1.9)
BILIRUB SERPL-MCNC: 0.4 MG/DL (ref 0.1–1)
BUN SERPL-MCNC: 9 MG/DL (ref 8–23)
CALCIUM SERPL-MCNC: 9.7 MG/DL (ref 8.7–10.5)
CHLORIDE SERPL-SCNC: 104 MMOL/L (ref 95–110)
CHOLEST SERPL-MCNC: 198 MG/DL (ref 120–199)
CHOLEST/HDLC SERPL: 2.6 {RATIO} (ref 2–5)
CO2 SERPL-SCNC: 21 MMOL/L (ref 23–29)
CREAT SERPL-MCNC: 0.7 MG/DL (ref 0.5–1.4)
DIFFERENTIAL METHOD: ABNORMAL
EOSINOPHIL # BLD AUTO: 0.1 K/UL (ref 0–0.5)
EOSINOPHIL NFR BLD: 1.9 % (ref 0–8)
ERYTHROCYTE [DISTWIDTH] IN BLOOD BY AUTOMATED COUNT: 12.4 % (ref 11.5–14.5)
EST. GFR  (AFRICAN AMERICAN): >60 ML/MIN/1.73 M^2
EST. GFR  (NON AFRICAN AMERICAN): >60 ML/MIN/1.73 M^2
ESTIMATED AVG GLUCOSE: 123 MG/DL (ref 68–131)
GLUCOSE SERPL-MCNC: 90 MG/DL (ref 70–110)
HBA1C MFR BLD: 5.9 % (ref 4–5.6)
HCT VFR BLD AUTO: 41.1 % (ref 37–48.5)
HDLC SERPL-MCNC: 76 MG/DL (ref 40–75)
HDLC SERPL: 38.4 % (ref 20–50)
HGB BLD-MCNC: 13.2 G/DL (ref 12–16)
IMM GRANULOCYTES # BLD AUTO: 0 K/UL (ref 0–0.04)
IMM GRANULOCYTES NFR BLD AUTO: 0 % (ref 0–0.5)
LDLC SERPL CALC-MCNC: 108 MG/DL (ref 63–159)
LYMPHOCYTES # BLD AUTO: 1.6 K/UL (ref 1–4.8)
LYMPHOCYTES NFR BLD: 41.8 % (ref 18–48)
MCH RBC QN AUTO: 26.9 PG (ref 27–31)
MCHC RBC AUTO-ENTMCNC: 32.1 G/DL (ref 32–36)
MCV RBC AUTO: 84 FL (ref 82–98)
MONOCYTES # BLD AUTO: 0.3 K/UL (ref 0.3–1)
MONOCYTES NFR BLD: 9 % (ref 4–15)
NEUTROPHILS # BLD AUTO: 1.8 K/UL (ref 1.8–7.7)
NEUTROPHILS NFR BLD: 46.5 % (ref 38–73)
NONHDLC SERPL-MCNC: 122 MG/DL
NRBC BLD-RTO: 0 /100 WBC
PLATELET # BLD AUTO: 211 K/UL (ref 150–450)
PMV BLD AUTO: 10.6 FL (ref 9.2–12.9)
POTASSIUM SERPL-SCNC: 4.5 MMOL/L (ref 3.5–5.1)
PROT SERPL-MCNC: 6.9 G/DL (ref 6–8.4)
RBC # BLD AUTO: 4.9 M/UL (ref 4–5.4)
SODIUM SERPL-SCNC: 138 MMOL/L (ref 136–145)
TRIGL SERPL-MCNC: 70 MG/DL (ref 30–150)
TSH SERPL DL<=0.005 MIU/L-ACNC: 2.04 UIU/ML (ref 0.4–4)
WBC # BLD AUTO: 3.78 K/UL (ref 3.9–12.7)

## 2021-09-30 PROCEDURE — 85025 COMPLETE CBC W/AUTO DIFF WBC: CPT | Performed by: FAMILY MEDICINE

## 2021-09-30 PROCEDURE — 80061 LIPID PANEL: CPT | Performed by: FAMILY MEDICINE

## 2021-09-30 PROCEDURE — 36415 COLL VENOUS BLD VENIPUNCTURE: CPT | Mod: PO | Performed by: FAMILY MEDICINE

## 2021-09-30 PROCEDURE — 83036 HEMOGLOBIN GLYCOSYLATED A1C: CPT | Performed by: FAMILY MEDICINE

## 2021-09-30 PROCEDURE — 80053 COMPREHEN METABOLIC PANEL: CPT | Performed by: FAMILY MEDICINE

## 2021-09-30 PROCEDURE — 84443 ASSAY THYROID STIM HORMONE: CPT | Performed by: FAMILY MEDICINE

## 2021-10-04 ENCOUNTER — HOSPITAL ENCOUNTER (OUTPATIENT)
Dept: RADIOLOGY | Facility: HOSPITAL | Age: 61
Discharge: HOME | End: 2021-10-04
Attending: FAMILY MEDICINE
Payer: COMMERCIAL

## 2021-10-04 DIAGNOSIS — Z12.31 ENCOUNTER FOR SCREENING MAMMOGRAM FOR BREAST CANCER: ICD-10-CM

## 2021-10-04 PROCEDURE — 77067 MAMMO DIGITAL SCREENING BILAT WITH TOMO: ICD-10-PCS | Mod: 26,,, | Performed by: RADIOLOGY

## 2021-10-04 PROCEDURE — 77063 BREAST TOMOSYNTHESIS BI: CPT | Mod: 26,,, | Performed by: RADIOLOGY

## 2021-10-04 PROCEDURE — 77063 MAMMO DIGITAL SCREENING BILAT WITH TOMO: ICD-10-PCS | Mod: 26,,, | Performed by: RADIOLOGY

## 2021-10-04 PROCEDURE — 77067 SCR MAMMO BI INCL CAD: CPT | Mod: 26,,, | Performed by: RADIOLOGY

## 2021-10-04 PROCEDURE — 77067 SCR MAMMO BI INCL CAD: CPT | Mod: TC

## 2021-10-07 ENCOUNTER — OFFICE VISIT (OUTPATIENT)
Dept: FAMILY MEDICINE | Facility: CLINIC | Age: 61
End: 2021-10-07
Payer: COMMERCIAL

## 2021-10-07 VITALS
TEMPERATURE: 98 F | DIASTOLIC BLOOD PRESSURE: 76 MMHG | HEIGHT: 67 IN | HEART RATE: 74 BPM | SYSTOLIC BLOOD PRESSURE: 110 MMHG | BODY MASS INDEX: 28.61 KG/M2 | OXYGEN SATURATION: 99 % | WEIGHT: 182.31 LBS

## 2021-10-07 DIAGNOSIS — Z00.00 ANNUAL PHYSICAL EXAM: Primary | ICD-10-CM

## 2021-10-07 DIAGNOSIS — R92.8 ABNORMAL MAMMOGRAM: ICD-10-CM

## 2021-10-07 DIAGNOSIS — Z12.11 SCREENING FOR MALIGNANT NEOPLASM OF COLON: ICD-10-CM

## 2021-10-07 DIAGNOSIS — R07.89 ATYPICAL CHEST PAIN: ICD-10-CM

## 2021-10-07 PROCEDURE — 1160F RVW MEDS BY RX/DR IN RCRD: CPT | Mod: CPTII,S$GLB,, | Performed by: FAMILY MEDICINE

## 2021-10-07 PROCEDURE — 3044F PR MOST RECENT HEMOGLOBIN A1C LEVEL <7.0%: ICD-10-PCS | Mod: CPTII,S$GLB,, | Performed by: FAMILY MEDICINE

## 2021-10-07 PROCEDURE — 99396 PR PREVENTIVE VISIT,EST,40-64: ICD-10-PCS | Mod: S$GLB,,, | Performed by: FAMILY MEDICINE

## 2021-10-07 PROCEDURE — 93010 ELECTROCARDIOGRAM REPORT: CPT | Mod: S$GLB,,, | Performed by: INTERNAL MEDICINE

## 2021-10-07 PROCEDURE — 3008F PR BODY MASS INDEX (BMI) DOCUMENTED: ICD-10-PCS | Mod: CPTII,S$GLB,, | Performed by: FAMILY MEDICINE

## 2021-10-07 PROCEDURE — 99396 PREV VISIT EST AGE 40-64: CPT | Mod: S$GLB,,, | Performed by: FAMILY MEDICINE

## 2021-10-07 PROCEDURE — 99999 PR PBB SHADOW E&M-EST. PATIENT-LVL III: ICD-10-PCS | Mod: PBBFAC,,, | Performed by: FAMILY MEDICINE

## 2021-10-07 PROCEDURE — 99999 PR PBB SHADOW E&M-EST. PATIENT-LVL III: CPT | Mod: PBBFAC,,, | Performed by: FAMILY MEDICINE

## 2021-10-07 PROCEDURE — 93005 EKG 12-LEAD: ICD-10-PCS | Mod: S$GLB,,, | Performed by: FAMILY MEDICINE

## 2021-10-07 PROCEDURE — 3074F PR MOST RECENT SYSTOLIC BLOOD PRESSURE < 130 MM HG: ICD-10-PCS | Mod: CPTII,S$GLB,, | Performed by: FAMILY MEDICINE

## 2021-10-07 PROCEDURE — 93010 EKG 12-LEAD: ICD-10-PCS | Mod: S$GLB,,, | Performed by: INTERNAL MEDICINE

## 2021-10-07 PROCEDURE — 3044F HG A1C LEVEL LT 7.0%: CPT | Mod: CPTII,S$GLB,, | Performed by: FAMILY MEDICINE

## 2021-10-07 PROCEDURE — 3078F PR MOST RECENT DIASTOLIC BLOOD PRESSURE < 80 MM HG: ICD-10-PCS | Mod: CPTII,S$GLB,, | Performed by: FAMILY MEDICINE

## 2021-10-07 PROCEDURE — 1159F MED LIST DOCD IN RCRD: CPT | Mod: CPTII,S$GLB,, | Performed by: FAMILY MEDICINE

## 2021-10-07 PROCEDURE — 3074F SYST BP LT 130 MM HG: CPT | Mod: CPTII,S$GLB,, | Performed by: FAMILY MEDICINE

## 2021-10-07 PROCEDURE — 3078F DIAST BP <80 MM HG: CPT | Mod: CPTII,S$GLB,, | Performed by: FAMILY MEDICINE

## 2021-10-07 PROCEDURE — 93005 ELECTROCARDIOGRAM TRACING: CPT | Mod: S$GLB,,, | Performed by: FAMILY MEDICINE

## 2021-10-07 PROCEDURE — 3008F BODY MASS INDEX DOCD: CPT | Mod: CPTII,S$GLB,, | Performed by: FAMILY MEDICINE

## 2021-10-07 PROCEDURE — 1160F PR REVIEW ALL MEDS BY PRESCRIBER/CLIN PHARMACIST DOCUMENTED: ICD-10-PCS | Mod: CPTII,S$GLB,, | Performed by: FAMILY MEDICINE

## 2021-10-07 PROCEDURE — 1159F PR MEDICATION LIST DOCUMENTED IN MEDICAL RECORD: ICD-10-PCS | Mod: CPTII,S$GLB,, | Performed by: FAMILY MEDICINE

## 2021-10-22 ENCOUNTER — TELEPHONE (OUTPATIENT)
Dept: FAMILY MEDICINE | Facility: CLINIC | Age: 61
End: 2021-10-22

## 2021-10-22 LAB — NONINV COLON CA DNA+OCC BLD SCRN STL QL: NEGATIVE

## 2022-01-11 DIAGNOSIS — J01.00 ACUTE MAXILLARY SINUSITIS, RECURRENCE NOT SPECIFIED: ICD-10-CM

## 2022-01-11 RX ORDER — MECLIZINE HYDROCHLORIDE 25 MG/1
25 TABLET ORAL 3 TIMES DAILY PRN
COMMUNITY
End: 2022-01-11 | Stop reason: SDUPTHER

## 2022-01-11 RX ORDER — PROMETHAZINE HYDROCHLORIDE AND DEXTROMETHORPHAN HYDROBROMIDE 6.25; 15 MG/5ML; MG/5ML
5 SYRUP ORAL EVERY 8 HOURS PRN
Qty: 120 ML | Refills: 0 | Status: SHIPPED | OUTPATIENT
Start: 2022-01-11 | End: 2022-01-21

## 2022-01-11 RX ORDER — MECLIZINE HYDROCHLORIDE 25 MG/1
25 TABLET ORAL 3 TIMES DAILY PRN
Qty: 90 TABLET | Refills: 1 | Status: SHIPPED | OUTPATIENT
Start: 2022-01-11 | End: 2022-04-07 | Stop reason: ALTCHOICE

## 2022-01-11 NOTE — TELEPHONE ENCOUNTER
No new care gaps identified.  Powered by [employer]. Reference number: [id number].   1/11/2022 2:24:27 PM CST

## 2022-01-11 NOTE — TELEPHONE ENCOUNTER
----- Message from Nataliya Hennessy sent at 1/11/2022 12:13 PM CST -----  .Type: RX Refill Request    Who Called: self     Have you contacted your pharmacy:no    Refill or New Rx:refill     RX Name and Strength:meclizine (ANTIVERT) 25 mg tablet, PATIENT IS UNSURE OF rX NAME BUT IT IS FOR HER VERTILIGO     How is the patient currently taking it? (ex. 1XDay):     Is this a 30 day or 90 day RX:    Preferred Pharmacy with phone number: [facility] [address] [place] [address] [address] [place] [address]   Phone:  [phone]  Fax:  [phone]          Local or Mail Order: LOCAL     Ordering Provider: Dr Stiles     Would the patient rather a call back or a response via My [facility]?  Call     Best Call Back Number: [phone] (home)

## 2022-01-11 NOTE — TELEPHONE ENCOUNTER
----- Message from Nataliya Hennessy sent at 1/11/2022  9:13 AM CST -----  Type: RX Refill Request    Who Called: self     Have you contacted your pharmacy: no     Refill or New Rx:refill     RX Name and Strength: promethazine-dextromethorphan     How is the patient currently taking it? (ex. 1XDay): for cough, patient states it is in pill form     Is this a 30 day or 90 day RX    Preferred Pharmacy with phone number: [address]   Phone:  [phone]  Fax:  [phone]          Local or Mail Order: local     Ordering Provider: Dr Stiles     Would the patient rather a call back or a response via [facility]? Call     Best Call Back Number: [phone] (home)

## 2022-02-28 ENCOUNTER — TELEPHONE (OUTPATIENT)
Dept: OPHTHALMOLOGY | Facility: CLINIC | Age: 62
End: 2022-02-28
Payer: COMMERCIAL

## 2022-02-28 NOTE — TELEPHONE ENCOUNTER
----- Message from Khalida Jacobson sent at 2/28/2022  9:36 AM CST -----  Contact: Ciro @[phone]  Pt would like to establish care with this provider. Please give her a call back to assist.

## 2022-03-30 ENCOUNTER — PATIENT OUTREACH (OUTPATIENT)
Dept: ADMINISTRATIVE | Facility: OTHER | Age: 62
End: 2022-03-30
Payer: COMMERCIAL

## 2022-03-30 ENCOUNTER — OFFICE VISIT (OUTPATIENT)
Dept: VASCULAR SURGERY | Facility: CLINIC | Age: 62
End: 2022-03-30
Payer: COMMERCIAL

## 2022-03-30 VITALS
BODY MASS INDEX: 28.88 KG/M2 | WEIGHT: 184 LBS | DIASTOLIC BLOOD PRESSURE: 61 MMHG | HEIGHT: 67 IN | SYSTOLIC BLOOD PRESSURE: 118 MMHG

## 2022-03-30 DIAGNOSIS — I87.2 VENOUS INSUFFICIENCY: Primary | ICD-10-CM

## 2022-03-30 PROCEDURE — 1159F MED LIST DOCD IN RCRD: CPT | Mod: CPTII,S$GLB,, | Performed by: SURGERY

## 2022-03-30 PROCEDURE — 99999 PR PBB SHADOW E&M-EST. PATIENT-LVL III: ICD-10-PCS | Mod: PBBFAC,,, | Performed by: SURGERY

## 2022-03-30 PROCEDURE — 99214 OFFICE O/P EST MOD 30 MIN: CPT | Mod: S$GLB,,, | Performed by: SURGERY

## 2022-03-30 PROCEDURE — 3078F PR MOST RECENT DIASTOLIC BLOOD PRESSURE < 80 MM HG: ICD-10-PCS | Mod: CPTII,S$GLB,, | Performed by: SURGERY

## 2022-03-30 PROCEDURE — 99999 PR PBB SHADOW E&M-EST. PATIENT-LVL III: CPT | Mod: PBBFAC,,, | Performed by: SURGERY

## 2022-03-30 PROCEDURE — 1159F PR MEDICATION LIST DOCUMENTED IN MEDICAL RECORD: ICD-10-PCS | Mod: CPTII,S$GLB,, | Performed by: SURGERY

## 2022-03-30 PROCEDURE — 3074F PR MOST RECENT SYSTOLIC BLOOD PRESSURE < 130 MM HG: ICD-10-PCS | Mod: CPTII,S$GLB,, | Performed by: SURGERY

## 2022-03-30 PROCEDURE — 99214 PR OFFICE/OUTPT VISIT, EST, LEVL IV, 30-39 MIN: ICD-10-PCS | Mod: S$GLB,,, | Performed by: SURGERY

## 2022-03-30 PROCEDURE — 3074F SYST BP LT 130 MM HG: CPT | Mod: CPTII,S$GLB,, | Performed by: SURGERY

## 2022-03-30 PROCEDURE — 3008F BODY MASS INDEX DOCD: CPT | Mod: CPTII,S$GLB,, | Performed by: SURGERY

## 2022-03-30 PROCEDURE — 3008F PR BODY MASS INDEX (BMI) DOCUMENTED: ICD-10-PCS | Mod: CPTII,S$GLB,, | Performed by: SURGERY

## 2022-03-30 PROCEDURE — 3078F DIAST BP <80 MM HG: CPT | Mod: CPTII,S$GLB,, | Performed by: SURGERY

## 2022-03-30 NOTE — PROGRESS NOTES
Kyler Daly MD, RPVI                                 [facility]                                                        03/30/2022    HPI:  Cloie Bodden Reyes is a 61 y.o. female with There is no problem list on file for this patient.   being managed by PCP and specialists who is here today for evaluation of BLE edema.  Patient states location is BLE occurring for a few months.  Associated signs and symptoms include pain.  Quality is heavy and severity is 5/10.  Symptoms began a few mo ago.  Alleviating factors include elevation.  Worsening factors include dependency.  Patient is not wearing compression stockings daily.  No FH of venous disease.  Symptoms do not limit patient's functional status and daily activities.  No DVT history.  No venous interventions.  No low sodium diet.  No excessive water intake.    Migraine with aura: no  PFO/ASD/right to left shunt: no  Pregnant: no  Breastfeeding: no    no MI  no Stroke  Tobacco use: no    3/2021:  C/o RLE edema and plantar foot pain.  Not fully compliant with compression and low Na diet.    3/2022:  Pt with c/o R medial calf pain, unable to take Advil.  +compression and elevation > 3 months.    Past Medical History:   Diagnosis Date    Allergy     Fibrocystic breast     History of cholecystectomy      Past Surgical History:   Procedure Laterality Date    BIOPSY  10/28/2020    BREAST BIOPSY Right 10/28/2020    Benign fibrotic breast tissue with fibroadenomatoid change and duct ectasia    CHOLECYSTECTOMY      HERNIA REPAIR      VAGINAL DELIVERY       Family History   Problem Relation Age of Onset    Cancer Mother         kidney    Endometrial cancer Mother     Diabetes Father     Diabetes Sister     Stroke Brother     Clotting disorder Son     Diabetes Brother     No Known Problems Sister     No Known Problems Son     Endometrial cancer Maternal Grandmother      Social History     Socioeconomic History    Marital status:     Tobacco Use    Smoking status: Never Smoker    Smokeless tobacco: Never Used   Substance and Sexual Activity    Alcohol use: No     Alcohol/week: 0.0 standard drinks    Drug use: No    Sexual activity: Yes     Partners: Male       Current Outpatient Medications:     acetaminophen (TYLENOL) 500 MG tablet, Take 2 tablets every 8 hours by oral route as needed for 14 days., Disp: , Rfl:     aspirin (ECOTRIN) 81 MG EC tablet, Take 81 mg by mouth once. , Disp: , Rfl:     meclizine (ANTIVERT) 25 mg tablet, Take 1 tablet (25 mg total) by mouth 3 (three) times daily as needed for Dizziness., Disp: 90 tablet, Rfl: 1    triamcinolone (NASACORT) 55 mcg nasal inhaler, 2 sprays by Nasal route once daily., Disp: , Rfl:     loratadine (CLARITIN) 10 mg tablet, Take 1 tablet (10 mg total) by mouth daily as needed., Disp: 30 tablet, Rfl: 0    REVIEW OF SYSTEMS:  General: No fevers or chills; ENT: No sore throat; Allergy and Immunology: no persistent infections; Hematological and Lymphatic: No history of bleeding or easy bruising; Endocrine: negative; Respiratory: no cough, shortness of breath, or wheezing; Cardiovascular: no chest pain or dyspnea on exertion; Gastrointestinal: no abdominal pain/back, change in bowel habits, or bloody stools; Genito-Urinary: no dysuria, trouble voiding, or hematuria; Musculoskeletal: edema; Neurological: no TIA or stroke symptoms; Psychiatric: no nervousness, anxiety or depression.    PHYSICAL EXAM:                General appearance:  Alert, well-appearing, and in no distress.  Oriented to person, place, and time                    Neurological: Normal speech, no focal findings noted; CN II - XII grossly intact. RLE with sensation to light touch, LLE with sensation to light touch.            Musculoskeletal: Digits/nail without cyanosis/clubbing.  Strength 5/5 BLE.                    Neck: Supple, no significant adenopathy                  Chest:  No wheezes, symmetric air entry. No  use of accessory muscles               Cardiac: Normal rate and regular rhythm            Abdomen: Soft, nontender, nondistended      Extremities:   2+ R DP pulse, 2+ L DP pulse      1+ RLE edema, 1+ LLE edema    Skin:  RLE no ulcer; LLE no ulcer      RLE + spider veins, LLE + spider veins      RLE no varicose veins, LLE no varicose veins    CEAP 3/3    LAB RESULTS:  No results found for: CBC  No results found for: LABPROT, INR  Lab Results   Component Value Date     09/30/2021    K 4.5 09/30/2021     09/30/2021    CO2 21 (L) 09/30/2021    GLU 90 09/30/2021    BUN 9 09/30/2021    CREATININE 0.7 09/30/2021    CALCIUM 9.7 09/30/2021    ANIONGAP 13 09/30/2021    EGFRNONAA >60.0 09/30/2021     Lab Results   Component Value Date    WBC 3.78 (L) 09/30/2021    RBC 4.90 09/30/2021    HGB 13.2 09/30/2021    HCT 41.1 09/30/2021    MCV 84 09/30/2021    MCH 26.9 (L) 09/30/2021    MCHC 32.1 09/30/2021    RDW 12.4 09/30/2021     09/30/2021    MPV 10.6 09/30/2021    GRAN 1.8 09/30/2021    GRAN 46.5 09/30/2021    LYMPH 1.6 09/30/2021    LYMPH 41.8 09/30/2021    MONO 0.3 09/30/2021    MONO 9.0 09/30/2021    EOS 0.1 09/30/2021    BASO 0.03 09/30/2021    EOSINOPHIL 1.9 09/30/2021    BASOPHIL 0.8 09/30/2021    DIFFMETHOD Automated 09/30/2021     .  Lab Results   Component Value Date    HGBA1C 5.9 (H) 09/30/2021       IMAGING:  All pertinent imaging has been reviewed and interpreted independently.    Venous US 2020 reviewed.  R GSV thrombus, reflux at calf, +SSV reflux, no DVT or LLE reflux    IMP/PLAN:  61 y.o. female with There is no problem list on file for this patient.   being managed by PCP and specialists who is here today for evaluation of BLE edema and pain.    -Venous insuff US  -Recommend warm compresses, NSAID and elevation for thrombophlebitis  -recommend cont compression with Rx stockings (new Rx provided today), elevation, dietary changes associated with water and sodium intake discussed at length with  patient  -Exercise   -RTC 3 months for further evaluation    I spent 11 minutes evaluating this patient and greater than 50% of the time was spent counseling, coordinator care and discussing the plan of care.  All questions were answered and patient stated understanding with agreement with the above treatment plan.    Kyler Daly MD [facility]  Vascular and Endovascular Surgery

## 2022-04-07 ENCOUNTER — OFFICE VISIT (OUTPATIENT)
Dept: FAMILY MEDICINE | Facility: CLINIC | Age: 62
End: 2022-04-07
Payer: COMMERCIAL

## 2022-04-07 ENCOUNTER — TELEPHONE (OUTPATIENT)
Dept: FAMILY MEDICINE | Facility: CLINIC | Age: 62
End: 2022-04-07
Payer: COMMERCIAL

## 2022-04-07 VITALS
SYSTOLIC BLOOD PRESSURE: 94 MMHG | HEIGHT: 67 IN | HEART RATE: 74 BPM | TEMPERATURE: 98 F | DIASTOLIC BLOOD PRESSURE: 60 MMHG | BODY MASS INDEX: 28.75 KG/M2 | WEIGHT: 183.19 LBS | OXYGEN SATURATION: 97 %

## 2022-04-07 DIAGNOSIS — Z12.31 ENCOUNTER FOR SCREENING MAMMOGRAM FOR BREAST CANCER: ICD-10-CM

## 2022-04-07 DIAGNOSIS — Z00.00 GENERAL MEDICAL EXAM: Primary | ICD-10-CM

## 2022-04-07 DIAGNOSIS — I83.90 VARICOSE VEINS OF LOWER EXTREMITY, UNSPECIFIED LATERALITY, UNSPECIFIED WHETHER COMPLICATED: ICD-10-CM

## 2022-04-07 PROCEDURE — 1160F RVW MEDS BY RX/DR IN RCRD: CPT | Mod: CPTII,S$GLB,, | Performed by: FAMILY MEDICINE

## 2022-04-07 PROCEDURE — 1159F MED LIST DOCD IN RCRD: CPT | Mod: CPTII,S$GLB,, | Performed by: FAMILY MEDICINE

## 2022-04-07 PROCEDURE — 1159F PR MEDICATION LIST DOCUMENTED IN MEDICAL RECORD: ICD-10-PCS | Mod: CPTII,S$GLB,, | Performed by: FAMILY MEDICINE

## 2022-04-07 PROCEDURE — 3078F PR MOST RECENT DIASTOLIC BLOOD PRESSURE < 80 MM HG: ICD-10-PCS | Mod: CPTII,S$GLB,, | Performed by: FAMILY MEDICINE

## 2022-04-07 PROCEDURE — 3078F DIAST BP <80 MM HG: CPT | Mod: CPTII,S$GLB,, | Performed by: FAMILY MEDICINE

## 2022-04-07 PROCEDURE — 99214 OFFICE O/P EST MOD 30 MIN: CPT | Mod: S$GLB,,, | Performed by: FAMILY MEDICINE

## 2022-04-07 PROCEDURE — 1160F PR REVIEW ALL MEDS BY PRESCRIBER/CLIN PHARMACIST DOCUMENTED: ICD-10-PCS | Mod: CPTII,S$GLB,, | Performed by: FAMILY MEDICINE

## 2022-04-07 PROCEDURE — 3008F BODY MASS INDEX DOCD: CPT | Mod: CPTII,S$GLB,, | Performed by: FAMILY MEDICINE

## 2022-04-07 PROCEDURE — 3074F SYST BP LT 130 MM HG: CPT | Mod: CPTII,S$GLB,, | Performed by: FAMILY MEDICINE

## 2022-04-07 PROCEDURE — 99999 PR PBB SHADOW E&M-EST. PATIENT-LVL IV: ICD-10-PCS | Mod: PBBFAC,,, | Performed by: FAMILY MEDICINE

## 2022-04-07 PROCEDURE — 99999 PR PBB SHADOW E&M-EST. PATIENT-LVL IV: CPT | Mod: PBBFAC,,, | Performed by: FAMILY MEDICINE

## 2022-04-07 PROCEDURE — 3074F PR MOST RECENT SYSTOLIC BLOOD PRESSURE < 130 MM HG: ICD-10-PCS | Mod: CPTII,S$GLB,, | Performed by: FAMILY MEDICINE

## 2022-04-07 PROCEDURE — 3008F PR BODY MASS INDEX (BMI) DOCUMENTED: ICD-10-PCS | Mod: CPTII,S$GLB,, | Performed by: FAMILY MEDICINE

## 2022-04-07 PROCEDURE — 99214 PR OFFICE/OUTPT VISIT, EST, LEVL IV, 30-39 MIN: ICD-10-PCS | Mod: S$GLB,,, | Performed by: FAMILY MEDICINE

## 2022-04-07 NOTE — TELEPHONE ENCOUNTER
----- Message from Kasia Garcia MD sent at 4/7/2022 10:32 AM CDT -----  Good morning,    I do not remember , and I may have made that mistake at the time I saw her , or miscommunication,   I am not sure ,   but I reviewed the case .     She had a benign right breast  bx on 10/28/2020.    A 6 month right breast post biopsy follow up 4/7/2021,      A normal bilateral mammo  in 10/4/2021,    and at this time I recommend a mammogram in 10/4/2022    I apologize if I created some confusion for her , I sincerely do not remember,      If you need to call me   I am at [phone]    Kasia SWEENEY        ----- Message -----  From: Vivi Stiles MD  Sent: 4/7/2022   9:11 AM CDT  To: Kasia Garcia MD    Hi Dr Garcia,     This patient is insistent that you told her yourself that she needed another mammogram in 6 months. In the note, it says repeat in 1 year. Please clarify and edit note if she needs an appointment now or in October 2022.    Thank you,  Vivi Stiles MD

## 2022-04-07 NOTE — TELEPHONE ENCOUNTER
Please contact patient   Please advise her that I message the radiologist that completed her breast evaluation in October 2021. She reviewed her chart and images..  It is recommended that she have a screening mammogram in October 2022. She does not need a study at this time.

## 2022-04-07 NOTE — PROGRESS NOTES
"Routine Office Visit    Cloie Bodden Reyes  1960  [id number]      Subjective     Ciro is a 61 y.o. female who presents today for:    1. Stress - Patient has her house to take care of and is also helping her son take care of his new house. She has been more forgetful. She had some leg pain and was evaluated by vascular and advised she needed an ultrasound. She was reminded about the ultrasound - which is for tomorrow.   2. Abnormal mammogram - she was advised she needed a repeat diagnostic mammogram in 6 months for monitoring     Objective     Review of Systems   Constitutional: Negative for chills and fever.   HENT: Negative for congestion.    Eyes: Negative for blurred vision.   Respiratory: Negative for cough.    Cardiovascular: Negative for chest pain.   Gastrointestinal: Negative for abdominal pain, constipation, diarrhea, heartburn, nausea and vomiting.   Genitourinary: Negative for dysuria.   Musculoskeletal: Negative for myalgias.   Skin: Negative for itching and rash.   Neurological: Negative for dizziness and headaches.   Psychiatric/Behavioral: Negative for depression.       BP 94/60 (BP Location: Left arm, Patient Position: Sitting, BP Method: Large (Manual))   Pulse 74   Temp 98.2 °F (36.8 °C) (Oral)   Ht 5' 7" (1.702 m)   Wt 83.1 kg (183 lb 3.2 oz)   SpO2 97%   BMI 28.69 kg/m²   Physical Exam  Constitutional:       Appearance: She is well-developed.   HENT:      Head: Normocephalic and atraumatic.   Eyes:      Conjunctiva/sclera: Conjunctivae normal.      Pupils: Pupils are equal, round, and reactive to light.   Cardiovascular:      Rate and Rhythm: Normal rate and regular rhythm.      Heart sounds: Normal heart sounds. No murmur heard.    No friction rub. No gallop.   Pulmonary:      Effort: No respiratory distress.      Breath sounds: Normal breath sounds.   Abdominal:      General: Bowel sounds are normal. There is no distension.      Palpations: Abdomen is soft.      Tenderness: There is " no abdominal tenderness.   Musculoskeletal:         General: Normal range of motion.      Cervical back: Normal range of motion and neck supple.   Lymphadenopathy:      Cervical: No cervical adenopathy.   Skin:     General: Skin is warm.   Neurological:      Mental Status: She is alert and oriented to person, place, and time.            Assessment     Problem List Items Addressed This Visit    None     Visit Diagnoses     General medical exam    -  Primary    Relevant Orders    CBC Auto Differential    Comprehensive Metabolic Panel    Lipid Panel    Hemoglobin A1C    TSH    Encounter for screening mammogram for breast cancer        Relevant Orders    Mammo Digital Screening Bilat w/ Jon[facility]  Messaged Dr. Garcia regarding patient follow-up.   Confirm that repeat mammogram in October       Varicose veins of lower extremity, unspecified laterality, unspecified whether complicated      Ultrasound tomorrow   Continue f/u with Dr. Daly           Follow up in about 6 months (around 10/7/2022), or if symptoms worsen or fail to improve.

## 2022-04-08 ENCOUNTER — HOSPITAL ENCOUNTER (OUTPATIENT)
Dept: CARDIOLOGY | Facility: HOSPITAL | Age: 62
Discharge: HOME | End: 2022-04-08
Attending: SURGERY
Payer: COMMERCIAL

## 2022-04-08 DIAGNOSIS — I87.2 VENOUS INSUFFICIENCY: ICD-10-CM

## 2022-04-08 PROCEDURE — 93970 EXTREMITY STUDY: CPT | Mod: 26,,, | Performed by: SURGERY

## 2022-04-08 PROCEDURE — 93970 CV US LOWER VENOUS INSUFFICIENCY BILATERAL (CUPID ONLY): ICD-10-PCS | Mod: 26,,, | Performed by: SURGERY

## 2022-04-08 PROCEDURE — 93970 EXTREMITY STUDY: CPT | Mod: TC

## 2022-04-20 LAB
LEFT GREAT SAPHENOUS DISTAL THIGH DIA: 0.1 CM
LEFT GREAT SAPHENOUS JUNCTION DIA: 0.8 CM
LEFT GREAT SAPHENOUS KNEE DIA: 0.2 CM
LEFT GREAT SAPHENOUS MIDDLE THIGH DIA: 0.3 CM
LEFT GREAT SAPHENOUS PROXIMAL CALF DIA: 0.2 CM
LEFT SMALL SAPHENOUS KNEE DIA: 0.2 CM
LEFT SMALL SAPHENOUS SPJ DIA: 0.1 CM
RIGHT GREAT SAPHENOUS DISTAL THIGH DIA: 0.4 CM
RIGHT GREAT SAPHENOUS DISTAL THIGH REFLUX: 7687 MS
RIGHT GREAT SAPHENOUS JUNCTION DIA: 0.6 CM
RIGHT GREAT SAPHENOUS KNEE DIA: 0.5 CM
RIGHT GREAT SAPHENOUS KNEE REFLUX: 6911 MS
RIGHT GREAT SAPHENOUS MIDDLE THIGH DIA: 0.3 CM
RIGHT GREAT SAPHENOUS MIDDLE THIGH REFLUX: 3212 MS
RIGHT GREAT SAPHENOUS PROXIMAL CALF DIA: 0.4 CM
RIGHT GREAT SAPHENOUS PROXIMAL CALF REFLUX: 7490 MS
RIGHT SMALL SAPHENOUS KNEE DIA: 0.3 CM
RIGHT SMALL SAPHENOUS KNEE REFLUX: 1880 MS
RIGHT SMALL SAPHENOUS SPJ DIA: 0.3 CM

## 2022-04-22 ENCOUNTER — OFFICE VISIT (OUTPATIENT)
Dept: OPHTHALMOLOGY | Facility: CLINIC | Age: 62
End: 2022-04-22
Payer: COMMERCIAL

## 2022-04-22 DIAGNOSIS — H52.7 REFRACTIVE ERROR: ICD-10-CM

## 2022-04-22 DIAGNOSIS — H25.13 NUCLEAR SCLEROSIS OF BOTH EYES: Primary | ICD-10-CM

## 2022-04-22 PROCEDURE — 1159F MED LIST DOCD IN RCRD: CPT | Mod: CPTII,S$GLB,, | Performed by: OPHTHALMOLOGY

## 2022-04-22 PROCEDURE — 99999 PR PBB SHADOW E&M-EST. PATIENT-LVL III: ICD-10-PCS | Mod: PBBFAC,,, | Performed by: OPHTHALMOLOGY

## 2022-04-22 PROCEDURE — 1159F PR MEDICATION LIST DOCUMENTED IN MEDICAL RECORD: ICD-10-PCS | Mod: CPTII,S$GLB,, | Performed by: OPHTHALMOLOGY

## 2022-04-22 PROCEDURE — 92002 INTRM OPH EXAM NEW PATIENT: CPT | Mod: S$GLB,,, | Performed by: OPHTHALMOLOGY

## 2022-04-22 PROCEDURE — 1160F PR REVIEW ALL MEDS BY PRESCRIBER/CLIN PHARMACIST DOCUMENTED: ICD-10-PCS | Mod: CPTII,S$GLB,, | Performed by: OPHTHALMOLOGY

## 2022-04-22 PROCEDURE — 99999 PR PBB SHADOW E&M-EST. PATIENT-LVL III: CPT | Mod: PBBFAC,,, | Performed by: OPHTHALMOLOGY

## 2022-04-22 PROCEDURE — 1160F RVW MEDS BY RX/DR IN RCRD: CPT | Mod: CPTII,S$GLB,, | Performed by: OPHTHALMOLOGY

## 2022-04-22 PROCEDURE — 92002 PR EYE EXAM, NEW PATIENT,INTERMED: ICD-10-PCS | Mod: S$GLB,,, | Performed by: OPHTHALMOLOGY

## 2022-04-22 NOTE — PROGRESS NOTES
Subjective:       Patient ID: Cloie Bodden Reyes is a 61 y.o. female.    Chief Complaint: Concerns About Ocular Health    HPI     New Patient    Patient here today with c/o blurry VA ou. No pain. No allergy/prince. No f/f.   No previous sx or injury ou. No fmhx of eye disease.    AT ou prn    Last edited by Cherelle Espinoza on 4/22/2022  1:31 PM. (History)             Assessment:       1. Nuclear sclerosis of both eyes    2. Refractive error        Plan:       Cataracts- Not visually significant.  RE-Pt wants MRx.      Give MRx.  RTC 6 mos for DFE.      Will be out in 3 days

## 2022-04-29 ENCOUNTER — TELEPHONE (OUTPATIENT)
Dept: FAMILY MEDICINE | Facility: CLINIC | Age: 62
End: 2022-04-29
Payer: COMMERCIAL

## 2022-05-02 ENCOUNTER — OFFICE VISIT (OUTPATIENT)
Dept: FAMILY MEDICINE | Facility: CLINIC | Age: 62
End: 2022-05-02
Payer: COMMERCIAL

## 2022-05-02 VITALS
DIASTOLIC BLOOD PRESSURE: 80 MMHG | WEIGHT: 182.56 LBS | HEIGHT: 67 IN | OXYGEN SATURATION: 97 % | SYSTOLIC BLOOD PRESSURE: 108 MMHG | HEART RATE: 74 BPM | TEMPERATURE: 97 F | BODY MASS INDEX: 28.65 KG/M2

## 2022-05-02 DIAGNOSIS — R11.0 NAUSEA: ICD-10-CM

## 2022-05-02 DIAGNOSIS — K52.9 GASTROENTERITIS: Primary | ICD-10-CM

## 2022-05-02 PROCEDURE — 3079F DIAST BP 80-89 MM HG: CPT | Mod: CPTII,S$GLB,, | Performed by: FAMILY MEDICINE

## 2022-05-02 PROCEDURE — 3074F SYST BP LT 130 MM HG: CPT | Mod: CPTII,S$GLB,, | Performed by: FAMILY MEDICINE

## 2022-05-02 PROCEDURE — 1159F PR MEDICATION LIST DOCUMENTED IN MEDICAL RECORD: ICD-10-PCS | Mod: CPTII,S$GLB,, | Performed by: FAMILY MEDICINE

## 2022-05-02 PROCEDURE — 1160F PR REVIEW ALL MEDS BY PRESCRIBER/CLIN PHARMACIST DOCUMENTED: ICD-10-PCS | Mod: CPTII,S$GLB,, | Performed by: FAMILY MEDICINE

## 2022-05-02 PROCEDURE — 99214 OFFICE O/P EST MOD 30 MIN: CPT | Mod: S$GLB,,, | Performed by: FAMILY MEDICINE

## 2022-05-02 PROCEDURE — 99214 PR OFFICE/OUTPT VISIT, EST, LEVL IV, 30-39 MIN: ICD-10-PCS | Mod: S$GLB,,, | Performed by: FAMILY MEDICINE

## 2022-05-02 PROCEDURE — 3079F PR MOST RECENT DIASTOLIC BLOOD PRESSURE 80-89 MM HG: ICD-10-PCS | Mod: CPTII,S$GLB,, | Performed by: FAMILY MEDICINE

## 2022-05-02 PROCEDURE — 1159F MED LIST DOCD IN RCRD: CPT | Mod: CPTII,S$GLB,, | Performed by: FAMILY MEDICINE

## 2022-05-02 PROCEDURE — 3074F PR MOST RECENT SYSTOLIC BLOOD PRESSURE < 130 MM HG: ICD-10-PCS | Mod: CPTII,S$GLB,, | Performed by: FAMILY MEDICINE

## 2022-05-02 PROCEDURE — 3008F PR BODY MASS INDEX (BMI) DOCUMENTED: ICD-10-PCS | Mod: CPTII,S$GLB,, | Performed by: FAMILY MEDICINE

## 2022-05-02 PROCEDURE — 99999 PR PBB SHADOW E&M-EST. PATIENT-LVL III: CPT | Mod: PBBFAC,,, | Performed by: FAMILY MEDICINE

## 2022-05-02 PROCEDURE — 99999 PR PBB SHADOW E&M-EST. PATIENT-LVL III: ICD-10-PCS | Mod: PBBFAC,,, | Performed by: FAMILY MEDICINE

## 2022-05-02 PROCEDURE — 3008F BODY MASS INDEX DOCD: CPT | Mod: CPTII,S$GLB,, | Performed by: FAMILY MEDICINE

## 2022-05-02 PROCEDURE — 1160F RVW MEDS BY RX/DR IN RCRD: CPT | Mod: CPTII,S$GLB,, | Performed by: FAMILY MEDICINE

## 2022-05-02 NOTE — PROGRESS NOTES
"Physical Exam  /80 (BP Location: Left arm, Patient Position: Sitting, BP Method: Medium (Manual))   Pulse 74   Temp 97.4 °F (36.3 °C) (Oral)   Ht 5' 7" (1.702 m)   Wt 82.8 kg (182 lb 8.7 oz)   SpO2 97%   BMI 28.59 kg/m²      Office Visit    Patient Name: Cloie Bodden Reyes    : 1960  MRN: [id number]      Assessment/Plan:  Cloie Bodden Reyes is a 61 y.o. female who presents today for :    Gastroenteritis  Nausea  -resolved, AFVSS, nontoxic-appearing-  -advised adequate hydration.      Follow up PRN    This note was created by combination of typed  and MModal dictation.  Transcription errors may be present.  If there are any questions, please contact me.      ----------------------------------------------------------------------------------------------------------------------      HPI:  Patient Care Team:  Vivi Stiles MD as PCP - General (Family Medicine)  Mckenna Zamudio LPN as Licensed Practical Nurse    Ciro is a 61 y.o. female with    There is no problem list on file for this patient.    This patient is new to me       Patient with PMHx as above presents today for follow up of stomach virus from last week. She was eating at a party over a week ago, and felt sick the next day, associated with upper abd disomfort as well as nausea, no vomiting. She felt sick overall for about a week, with decreased appetite. She made this appointment 5 days ago, but says that her Sx resolved two days later. Since then, her appetite has returned to normal. She denies any F/C/mm aches/SOB/CP/constipation/dysuria/melena/hematochezia    Additional ROS      CONST: no fever, no activity change, weight stable.   EYES: no vision change.   ENT: no sore throat. No dysphagia.   CV: no CP with exertion  RESP: no SOB  GI: no N/V/diarrhea/constipation  : no urinary concerns  MSK: no new myalgias or arthralgias. +chronic stable LBP  SKIN: no new rashes  NEURO: no focal deficits.   PSYCH: no new issues. "   ENDOCRINE: no polyuria.               There is no problem list on file for this patient.      Current Medications  Medications reviewed/updated.     Current Outpatient Medications on File Prior to Visit   Medication Sig Dispense Refill    acetaminophen (TYLENOL) 500 MG tablet Take 2 tablets every 8 hours by oral route as needed for 14 days.      aspirin (ECOTRIN) 81 MG EC tablet Take 81 mg by mouth once.       loratadine (CLARITIN) 10 mg tablet Take 1 tablet (10 mg total) by mouth daily as needed. 30 tablet 0    triamcinolone (NASACORT) 55 mcg nasal inhaler 2 sprays by Nasal route once daily.       No current facility-administered medications on file prior to visit.           Past Surgical History:   Procedure Laterality Date    BIOPSY  10/28/2020    BREAST BIOPSY Right 10/28/2020    Benign fibrotic breast tissue with fibroadenomatoid change and duct ectasia    CHOLECYSTECTOMY      HERNIA REPAIR      VAGINAL DELIVERY         Family History   Problem Relation Age of Onset    Cancer Mother         kidney    Endometrial cancer Mother     Diabetes Father     Diabetes Sister     Stroke Brother     Clotting disorder Son     Diabetes Brother     No Known Problems Sister     No Known Problems Son     Endometrial cancer Maternal Grandmother     Amblyopia Neg Hx     Blindness Neg Hx     Cataracts Neg Hx     Glaucoma Neg Hx     Macular degeneration Neg Hx     Retinal detachment Neg Hx     Strabismus Neg Hx        Social History     Socioeconomic History    Marital status:    Tobacco Use    Smoking status: Never Smoker    Smokeless tobacco: Never Used   Substance and Sexual Activity    Alcohol use: No     Alcohol/week: 0.0 standard drinks    Drug use: No    Sexual activity: Yes     Partners: Male             Allergies   Review of patient's allergies indicates:   Allergen Reactions    Ibuprofen Other (See Comments)     Leg and hand             Review of Systems  See  "HPI      [unfilled]  /80 (BP Location: Left arm, Patient Position: Sitting, BP Method: Medium (Manual))   Pulse 74   Temp 97.4 °F (36.3 °C) (Oral)   Ht 5' 7" (1.702 m)   Wt 82.8 kg (182 lb 8.7 oz)   SpO2 97%   BMI 28.59 kg/m²       GEN: NAD, pleasant  HEENT: NCAT, PERRLA, EOMI, sclera clear, anicteric  NECK: normal, supple  LUNGS: CTAB, no w/r/r, normal respiratory effort  HEART: RRR, normal S1 and S2, no m/r/g, no palpitations, no edema  ABD: no generalized abd TTP, soft/non-distended, NABS, no organomegaly, no masses, no hernias, no rebound, no guarding. No RLQ/LLQ TTP. Suprapubic tenderness absent. No CVA tenderness.  SKIN: warm and dry with normal turgor, no rashes, no other lesions.   PSYCH: AOx3, appropriate mood and affect.   MSK: extremities warm/well perfused, normal ROM in all 4 extremities, no c/c/e.   NEURO: normal without focal findings, CN II-XII are intact      "

## 2022-06-30 ENCOUNTER — OFFICE VISIT (OUTPATIENT)
Dept: VASCULAR SURGERY | Facility: CLINIC | Age: 62
End: 2022-06-30
Payer: COMMERCIAL

## 2022-06-30 VITALS
SYSTOLIC BLOOD PRESSURE: 114 MMHG | WEIGHT: 184.75 LBS | DIASTOLIC BLOOD PRESSURE: 78 MMHG | BODY MASS INDEX: 28.94 KG/M2

## 2022-06-30 DIAGNOSIS — I78.1 SPIDER VEIN, SYMPTOMATIC: Primary | ICD-10-CM

## 2022-06-30 DIAGNOSIS — I87.303 VENOUS HYPERTENSION OF BOTH LOWER EXTREMITIES: ICD-10-CM

## 2022-06-30 DIAGNOSIS — I83.899 SYMPTOMATIC SPIDER VARICOSE VEIN: Primary | ICD-10-CM

## 2022-06-30 PROCEDURE — 3074F PR MOST RECENT SYSTOLIC BLOOD PRESSURE < 130 MM HG: ICD-10-PCS | Mod: CPTII,S$GLB,, | Performed by: SURGERY

## 2022-06-30 PROCEDURE — 3008F BODY MASS INDEX DOCD: CPT | Mod: CPTII,S$GLB,, | Performed by: SURGERY

## 2022-06-30 PROCEDURE — 99214 PR OFFICE/OUTPT VISIT, EST, LEVL IV, 30-39 MIN: ICD-10-PCS | Mod: S$GLB,,, | Performed by: SURGERY

## 2022-06-30 PROCEDURE — 99999 PR PBB SHADOW E&M-EST. PATIENT-LVL III: CPT | Mod: PBBFAC,,, | Performed by: SURGERY

## 2022-06-30 PROCEDURE — 3008F PR BODY MASS INDEX (BMI) DOCUMENTED: ICD-10-PCS | Mod: CPTII,S$GLB,, | Performed by: SURGERY

## 2022-06-30 PROCEDURE — 3078F DIAST BP <80 MM HG: CPT | Mod: CPTII,S$GLB,, | Performed by: SURGERY

## 2022-06-30 PROCEDURE — 3078F PR MOST RECENT DIASTOLIC BLOOD PRESSURE < 80 MM HG: ICD-10-PCS | Mod: CPTII,S$GLB,, | Performed by: SURGERY

## 2022-06-30 PROCEDURE — 99999 PR PBB SHADOW E&M-EST. PATIENT-LVL III: ICD-10-PCS | Mod: PBBFAC,,, | Performed by: SURGERY

## 2022-06-30 PROCEDURE — 99214 OFFICE O/P EST MOD 30 MIN: CPT | Mod: S$GLB,,, | Performed by: SURGERY

## 2022-06-30 PROCEDURE — 1159F PR MEDICATION LIST DOCUMENTED IN MEDICAL RECORD: ICD-10-PCS | Mod: CPTII,S$GLB,, | Performed by: SURGERY

## 2022-06-30 PROCEDURE — 3074F SYST BP LT 130 MM HG: CPT | Mod: CPTII,S$GLB,, | Performed by: SURGERY

## 2022-06-30 PROCEDURE — 1159F MED LIST DOCD IN RCRD: CPT | Mod: CPTII,S$GLB,, | Performed by: SURGERY

## 2022-06-30 NOTE — PROGRESS NOTES
Kyler Daly MD, RPVI                                 [facility]                                                        06/30/2022    HPI:  Cloie Bodden Reyes is a 61 y.o. female with There is no problem list on file for this patient.   being managed by PCP and specialists who is here today for evaluation of BLE edema.  Patient states location is BLE occurring for a few months.  Associated signs and symptoms include pain.  Quality is heavy and severity is 5/10.  Symptoms began a few mo ago.  Alleviating factors include elevation.  Worsening factors include dependency.  Patient is not wearing compression stockings daily.  No FH of venous disease.  Symptoms do not limit patient's functional status and daily activities.  No DVT history.  No venous interventions.  No low sodium diet.  No excessive water intake.    Migraine with aura: no  PFO/ASD/right to left shunt: no  Pregnant: no  Breastfeeding: no    no MI  no Stroke  Tobacco use: no    3/2021:  C/o RLE edema and plantar foot pain.  Not fully compliant with compression and low Na diet.    3/2022:  Pt with c/o R medial calf pain, unable to take Advil.  +compression and elevation > 3 months.    6/2022:  C/o RLE edema and pain and painful spider veins despite compression and elevation > 3 mo    Past Medical History:   Diagnosis Date    Allergy     Fibrocystic breast     History of cholecystectomy      Past Surgical History:   Procedure Laterality Date    BIOPSY  10/28/2020    BREAST BIOPSY Right 10/28/2020    Benign fibrotic breast tissue with fibroadenomatoid change and duct ectasia    CHOLECYSTECTOMY      HERNIA REPAIR      VAGINAL DELIVERY       Family History   Problem Relation Age of Onset    Cancer Mother         kidney    Endometrial cancer Mother     Diabetes Father     Diabetes Sister     Stroke Brother     Clotting disorder Son     Diabetes Brother     No Known Problems Sister     No Known Problems Son      Endometrial cancer Maternal Grandmother     Amblyopia Neg Hx     Blindness Neg Hx     Cataracts Neg Hx     Glaucoma Neg Hx     Macular degeneration Neg Hx     Retinal detachment Neg Hx     Strabismus Neg Hx      Social History     Socioeconomic History    Marital status:    Tobacco Use    Smoking status: Never Smoker    Smokeless tobacco: Never Used   Substance and Sexual Activity    Alcohol use: No     Alcohol/week: 0.0 standard drinks    Drug use: No    Sexual activity: Yes     Partners: Male       Current Outpatient Medications:     acetaminophen (TYLENOL) 500 MG tablet, Take 2 tablets every 8 hours by oral route as needed for 14 days., Disp: , Rfl:     aspirin (ECOTRIN) 81 MG EC tablet, Take 81 mg by mouth once. , Disp: , Rfl:     triamcinolone (NASACORT) 55 mcg nasal inhaler, 2 sprays by Nasal route once daily., Disp: , Rfl:     loratadine (CLARITIN) 10 mg tablet, Take 1 tablet (10 mg total) by mouth daily as needed., Disp: 30 tablet, Rfl: 0    REVIEW OF SYSTEMS:  General: No fevers or chills; ENT: No sore throat; Allergy and Immunology: no persistent infections; Hematological and Lymphatic: No history of bleeding or easy bruising; Endocrine: negative; Respiratory: no cough, shortness of breath, or wheezing; Cardiovascular: no chest pain or dyspnea on exertion; Gastrointestinal: no abdominal pain/back, change in bowel habits, or bloody stools; Genito-Urinary: no dysuria, trouble voiding, or hematuria; Musculoskeletal: edema; Neurological: no TIA or stroke symptoms; Psychiatric: no nervousness, anxiety or depression.    PHYSICAL EXAM:                General appearance:  Alert, well-appearing, and in no distress.  Oriented to person, place, and time                    Neurological: Normal speech, no focal findings noted; CN II - XII grossly intact. RLE with sensation to light touch, LLE with sensation to light touch.            Musculoskeletal: Digits/nail without cyanosis/clubbing.   Strength 5/5 BLE.                    Neck: Supple, no significant adenopathy                  Chest:  No wheezes, symmetric air entry. No use of accessory muscles               Cardiac: Normal rate and regular rhythm            Abdomen: Soft, nontender, nondistended      Extremities:   2+ R DP pulse, 2+ L DP pulse      1+ RLE edema, 1+ LLE edema    Skin:  RLE no ulcer; LLE no ulcer      RLE + tender spider veins, LLE + spider veins      RLE no varicose veins, LLE no varicose veins    CEAP 3/3    LAB RESULTS:  No results found for: CBC  No results found for: LABPROT, INR  Lab Results   Component Value Date     09/30/2021    K 4.5 09/30/2021     09/30/2021    CO2 21 (L) 09/30/2021    GLU 90 09/30/2021    BUN 9 09/30/2021    CREATININE 0.7 09/30/2021    CALCIUM 9.7 09/30/2021    ANIONGAP 13 09/30/2021    EGFRNONAA >60.0 09/30/2021     Lab Results   Component Value Date    WBC 3.78 (L) 09/30/2021    RBC 4.90 09/30/2021    HGB 13.2 09/30/2021    HCT 41.1 09/30/2021    MCV 84 09/30/2021    MCH 26.9 (L) 09/30/2021    MCHC 32.1 09/30/2021    RDW 12.4 09/30/2021     09/30/2021    MPV 10.6 09/30/2021    GRAN 1.8 09/30/2021    GRAN 46.5 09/30/2021    LYMPH 1.6 09/30/2021    LYMPH 41.8 09/30/2021    MONO 0.3 09/30/2021    MONO 9.0 09/30/2021    EOS 0.1 09/30/2021    BASO 0.03 09/30/2021    EOSINOPHIL 1.9 09/30/2021    BASOPHIL 0.8 09/30/2021    DIFFMETHOD Automated 09/30/2021     .  Lab Results   Component Value Date    HGBA1C 5.9 (H) 09/30/2021       IMAGING:  All pertinent imaging has been reviewed and interpreted independently.    Venous US 2020 reviewed.  R GSV thrombus, reflux at calf, +SSV reflux, no DVT or LLE reflux    Venous insuff 2022:  1. Color flow evaluation of the right lower extremity demonstrates no evidence of venous thrombosis in the deep or superficial veins. Significant reflux is noted in the GSV from the mid thigh to calf.  Reflux does not include the saphenofemoral junction (SFJ).    Significant reflux is noted in the SSV.  Reflux does include the saphenopopliteal junction (SPJ).  There is no evidence of reflux in the Accessory vein.  2. Color flow evaluation of the left lower extremity demonstrates no evidence of venous thrombosis in the deep or superficial veins. Significant reflux is not noted in the GSV.  Reflux does not include the saphenofemoral junction (SFJ).   Significant reflux is not noted in the SSV.  Reflux does not include the saphenopopliteal junction (SPJ).  There is no evidence of reflux in the Accessory vein.    IMP/PLAN:  61 y.o. female with There is no problem list on file for this patient.   being managed by PCP and specialists who is here today for evaluation of BLE edema and pain.    -Recommend warm compresses, NSAID and elevation for thrombophlebitis prn  -recommend cont compression with Rx stockings, elevation, dietary changes associated with water and sodium intake discussed at length with patient  -Exercise   -Rec RLE sclerotherapy    I spent 11 minutes evaluating this patient and greater than 50% of the time was spent counseling, coordinator care and discussing the plan of care.  All questions were answered and patient stated understanding with agreement with the above treatment plan.    Kyler Daly MD [facility]  Vascular and Endovascular Surgery

## 2022-06-30 NOTE — LETTER
June 30, 2022        Vivi Stiles MD  [address] [facility]  [place] [address]             [facility] - Vascular Surgery  [address]  [facility] [address]  Phone: [phone]  Fax: [phone]   Patient: Cloie Bodden Reyes   MR Number: [id number]   YOB: 1960   Date of Visit: 6/30/2022       Dear Dr. Stiles:    Thank you for referring Cloie Reyes to me for evaluation. Below are the relevant portions of my assessment and plan of care.            If you have questions, please do not hesitate to call me. I look forward to following Ciro along with you.    Sincerely,      Kyler Daly MD           CC  No Recipients

## 2022-06-30 NOTE — PATIENT INSTRUCTIONS
Putting on Compression Stockings     Turn the stocking inside-out, then fit it over your toes and heel.          Roll the stocking up your leg.            Once stockings are on, make sure the top of the stocking is about two fingers width below the crease of the knee (or the groin if you wear thigh-high stockings).          Use equipment, such as a stocking ramirez, or wear rubber gloves to make it easier to put on compression stockings.         Elastic compression stockings are prescribed to treat many vein problems. Wearing them may be the most important thing you do to manage your symptoms. The stockings fit tightly around your ankle, gradually reducing in pressure as they go up your legs. This helps keep blood flowing to your heart. As a result, swelling is reduced. Your healthcare provider will prescribe stockings at a safe pressure for you. He or she will also tell you how often to wear and remove the stockings. Follow these instructions closely. Also, do not buy or wear compression stockings without first seeing your healthcare provider.  Tips for wear and care  To wear stockings safely and to get the most benefit:  Wear the length prescribed by your healthcare provider.  Pull them to the designated height and no farther. Dont let them bunch at the top. This can restrict blood flow and increase swelling.  Wear the stockings for the amount of time your healthcare provider recommends. Replace them when they start to feel loose. This will likely be every 3 to 6 months.  Remove them as your healthcare provider directs. When removed, wash your legs. Then check your legs and feet for sores. Call your healthcare provider if you find a sore. Dont put the stockings back on unless your healthcare provider directs.   Wash the stockings as instructed. They may need to be hand-washed.  Date Last Reviewed: 5/1/2016  © 0278-9960 [employer]. [address] [place] [address], [place] [address]. All rights reserved.  This information is not intended as a substitute for professional medical care. Always follow your healthcare professional's instructions.        Tips for Using Less Salt    Most people with heart problems need to eat less salt (sodium). Reducing the amount of salt you eat may help control your blood pressure. The higher your blood pressure, the greater your risk for heart disease, stroke, blindness, and kidney problems.  At the store  Make low-salt choices by reading labels carefully. Look for the total amount of sodium per serving.  Use more fresh food. Buy more fruits and vegetables. Select lean meats, fish, and poultry.  Use fewer frozen, canned, and packaged foods which often contain a lot of sodium.  Use plain frozen vegetables without sauces or toppings. These products are often low- or no-sodium.  Opt for reduced-sodium or no-salt-added versions of canned vegetables and soups.  In the kitchen  Don't add salt to food when you're cooking. Season with flavorings such as onion, garlic, pepper, salt-free herbal blends, and lemon or lime juice.  Use a cookbook containing low-salt recipes. It can give you ideas for tasty meals that are healthy for your heart.  Sprinkle salt-free herbal blends on vegetables and meat.  Drain and rinse canned foods, such as canned beans and vegetables, before cooking or eating.  Eating out  Tell the  you're on a low-salt diet. Ask questions about the menu.  Order fish, chicken, and meat broiled, baked, poached, or grilled without salt, butter, or breading.  Use lemon, pepper, and salt-free herb mixes to add flavor.  Choose plain steamed rice, boiled noodles, and baked or boiled potatoes. Top potatoes with chives and a little sour cream.     Beware! Salt goes by many other names. Limit foods with these words listed as ingredients: salt, sodium, soy sauce, baking soda, baking powder, MSG, monosodium, Na (the chemical symbol for sodium). Some antacids are also high in salt.   Date  Last Reviewed: 6/19/2015  © 9612-6000 [employer]. [address] [place] [address], [place] [address]. All rights reserved. This information is not intended as a substitute for professional medical care. Always follow your healthcare professional's instructions.        Low-Salt Diet  This diet removes foods that are high in salt. It also limits the amount of salt you use when cooking. It is most often used for people with high blood pressure, edema (fluid retention), and kidney, liver, or heart disease.  Table salt contains the mineral sodium. Your body needs sodium to work normally. But too much sodium can make your health problems worse. Your healthcare provider is recommending a low-salt (also called low-sodium) diet for you. Your total daily allowance of salt is 1,500 to 2,300 milligrams (mg). It is less than 1 teaspoon of table salt. This means you can have only about 500 to 700 mg of sodium at each meal. People with certain health problems should limit salt intake to the lower end of the recommended range.    When you cook, dont add much salt. If you can cook without using salt, even better. Dont add salt to your food at the table.  When shopping, read food labels. Salt is often called sodium on the label. Choose foods that are salt-free, low salt, or very low salt. Note that foods with reduced salt may not lower your salt intake enough.    Beans, potatoes, and pasta  Ok: Dry beans, split peas, lentils, potatoes, rice, macaroni, pasta, spaghetti without added salt  Avoid: Potato chips, tortilla chips, and similar products  Breads and cereals  Ok: Low-sodium breads, rolls, cereals, and cakes; low-salt crackers, matzo crackers  Avoid: Salted crackers, pretzels, popcorn, Portuguese toast, pancakes, muffins  Dairy  Ok: Milk, chocolate milk, hot chocolate mix, low-salt cheeses, and yogurt  Avoid: Processed cheese and cheese spreads; Roquefort, Camembert, and cottage cheese; buttermilk, instant breakfast  drink  Desserts  Ok: Ice cream, frozen yogurt, juice bars, gelatin, cookies and pies, sugar, honey, jelly, hard candy  Avoid: Most pies, cakes and cookies prepared or processed with salt; instant pudding  Drinks  Ok: Tea, coffee, fizzy (carbonated) drinks, juices  Avoid: Flavored coffees, electrolyte replacement drinks, sports drinks  Meats  Ok: All fresh meat, fish, poultry, low-salt tuna, eggs, egg substitute  Avoid: Smoked, pickled, brine-cured, or salted meats and fish. This includes eli, chipped beef, corned beef, hot dogs, deli meats, ham, kosher meats, salt pork, sausage, canned tuna, salted codfish, smoked salmon, herring, sardines, or anchovies.  Seasonings and spices  Ok: Most seasonings are okay. Good substitutes for salt include: fresh herb blends, hot sauce, lemon, garlic, velazquez, vinegar, dry mustard, parsley, cilantro, horseradish, tomato paste, regular margarine, mayonnaise, unsalted butter, cream cheese, vegetable oil, cream, low-salt salad dressing and gravy.  Avoid: Regular ketchup, relishes, pickles, soy sauce, teriyaki sauce, Worcestershire sauce, BBQ sauce, tartar sauce, meat tenderizer, chili sauce, regular gravy, regular salad dressing, salted butter  Soups  Ok: Low-salt soups and broths made with allowed foods  Avoid: Bouillon cubes, soups with smoked or salted meats, regular soup and broth  Vegetables  Ok: Most vegetables are okay; also low-salt tomato and vegetable juices  Avoid: Sauerkraut and other brine-soaked vegetables; pickles and other pickled vegetables; tomato juice, olives  Date Last Reviewed: 8/1/2016  © 2286-3841 [employer]. [address] [place] [address], [place] [address]. All rights reserved. This information is not intended as a substitute for professional medical care. Always follow your healthcare professional's instructions.        Low-Salt Choices  Eating salt (sodium) can make your body retain too much water. Excess water makes your heart work harder. Canned,  packaged, and frozen foods are easy to prepare, but they are often high in sodium. Here are some ideas for low-salt foods you can easily prepare yourself.    For breakfast  Fruit or 100% fruit juice  Whole-wheat bread or an English muffin. Compare sodium content on labels.  Low-fat milk or yogurt  Unsalted eggs  Shredded wheat  Corn tortillas  Unsalted steamed rice  Regular (not instant) hot cereal, made without salt  Stay away from:  Sausage, eli, and ham  Flour tortillas  Packaged muffins, pancakes, and biscuits  Instant hot cereals  Cottage cheese  For lunch and dinner  Fresh fish, chicken, turkey, or meat--baked, broiled, or roasted without salt  Dry beans, cooked without salt  Tofu, stir-fried without salt  Unsalted fresh fruit and vegetables, or frozen or canned fruit and vegetables with no added salt  Stay away from:  Lunch or deli meat that is cured or smoked  Cheese  Tomato juice and catsup  Canned vegetables, soups, and fish not labeled as no-salt-added or reduced sodium  Packaged gravies and sauces  Olives, pickles, and relish  Bottled salad dressings  For snacks and desserts  Yogurt  Unsalted, air popped popcorn  Unsalted nuts or seeds  Stay away from:  Pies and cakes  Packaged dessert mixes  Pizza  Canned and packaged puddings  Pretzels, chips, crackers, and nuts--unless the label says unsalted  Date Last Reviewed: 6/17/2015  © 1115-0525 [employer]. [address] [place] [address], [place] [address]. All rights reserved. This information is not intended as a substitute for professional medical care. Always follow your healthcare professional's instructions.

## 2022-07-05 DIAGNOSIS — I80.01 PHLEBITIS AND THOMBOPHLB OF SUPERFIC VESSELS OF R LOW EXTREM: ICD-10-CM

## 2022-07-05 DIAGNOSIS — I78.1 SPIDER VEIN, SYMPTOMATIC: Primary | ICD-10-CM

## 2022-10-21 DIAGNOSIS — F41.9 ANXIETY DUE TO INVASIVE PROCEDURE: Primary | ICD-10-CM

## 2022-10-24 ENCOUNTER — HOSPITAL ENCOUNTER (OUTPATIENT)
Dept: RADIOLOGY | Facility: HOSPITAL | Age: 62
Discharge: HOME | End: 2022-10-24
Attending: FAMILY MEDICINE
Payer: COMMERCIAL

## 2022-10-24 VITALS — BODY MASS INDEX: 29 KG/M2 | HEIGHT: 67 IN | WEIGHT: 184.75 LBS

## 2022-10-24 DIAGNOSIS — Z12.31 ENCOUNTER FOR SCREENING MAMMOGRAM FOR BREAST CANCER: ICD-10-CM

## 2022-10-24 PROCEDURE — 77067 SCR MAMMO BI INCL CAD: CPT | Mod: 26,,, | Performed by: RADIOLOGY

## 2022-10-24 PROCEDURE — 77063 MAMMO DIGITAL SCREENING BILAT WITH TOMO: ICD-10-PCS | Mod: 26,,, | Performed by: RADIOLOGY

## 2022-10-24 PROCEDURE — 77063 BREAST TOMOSYNTHESIS BI: CPT | Mod: TC

## 2022-10-24 PROCEDURE — 77063 BREAST TOMOSYNTHESIS BI: CPT | Mod: 26,,, | Performed by: RADIOLOGY

## 2022-10-24 PROCEDURE — 77067 SCR MAMMO BI INCL CAD: CPT | Mod: TC

## 2022-10-24 PROCEDURE — 77067 MAMMO DIGITAL SCREENING BILAT WITH TOMO: ICD-10-PCS | Mod: 26,,, | Performed by: RADIOLOGY

## 2022-10-24 RX ORDER — ALPRAZOLAM 0.5 MG/1
TABLET ORAL
Qty: 2 TABLET | Refills: 0 | Status: SHIPPED | OUTPATIENT
Start: 2022-10-24 | End: 2023-01-03

## 2022-10-26 ENCOUNTER — TELEPHONE (OUTPATIENT)
Dept: FAMILY MEDICINE | Facility: CLINIC | Age: 62
End: 2022-10-26
Payer: COMMERCIAL

## 2022-10-26 NOTE — TELEPHONE ENCOUNTER
Called patient to notify her that mammogram came back normal. Patient verbalized understanding. Patient also asked to schedule her blood work while I had her on the phone since she knew it was due in October. Scheduled patient for Monday 10/31 at 8:45AM for labs.

## 2022-10-28 ENCOUNTER — PROCEDURE VISIT (OUTPATIENT)
Dept: VASCULAR SURGERY | Facility: CLINIC | Age: 62
End: 2022-10-28
Payer: COMMERCIAL

## 2022-10-28 DIAGNOSIS — I78.1 SPIDER VEIN, SYMPTOMATIC: ICD-10-CM

## 2022-10-28 DIAGNOSIS — I80.01 PHLEBITIS AND THOMBOPHLB OF SUPERFIC VESSELS OF R LOW EXTREM: ICD-10-CM

## 2022-10-28 PROCEDURE — 36471 PR INJECTION THERAPY VEIN,MULT VEINS: ICD-10-PCS | Mod: RT,S$GLB,, | Performed by: SURGERY

## 2022-10-28 PROCEDURE — 36471 NJX SCLRSNT MLT INCMPTNT VN: CPT | Mod: RT,S$GLB,, | Performed by: SURGERY

## 2022-10-31 ENCOUNTER — LAB VISIT (OUTPATIENT)
Dept: LAB | Facility: HOSPITAL | Age: 62
End: 2022-10-31
Attending: FAMILY MEDICINE
Payer: COMMERCIAL

## 2022-10-31 DIAGNOSIS — Z00.00 GENERAL MEDICAL EXAM: ICD-10-CM

## 2022-10-31 LAB
ALBUMIN SERPL BCP-MCNC: 4.1 G/DL (ref 3.5–5.2)
ALP SERPL-CCNC: 71 U/L (ref 55–135)
ALT SERPL W/O P-5'-P-CCNC: 15 U/L (ref 10–44)
ANION GAP SERPL CALC-SCNC: 10 MMOL/L (ref 8–16)
AST SERPL-CCNC: 17 U/L (ref 10–40)
BASOPHILS # BLD AUTO: 0.04 K/UL (ref 0–0.2)
BASOPHILS NFR BLD: 0.9 % (ref 0–1.9)
BILIRUB SERPL-MCNC: 0.5 MG/DL (ref 0.1–1)
BUN SERPL-MCNC: 14 MG/DL (ref 8–23)
CALCIUM SERPL-MCNC: 10 MG/DL (ref 8.7–10.5)
CHLORIDE SERPL-SCNC: 106 MMOL/L (ref 95–110)
CHOLEST SERPL-MCNC: 206 MG/DL (ref 120–199)
CHOLEST/HDLC SERPL: 2.7 {RATIO} (ref 2–5)
CO2 SERPL-SCNC: 25 MMOL/L (ref 23–29)
CREAT SERPL-MCNC: 0.7 MG/DL (ref 0.5–1.4)
DIFFERENTIAL METHOD: NORMAL
EOSINOPHIL # BLD AUTO: 0.1 K/UL (ref 0–0.5)
EOSINOPHIL NFR BLD: 3.1 % (ref 0–8)
ERYTHROCYTE [DISTWIDTH] IN BLOOD BY AUTOMATED COUNT: 12.6 % (ref 11.5–14.5)
EST. GFR  (NO RACE VARIABLE): >60 ML/MIN/1.73 M^2
ESTIMATED AVG GLUCOSE: 114 MG/DL (ref 68–131)
GLUCOSE SERPL-MCNC: 88 MG/DL (ref 70–110)
HBA1C MFR BLD: 5.6 % (ref 4–5.6)
HCT VFR BLD AUTO: 42.8 % (ref 37–48.5)
HDLC SERPL-MCNC: 77 MG/DL (ref 40–75)
HDLC SERPL: 37.4 % (ref 20–50)
HGB BLD-MCNC: 13.8 G/DL (ref 12–16)
IMM GRANULOCYTES # BLD AUTO: 0 K/UL (ref 0–0.04)
IMM GRANULOCYTES NFR BLD AUTO: 0 % (ref 0–0.5)
LDLC SERPL CALC-MCNC: 115.2 MG/DL (ref 63–159)
LYMPHOCYTES # BLD AUTO: 1.8 K/UL (ref 1–4.8)
LYMPHOCYTES NFR BLD: 43.3 % (ref 18–48)
MCH RBC QN AUTO: 27.3 PG (ref 27–31)
MCHC RBC AUTO-ENTMCNC: 32.2 G/DL (ref 32–36)
MCV RBC AUTO: 85 FL (ref 82–98)
MONOCYTES # BLD AUTO: 0.3 K/UL (ref 0.3–1)
MONOCYTES NFR BLD: 7.1 % (ref 4–15)
NEUTROPHILS # BLD AUTO: 1.9 K/UL (ref 1.8–7.7)
NEUTROPHILS NFR BLD: 45.6 % (ref 38–73)
NONHDLC SERPL-MCNC: 129 MG/DL
NRBC BLD-RTO: 0 /100 WBC
PLATELET # BLD AUTO: 226 K/UL (ref 150–450)
PMV BLD AUTO: 10.3 FL (ref 9.2–12.9)
POTASSIUM SERPL-SCNC: 5.2 MMOL/L (ref 3.5–5.1)
PROT SERPL-MCNC: 7.4 G/DL (ref 6–8.4)
RBC # BLD AUTO: 5.06 M/UL (ref 4–5.4)
SODIUM SERPL-SCNC: 141 MMOL/L (ref 136–145)
TRIGL SERPL-MCNC: 69 MG/DL (ref 30–150)
TSH SERPL DL<=0.005 MIU/L-ACNC: 1.28 UIU/ML (ref 0.4–4)
WBC # BLD AUTO: 4.25 K/UL (ref 3.9–12.7)

## 2022-10-31 PROCEDURE — 84443 ASSAY THYROID STIM HORMONE: CPT | Performed by: FAMILY MEDICINE

## 2022-10-31 PROCEDURE — 83036 HEMOGLOBIN GLYCOSYLATED A1C: CPT | Performed by: FAMILY MEDICINE

## 2022-10-31 PROCEDURE — 85025 COMPLETE CBC W/AUTO DIFF WBC: CPT | Performed by: FAMILY MEDICINE

## 2022-10-31 PROCEDURE — 80053 COMPREHEN METABOLIC PANEL: CPT | Performed by: FAMILY MEDICINE

## 2022-10-31 PROCEDURE — 36415 COLL VENOUS BLD VENIPUNCTURE: CPT | Mod: PO | Performed by: FAMILY MEDICINE

## 2022-10-31 PROCEDURE — 80061 LIPID PANEL: CPT | Performed by: FAMILY MEDICINE

## 2022-10-31 NOTE — PROCEDURES
10/31/2022      Pre-Procedure Diagnosis:   1. Right lower extremity spider veins  2. Right lower extremity reticular veins    Post-Procedure Diagnsosis: Same    Procedure: Right lower extremity sclerotherapy with Asclera 2%    Surgeon: MD BARB Denton    Anesthesia: Asclera     Estimated blood loss: <5cc    Complications: None       Time out performed and patient was prepped and draped in sterile fashion.  Vein light used to identify reticular veins feeding right lower extremity spider veins of the thigh and calf.  Skin overlying affected areas cleaned with alcohol.  Reticular veins were then injected with 0.5% Asclera foam sclerosant created with Tessari method with resolution of reticular veins.  Next the spider veins were identified and the overlying skin was cleaned with alcohol.  The veins were accessed with a 30 gauge needle and injected with 0.25% Asclera liquid sclerosant with near resolution of all treated areas.  Once all areas of concern to the patient were treated, sterile cotton balls and Tegaderms were applied for light compression as well as ice for 5 minutes.  We then placed a Coban wrap over the treated areas followed by compression stocking.  The patient ambulated after the procedure without issues.      MD BARB Denton  [facility]

## 2023-01-03 ENCOUNTER — LAB VISIT (OUTPATIENT)
Dept: LAB | Facility: HOSPITAL | Age: 63
End: 2023-01-03
Attending: FAMILY MEDICINE
Payer: COMMERCIAL

## 2023-01-03 ENCOUNTER — OFFICE VISIT (OUTPATIENT)
Dept: FAMILY MEDICINE | Facility: CLINIC | Age: 63
End: 2023-01-03
Payer: COMMERCIAL

## 2023-01-03 VITALS
HEART RATE: 68 BPM | WEIGHT: 179.88 LBS | SYSTOLIC BLOOD PRESSURE: 118 MMHG | OXYGEN SATURATION: 98 % | BODY MASS INDEX: 28.23 KG/M2 | TEMPERATURE: 98 F | HEIGHT: 67 IN | DIASTOLIC BLOOD PRESSURE: 70 MMHG

## 2023-01-03 DIAGNOSIS — M62.838 MUSCLE SPASM: Primary | ICD-10-CM

## 2023-01-03 DIAGNOSIS — R10.9 FLANK PAIN: ICD-10-CM

## 2023-01-03 LAB
BILIRUB UR QL STRIP: NEGATIVE
CLARITY UR REFRACT.AUTO: CLEAR
COLOR UR AUTO: COLORLESS
GLUCOSE UR QL STRIP: NEGATIVE
HGB UR QL STRIP: NEGATIVE
KETONES UR QL STRIP: NEGATIVE
LEUKOCYTE ESTERASE UR QL STRIP: NEGATIVE
NITRITE UR QL STRIP: NEGATIVE
PH UR STRIP: 7 [PH] (ref 5–8)
PROT UR QL STRIP: NEGATIVE
SP GR UR STRIP: 1 (ref 1–1.03)
URN SPEC COLLECT METH UR: ABNORMAL

## 2023-01-03 PROCEDURE — 99214 PR OFFICE/OUTPT VISIT, EST, LEVL IV, 30-39 MIN: ICD-10-PCS | Mod: S$GLB,,, | Performed by: FAMILY MEDICINE

## 2023-01-03 PROCEDURE — 99214 OFFICE O/P EST MOD 30 MIN: CPT | Mod: S$GLB,,, | Performed by: FAMILY MEDICINE

## 2023-01-03 PROCEDURE — 99999 PR PBB SHADOW E&M-EST. PATIENT-LVL IV: CPT | Mod: PBBFAC,,, | Performed by: FAMILY MEDICINE

## 2023-01-03 PROCEDURE — 1159F MED LIST DOCD IN RCRD: CPT | Mod: CPTII,S$GLB,, | Performed by: FAMILY MEDICINE

## 2023-01-03 PROCEDURE — 87086 URINE CULTURE/COLONY COUNT: CPT | Performed by: FAMILY MEDICINE

## 2023-01-03 PROCEDURE — 3074F SYST BP LT 130 MM HG: CPT | Mod: CPTII,S$GLB,, | Performed by: FAMILY MEDICINE

## 2023-01-03 PROCEDURE — 1159F PR MEDICATION LIST DOCUMENTED IN MEDICAL RECORD: ICD-10-PCS | Mod: CPTII,S$GLB,, | Performed by: FAMILY MEDICINE

## 2023-01-03 PROCEDURE — 99999 PR PBB SHADOW E&M-EST. PATIENT-LVL IV: ICD-10-PCS | Mod: PBBFAC,,, | Performed by: FAMILY MEDICINE

## 2023-01-03 PROCEDURE — 3078F PR MOST RECENT DIASTOLIC BLOOD PRESSURE < 80 MM HG: ICD-10-PCS | Mod: CPTII,S$GLB,, | Performed by: FAMILY MEDICINE

## 2023-01-03 PROCEDURE — 81003 URINALYSIS AUTO W/O SCOPE: CPT | Performed by: FAMILY MEDICINE

## 2023-01-03 PROCEDURE — 1160F PR REVIEW ALL MEDS BY PRESCRIBER/CLIN PHARMACIST DOCUMENTED: ICD-10-PCS | Mod: CPTII,S$GLB,, | Performed by: FAMILY MEDICINE

## 2023-01-03 PROCEDURE — 1160F RVW MEDS BY RX/DR IN RCRD: CPT | Mod: CPTII,S$GLB,, | Performed by: FAMILY MEDICINE

## 2023-01-03 PROCEDURE — 3074F PR MOST RECENT SYSTOLIC BLOOD PRESSURE < 130 MM HG: ICD-10-PCS | Mod: CPTII,S$GLB,, | Performed by: FAMILY MEDICINE

## 2023-01-03 PROCEDURE — 3078F DIAST BP <80 MM HG: CPT | Mod: CPTII,S$GLB,, | Performed by: FAMILY MEDICINE

## 2023-01-03 PROCEDURE — 3008F PR BODY MASS INDEX (BMI) DOCUMENTED: ICD-10-PCS | Mod: CPTII,S$GLB,, | Performed by: FAMILY MEDICINE

## 2023-01-03 PROCEDURE — 3008F BODY MASS INDEX DOCD: CPT | Mod: CPTII,S$GLB,, | Performed by: FAMILY MEDICINE

## 2023-01-03 RX ORDER — TIZANIDINE 4 MG/1
4 TABLET ORAL EVERY 8 HOURS PRN
Qty: 60 TABLET | Refills: 0 | Status: SHIPPED | OUTPATIENT
Start: 2023-01-03 | End: 2023-01-13

## 2023-01-03 NOTE — PROGRESS NOTES
"Routine Office Visit    Cloie Bodden Reyes  1960  [id number]      Subjective     Ciro is a 62 y.o. female who presents today for:    Left sided pain - started 2 months ago - no trauma or injuries. No changes in bowel movement or urination. Pain is described as a sharp stabbing pain that is intermittent. No known triggers. She has had history of muscle spasms in the past.     Objective     Review of Systems   Constitutional:  Negative for chills and fever.   HENT:  Negative for congestion.    Eyes:  Negative for blurred vision.   Respiratory:  Negative for cough.    Cardiovascular:  Negative for chest pain.   Gastrointestinal:  Negative for abdominal pain, constipation, diarrhea, heartburn, nausea and vomiting.   Genitourinary:  Negative for dysuria.   Musculoskeletal:  Negative for myalgias.   Skin:  Negative for itching and rash.   Neurological:  Negative for dizziness and headaches.   Psychiatric/Behavioral:  Negative for depression.      /70   Pulse 68   Temp 98.3 °F (36.8 °C) (Oral)   Ht 5' 7" (1.702 m)   Wt 81.6 kg (179 lb 14.3 oz)   SpO2 98%   BMI 28.18 kg/m²   Physical Exam  Constitutional:       Appearance: She is well-developed.   HENT:      Head: Normocephalic and atraumatic.   Eyes:      Conjunctiva/sclera: Conjunctivae normal.      Pupils: Pupils are equal, round, and reactive to light.   Cardiovascular:      Rate and Rhythm: Normal rate and regular rhythm.      Heart sounds: Normal heart sounds. No murmur heard.    No friction rub. No gallop.   Pulmonary:      Effort: No respiratory distress.      Breath sounds: Normal breath sounds.   Abdominal:      General: Bowel sounds are normal. There is no distension.      Palpations: Abdomen is soft.      Tenderness: There is no abdominal tenderness.   Musculoskeletal:         General: Normal range of motion.      Cervical back: Normal range of motion and neck supple.   Lymphadenopathy:      Cervical: No cervical adenopathy.   Skin:     General: " Skin is warm.   Neurological:      Mental Status: She is alert and oriented to person, place, and time.           Assessment     Health Maintenance         Date Due Completion Date    TETANUS VACCINE Never done ---    Shingles Vaccine (1 of 2) Never done ---    COVID-19 Vaccine (3 - Booster for Pfizer series) 06/30/2021 5/5/2021    Influenza Vaccine (1) 09/01/2022 11/16/2020 (Declined)    Override on 11/16/2020: Declined    Cervical Cancer Screening 05/11/2023 5/11/2018    Mammogram 10/24/2023 10/24/2022    Colorectal Cancer Screening 10/13/2024 10/13/2021    Lipid Panel 10/31/2027 10/31/2022              Problem List Items Addressed This Visit    None  Visit Diagnoses       Muscle spasm    -  Primary    Relevant Medications    tiZANidine (ZANAFLEX) 4 MG tablet  Common side effects of this medication were discussed with the patient. Questions regarding medications were discussed during this visit.    Stretch   Reviewed labs   Recommend increase fluids - Patient may have some dehydration       Flank pain        Relevant Orders    Urinalysis    Urine culture  F/u and treat as indicated                     No follow-ups on file.

## 2023-01-04 ENCOUNTER — TELEPHONE (OUTPATIENT)
Dept: FAMILY MEDICINE | Facility: CLINIC | Age: 63
End: 2023-01-04
Payer: COMMERCIAL

## 2023-01-04 LAB — BACTERIA UR CULT: NO GROWTH

## 2023-01-04 NOTE — TELEPHONE ENCOUNTER
----- Message from Vivi Stiles MD sent at 1/4/2023 10:55 AM CST -----  Preliminary Urine was normal

## 2023-01-05 ENCOUNTER — TELEPHONE (OUTPATIENT)
Dept: FAMILY MEDICINE | Facility: CLINIC | Age: 63
End: 2023-01-05
Payer: COMMERCIAL

## 2023-01-05 NOTE — TELEPHONE ENCOUNTER
----- Message from Vivi Stiles MD sent at 1/5/2023  9:33 AM CST -----  Urine culture was negative

## 2023-06-02 ENCOUNTER — OFFICE VISIT (OUTPATIENT)
Dept: OPHTHALMOLOGY | Facility: CLINIC | Age: 63
End: 2023-06-02
Payer: COMMERCIAL

## 2023-06-02 DIAGNOSIS — H25.13 NUCLEAR SCLEROSIS OF BOTH EYES: Primary | ICD-10-CM

## 2023-06-02 DIAGNOSIS — H43.813 VITREOUS DETACHMENT OF BOTH EYES: ICD-10-CM

## 2023-06-02 DIAGNOSIS — H52.7 REFRACTIVE ERROR: ICD-10-CM

## 2023-06-02 PROCEDURE — 92014 COMPRE OPH EXAM EST PT 1/>: CPT | Mod: S$GLB,,, | Performed by: OPHTHALMOLOGY

## 2023-06-02 PROCEDURE — 1159F MED LIST DOCD IN RCRD: CPT | Mod: CPTII,S$GLB,, | Performed by: OPHTHALMOLOGY

## 2023-06-02 PROCEDURE — 99999 PR PBB SHADOW E&M-EST. PATIENT-LVL II: ICD-10-PCS | Mod: PBBFAC,,, | Performed by: OPHTHALMOLOGY

## 2023-06-02 PROCEDURE — 1160F PR REVIEW ALL MEDS BY PRESCRIBER/CLIN PHARMACIST DOCUMENTED: ICD-10-PCS | Mod: CPTII,S$GLB,, | Performed by: OPHTHALMOLOGY

## 2023-06-02 PROCEDURE — 1160F RVW MEDS BY RX/DR IN RCRD: CPT | Mod: CPTII,S$GLB,, | Performed by: OPHTHALMOLOGY

## 2023-06-02 PROCEDURE — 1159F PR MEDICATION LIST DOCUMENTED IN MEDICAL RECORD: ICD-10-PCS | Mod: CPTII,S$GLB,, | Performed by: OPHTHALMOLOGY

## 2023-06-02 PROCEDURE — 99999 PR PBB SHADOW E&M-EST. PATIENT-LVL II: CPT | Mod: PBBFAC,,, | Performed by: OPHTHALMOLOGY

## 2023-06-02 PROCEDURE — 92014 PR EYE EXAM, EST PATIENT,COMPREHESV: ICD-10-PCS | Mod: S$GLB,,, | Performed by: OPHTHALMOLOGY

## 2023-06-03 NOTE — PROGRESS NOTES
Subjective:       Patient ID: Cloie Bodden Reyes is a 62 y.o. female.    Chief Complaint: Follow-up (Cloie Reyes is a 63 y/o female )    HPI     Follow-up     Additional comments: Cloie Reyes is a 63 y/o female            Comments    Pt here for 6 month DFE   Pt state floaters  OU  Pt state no other complaints at this time           Last edited by Any Donnelly on 6/2/2023  2:03 PM.             Assessment:       1. Nuclear sclerosis of both eyes    2. Vitreous detachment of both eyes    3. Refractive error        Plan:       Cataracts- Not visually significant.  PVD's OU-Stable.  RE-Pt does not want MRx.      RTC 1 yr.

## 2023-06-06 ENCOUNTER — OFFICE VISIT (OUTPATIENT)
Dept: FAMILY MEDICINE | Facility: CLINIC | Age: 63
End: 2023-06-06
Payer: COMMERCIAL

## 2023-06-06 ENCOUNTER — HOSPITAL ENCOUNTER (OUTPATIENT)
Dept: RADIOLOGY | Facility: HOSPITAL | Age: 63
Discharge: HOME | End: 2023-06-06
Attending: FAMILY MEDICINE
Payer: COMMERCIAL

## 2023-06-06 VITALS
BODY MASS INDEX: 27.57 KG/M2 | HEART RATE: 63 BPM | SYSTOLIC BLOOD PRESSURE: 100 MMHG | DIASTOLIC BLOOD PRESSURE: 70 MMHG | TEMPERATURE: 98 F | OXYGEN SATURATION: 98 % | WEIGHT: 175.69 LBS | HEIGHT: 67 IN

## 2023-06-06 DIAGNOSIS — R53.83 FATIGUE, UNSPECIFIED TYPE: Primary | ICD-10-CM

## 2023-06-06 DIAGNOSIS — R10.9 FLANK PAIN: ICD-10-CM

## 2023-06-06 DIAGNOSIS — R94.31 ABNORMAL EKG: ICD-10-CM

## 2023-06-06 DIAGNOSIS — R00.1 BRADYCARDIA: ICD-10-CM

## 2023-06-06 DIAGNOSIS — R53.83 FATIGUE, UNSPECIFIED TYPE: ICD-10-CM

## 2023-06-06 PROCEDURE — 99215 OFFICE O/P EST HI 40 MIN: CPT | Mod: S$GLB,,, | Performed by: FAMILY MEDICINE

## 2023-06-06 PROCEDURE — 72070 XR THORACIC SPINE AP LATERAL: ICD-10-PCS | Mod: 26,,, | Performed by: RADIOLOGY

## 2023-06-06 PROCEDURE — 99999 PR PBB SHADOW E&M-EST. PATIENT-LVL V: CPT | Mod: PBBFAC,,, | Performed by: FAMILY MEDICINE

## 2023-06-06 PROCEDURE — 99999 PR PBB SHADOW E&M-EST. PATIENT-LVL V: ICD-10-PCS | Mod: PBBFAC,,, | Performed by: FAMILY MEDICINE

## 2023-06-06 PROCEDURE — 3074F SYST BP LT 130 MM HG: CPT | Mod: CPTII,S$GLB,, | Performed by: FAMILY MEDICINE

## 2023-06-06 PROCEDURE — 1159F MED LIST DOCD IN RCRD: CPT | Mod: CPTII,S$GLB,, | Performed by: FAMILY MEDICINE

## 2023-06-06 PROCEDURE — 93005 EKG 12-LEAD: ICD-10-PCS | Mod: S$GLB,,, | Performed by: FAMILY MEDICINE

## 2023-06-06 PROCEDURE — 72070 X-RAY EXAM THORAC SPINE 2VWS: CPT | Mod: TC,FY,PO

## 2023-06-06 PROCEDURE — 71046 XR CHEST PA AND LATERAL: ICD-10-PCS | Mod: 26,,, | Performed by: RADIOLOGY

## 2023-06-06 PROCEDURE — 3008F PR BODY MASS INDEX (BMI) DOCUMENTED: ICD-10-PCS | Mod: CPTII,S$GLB,, | Performed by: FAMILY MEDICINE

## 2023-06-06 PROCEDURE — 1160F PR REVIEW ALL MEDS BY PRESCRIBER/CLIN PHARMACIST DOCUMENTED: ICD-10-PCS | Mod: CPTII,S$GLB,, | Performed by: FAMILY MEDICINE

## 2023-06-06 PROCEDURE — 3074F PR MOST RECENT SYSTOLIC BLOOD PRESSURE < 130 MM HG: ICD-10-PCS | Mod: CPTII,S$GLB,, | Performed by: FAMILY MEDICINE

## 2023-06-06 PROCEDURE — 1159F PR MEDICATION LIST DOCUMENTED IN MEDICAL RECORD: ICD-10-PCS | Mod: CPTII,S$GLB,, | Performed by: FAMILY MEDICINE

## 2023-06-06 PROCEDURE — 99215 PR OFFICE/OUTPT VISIT, EST, LEVL V, 40-54 MIN: ICD-10-PCS | Mod: S$GLB,,, | Performed by: FAMILY MEDICINE

## 2023-06-06 PROCEDURE — 3078F PR MOST RECENT DIASTOLIC BLOOD PRESSURE < 80 MM HG: ICD-10-PCS | Mod: CPTII,S$GLB,, | Performed by: FAMILY MEDICINE

## 2023-06-06 PROCEDURE — 3008F BODY MASS INDEX DOCD: CPT | Mod: CPTII,S$GLB,, | Performed by: FAMILY MEDICINE

## 2023-06-06 PROCEDURE — 3078F DIAST BP <80 MM HG: CPT | Mod: CPTII,S$GLB,, | Performed by: FAMILY MEDICINE

## 2023-06-06 PROCEDURE — 71046 X-RAY EXAM CHEST 2 VIEWS: CPT | Mod: 26,,, | Performed by: RADIOLOGY

## 2023-06-06 PROCEDURE — 1160F RVW MEDS BY RX/DR IN RCRD: CPT | Mod: CPTII,S$GLB,, | Performed by: FAMILY MEDICINE

## 2023-06-06 PROCEDURE — 93005 ELECTROCARDIOGRAM TRACING: CPT | Mod: S$GLB,,, | Performed by: FAMILY MEDICINE

## 2023-06-06 PROCEDURE — 93010 ELECTROCARDIOGRAM REPORT: CPT | Mod: S$GLB,,, | Performed by: INTERNAL MEDICINE

## 2023-06-06 PROCEDURE — 93010 EKG 12-LEAD: ICD-10-PCS | Mod: S$GLB,,, | Performed by: INTERNAL MEDICINE

## 2023-06-06 PROCEDURE — 71046 X-RAY EXAM CHEST 2 VIEWS: CPT | Mod: TC,FY,PO

## 2023-06-06 PROCEDURE — 72070 X-RAY EXAM THORAC SPINE 2VWS: CPT | Mod: 26,,, | Performed by: RADIOLOGY

## 2023-06-06 NOTE — PROGRESS NOTES
Assessment & Plan  Problem List Items Addressed This Visit    None  Visit Diagnoses       Fatigue, unspecified type    -  Primary    Relevant Orders    Comprehensive Metabolic Panel    CBC Auto Differential    Hemoglobin A1C    TSH    Urinalysis    Urine culture    X-Ray Chest PA And Lateral    IN OFFICE EKG 12-LEAD (to [place])    Flank pain        Relevant Orders    X-Ray Thoracic Spine AP Lateral    Bradycardia        Relevant Orders    Ambulatory referral/consult to Cardiology    Abnormal EKG        Relevant Orders    Ambulatory referral/consult to Cardiology          Abnormal ekg noted.  Will need to meet with cardiology     Health Maintenance reviewed.    Follow-up: No follow-ups on file.    ______________________________________________________________________    Chief Complaint  Chief Complaint   Patient presents with    Back Pain     Left side, couple of weeks     Fatigue       HPI  Cloie Bodden Reyes is a 62 y.o. female with multiple medical diagnoses as listed in the medical history and problem list that presents for back pain.  Pt is new to me.   She reports increased back pain that she has had for the last few weeks.  She describes a nagging pain.  It waxes and wanes.  It is located on the left side.  She has used voltaren with some improvement.  She has soreness.  She reports weakness that she has had for the past 2 weeks.  She thought she was going to die.  She did not go to the ER when she got the sensation.  I asked why she did not she indicated that it did not feel bad enough to go to the ER. She is concerned about something in her blood.  She reports good sleep.  Noted elevated potassium in the past. She denies a previous history of potassium issues.  No fever.  No chills. No NS.        PAST MEDICAL HISTORY:  Past Medical History:   Diagnosis Date    Allergy     Fibrocystic breast     History of cholecystectomy        PAST SURGICAL HISTORY:  Past Surgical History:   Procedure Laterality Date     BIOPSY  10/28/2020    BREAST BIOPSY Right 10/28/2020    Benign fibrotic breast tissue with fibroadenomatoid change and duct ectasia    CHOLECYSTECTOMY      HERNIA REPAIR      VAGINAL DELIVERY         SOCIAL HISTORY:  Social History     Socioeconomic History    Marital status:    Tobacco Use    Smoking status: Never    Smokeless tobacco: Never   Substance and Sexual Activity    Alcohol use: No     Alcohol/week: 0.0 standard drinks    Drug use: No    Sexual activity: Yes     Partners: Male       FAMILY HISTORY:  Family History   Problem Relation Age of Onset    Cancer Mother         kidney    Endometrial cancer Mother     Diabetes Father     Diabetes Sister     Stroke Brother     Clotting disorder Son     Diabetes Brother     No Known Problems Sister     No Known Problems Son     Endometrial cancer Maternal Grandmother     Amblyopia Neg Hx     Blindness Neg Hx     Cataracts Neg Hx     Glaucoma Neg Hx     Macular degeneration Neg Hx     Retinal detachment Neg Hx     Strabismus Neg Hx        ALLERGIES AND MEDICATIONS: updated and reviewed.  Review of patient's allergies indicates:   Allergen Reactions    Ibuprofen Other (See Comments)     Leg and hand     Current Outpatient Medications   Medication Sig Dispense Refill    acetaminophen (TYLENOL) 500 MG tablet Take 2 tablets every 8 hours by oral route as needed for 14 days.      aspirin (ECOTRIN) 81 MG EC tablet Take 81 mg by mouth once.       loratadine (CLARITIN) 10 mg tablet Take 1 tablet (10 mg total) by mouth daily as needed. 30 tablet 0    triamcinolone (NASACORT) 55 mcg nasal inhaler 2 sprays by Nasal route once daily.       Current Facility-Administered Medications   Medication Dose Route Frequency Provider Last Rate Last Admin    polidocanoL 1 % (20 mg/2 mL) injection Soln 0.2 mL  0.2 mL Intravenous 1 time in Clinic/HOD MD FRANTZ Arenas  Review of Systems   Constitutional:  Positive for activity change. Negative for appetite change,  "fatigue, fever and unexpected weight change.   HENT: Negative.  Negative for ear discharge, ear pain, rhinorrhea and sore throat.    Eyes: Negative.    Respiratory:  Negative for apnea, cough, chest tightness, shortness of breath and wheezing.    Cardiovascular:  Negative for chest pain, palpitations and leg swelling.   Gastrointestinal:  Negative for abdominal distention, abdominal pain, constipation, diarrhea and vomiting.   Endocrine: Negative for cold intolerance, heat intolerance, polydipsia and polyuria.   Genitourinary:  Positive for frequency (she does have issues with frequent urination). Negative for decreased urine volume and urgency.   Musculoskeletal: Negative.    Skin:  Negative for rash.   Neurological:  Positive for weakness. Negative for dizziness and headaches.   Hematological:  Does not bruise/bleed easily.   Psychiatric/Behavioral:  Negative for agitation, sleep disturbance and suicidal ideas.          Physical Exam  Vitals:    06/06/23 0820   BP: 100/70   Pulse: 63   Temp: 98.3 °F (36.8 °C)   TempSrc: Oral   SpO2: 98%   Weight: 79.7 kg (175 lb 11.3 oz)   Height: 5' 7" (1.702 m)    Body mass index is 27.52 kg/m².  Weight: 79.7 kg (175 lb 11.3 oz)   Height: 5' 7" (170.2 cm)   Physical Exam  Vitals reviewed.   Constitutional:       Appearance: Normal appearance. She is well-developed.   HENT:      Head: Normocephalic and atraumatic.      Right Ear: External ear normal.      Left Ear: External ear normal.      Nose: Nose normal.      Mouth/Throat:      Mouth: Mucous membranes are moist.      Pharynx: Oropharynx is clear.   Eyes:      Extraocular Movements: Extraocular movements intact.      Conjunctiva/sclera: Conjunctivae normal.      Pupils: Pupils are equal, round, and reactive to light.   Cardiovascular:      Rate and Rhythm: Normal rate and regular rhythm.      Heart sounds: Normal heart sounds.   Pulmonary:      Effort: Pulmonary effort is normal.      Breath sounds: Normal breath sounds. "   Skin:     General: Skin is warm and dry.   Neurological:      Mental Status: She is alert and oriented to person, place, and time.         Health Maintenance         Date Due Completion Date    TETANUS VACCINE Never done ---    Shingles Vaccine (1 of 2) Never done ---    COVID-19 Vaccine (3 - Pfizer series) 06/30/2021 5/5/2021    Cervical Cancer Screening 05/11/2023 5/11/2018    Influenza Vaccine (Season Ended) 09/01/2023 11/16/2020 (Declined)    Override on 11/16/2020: Declined    Mammogram 10/24/2023 10/24/2022    Colorectal Cancer Screening 10/13/2024 10/13/2021    Hemoglobin A1c (Diabetic Prevention Screening) 10/31/2025 10/31/2022    Lipid Panel 10/31/2027 10/31/2022                Patient note was created using [employer].  Any errors in syntax or even information may not have been identified and edited on initial review prior to signing this note.

## 2023-06-07 ENCOUNTER — TELEPHONE (OUTPATIENT)
Dept: FAMILY MEDICINE | Facility: CLINIC | Age: 63
End: 2023-06-07
Payer: COMMERCIAL

## 2023-06-07 ENCOUNTER — OFFICE VISIT (OUTPATIENT)
Dept: CARDIOLOGY | Facility: CLINIC | Age: 63
End: 2023-06-07
Payer: COMMERCIAL

## 2023-06-07 VITALS
BODY MASS INDEX: 27.82 KG/M2 | OXYGEN SATURATION: 97 % | DIASTOLIC BLOOD PRESSURE: 64 MMHG | SYSTOLIC BLOOD PRESSURE: 102 MMHG | WEIGHT: 177.25 LBS | RESPIRATION RATE: 15 BRPM | HEART RATE: 77 BPM | HEIGHT: 67 IN

## 2023-06-07 DIAGNOSIS — R00.1 BRADYCARDIA: ICD-10-CM

## 2023-06-07 DIAGNOSIS — R94.31 ABNORMAL EKG: ICD-10-CM

## 2023-06-07 DIAGNOSIS — R53.1 WEAKNESS: ICD-10-CM

## 2023-06-07 PROCEDURE — 99204 PR OFFICE/OUTPT VISIT, NEW, LEVL IV, 45-59 MIN: ICD-10-PCS | Mod: S$GLB,,, | Performed by: INTERNAL MEDICINE

## 2023-06-07 PROCEDURE — 3008F BODY MASS INDEX DOCD: CPT | Mod: CPTII,S$GLB,, | Performed by: INTERNAL MEDICINE

## 2023-06-07 PROCEDURE — 3008F PR BODY MASS INDEX (BMI) DOCUMENTED: ICD-10-PCS | Mod: CPTII,S$GLB,, | Performed by: INTERNAL MEDICINE

## 2023-06-07 PROCEDURE — 3074F PR MOST RECENT SYSTOLIC BLOOD PRESSURE < 130 MM HG: ICD-10-PCS | Mod: CPTII,S$GLB,, | Performed by: INTERNAL MEDICINE

## 2023-06-07 PROCEDURE — 1159F MED LIST DOCD IN RCRD: CPT | Mod: CPTII,S$GLB,, | Performed by: INTERNAL MEDICINE

## 2023-06-07 PROCEDURE — 3078F PR MOST RECENT DIASTOLIC BLOOD PRESSURE < 80 MM HG: ICD-10-PCS | Mod: CPTII,S$GLB,, | Performed by: INTERNAL MEDICINE

## 2023-06-07 PROCEDURE — 3078F DIAST BP <80 MM HG: CPT | Mod: CPTII,S$GLB,, | Performed by: INTERNAL MEDICINE

## 2023-06-07 PROCEDURE — 3044F PR MOST RECENT HEMOGLOBIN A1C LEVEL <7.0%: ICD-10-PCS | Mod: CPTII,S$GLB,, | Performed by: INTERNAL MEDICINE

## 2023-06-07 PROCEDURE — 1159F PR MEDICATION LIST DOCUMENTED IN MEDICAL RECORD: ICD-10-PCS | Mod: CPTII,S$GLB,, | Performed by: INTERNAL MEDICINE

## 2023-06-07 PROCEDURE — 99999 PR PBB SHADOW E&M-EST. PATIENT-LVL IV: ICD-10-PCS | Mod: PBBFAC,,, | Performed by: INTERNAL MEDICINE

## 2023-06-07 PROCEDURE — 99999 PR PBB SHADOW E&M-EST. PATIENT-LVL IV: CPT | Mod: PBBFAC,,, | Performed by: INTERNAL MEDICINE

## 2023-06-07 PROCEDURE — 99204 OFFICE O/P NEW MOD 45 MIN: CPT | Mod: S$GLB,,, | Performed by: INTERNAL MEDICINE

## 2023-06-07 PROCEDURE — 3074F SYST BP LT 130 MM HG: CPT | Mod: CPTII,S$GLB,, | Performed by: INTERNAL MEDICINE

## 2023-06-07 PROCEDURE — 3044F HG A1C LEVEL LT 7.0%: CPT | Mod: CPTII,S$GLB,, | Performed by: INTERNAL MEDICINE

## 2023-06-07 NOTE — PROGRESS NOTES
Subjective:   Patient ID:  Cloie Bodden Reyes is a 62 y.o. female who presents for evaluation of No chief complaint on file.      HPI    Referred by Dr Whitney  Cloie Bodden Reyes is a 62 y.o. female with multiple medical diagnoses as listed in the medical history and problem list that presents for back pain.  Pt is new to me.   She reports increased back pain that she has had for the last few weeks.  She describes a nagging pain.  It waxes and wanes.  It is located on the left side.  She has used voltaren with some improvement.  She has soreness.  She reports weakness that she has had for the past 2 weeks.  She thought she was going to die.  She did not go to the ER when she got the sensation.  I asked why she did not she indicated that it did not feel bad enough to go to the ER. She is concerned about something in her blood.  She reports good sleep.  Noted elevated potassium in the past. She denies a previous history of potassium issues.  No fever.  No chills. No NS.  EKG 6/6/23 sinus bradycardia 55 early repol  TSH 1.4 (6/6/23)    6/7/23 Recent episode with generalized weakness. Denies CP or SOB. Denies LOC    Review of Systems   Constitutional: Negative for decreased appetite.   HENT:  Negative for ear discharge.    Eyes:  Negative for blurred vision.   Respiratory:  Negative for hemoptysis.    Endocrine: Negative for polyphagia.   Hematologic/Lymphatic: Negative for adenopathy.   Skin:  Negative for color change.   Musculoskeletal:  Negative for joint swelling.   Genitourinary:  Negative for bladder incontinence.   Neurological:  Negative for brief paralysis.   Psychiatric/Behavioral:  Negative for hallucinations.    Allergic/Immunologic: Negative for hives.     Objective:   Physical Exam  Constitutional:       Appearance: She is well-developed.   HENT:      Head: Normocephalic and atraumatic.   Eyes:      Conjunctiva/sclera: Conjunctivae normal.      Pupils: Pupils are equal, round, and reactive to light.    Cardiovascular:      Rate and Rhythm: Normal rate.      Pulses: Intact distal pulses.      Heart sounds: Normal heart sounds.   Pulmonary:      Effort: Pulmonary effort is normal.      Breath sounds: Normal breath sounds.   Abdominal:      General: Bowel sounds are normal.      Palpations: Abdomen is soft.   Musculoskeletal:         General: Normal range of motion.      Cervical back: Normal range of motion and neck supple.   Skin:     General: Skin is warm and dry.   Neurological:      Mental Status: She is alert and oriented to person, place, and time.       Assessment:      1. Bradycardia    2. Abnormal EKG    3. Weakness        Plan:     Echo and holter for recent episode of weakness and bradycardia

## 2023-06-07 NOTE — TELEPHONE ENCOUNTER
----- Message from Ashanti Whitney MD sent at 6/7/2023  2:52 PM CDT -----  Please notify patient that all of her blood work is normal.

## 2023-06-08 ENCOUNTER — TELEPHONE (OUTPATIENT)
Dept: FAMILY MEDICINE | Facility: CLINIC | Age: 63
End: 2023-06-08
Payer: COMMERCIAL

## 2023-06-08 NOTE — TELEPHONE ENCOUNTER
----- Message from Ashanti Whitney MD sent at 6/8/2023  1:48 PM CDT -----  Please call patient and notify her that her xrays are normal.

## 2023-06-19 ENCOUNTER — OFFICE VISIT (OUTPATIENT)
Dept: FAMILY MEDICINE | Facility: CLINIC | Age: 63
End: 2023-06-19
Payer: COMMERCIAL

## 2023-06-19 VITALS
OXYGEN SATURATION: 98 % | BODY MASS INDEX: 27.3 KG/M2 | SYSTOLIC BLOOD PRESSURE: 110 MMHG | HEIGHT: 67 IN | HEART RATE: 67 BPM | TEMPERATURE: 98 F | DIASTOLIC BLOOD PRESSURE: 76 MMHG | WEIGHT: 173.94 LBS

## 2023-06-19 DIAGNOSIS — Z00.00 ANNUAL PHYSICAL EXAM: Primary | ICD-10-CM

## 2023-06-19 DIAGNOSIS — Z12.12 ENCOUNTER FOR COLORECTAL CANCER SCREENING: ICD-10-CM

## 2023-06-19 DIAGNOSIS — R53.83 FATIGUE, UNSPECIFIED TYPE: ICD-10-CM

## 2023-06-19 DIAGNOSIS — K59.00 CONSTIPATION, UNSPECIFIED CONSTIPATION TYPE: ICD-10-CM

## 2023-06-19 DIAGNOSIS — Z12.11 ENCOUNTER FOR COLORECTAL CANCER SCREENING: ICD-10-CM

## 2023-06-19 DIAGNOSIS — I83.90 VARICOSE VEINS OF LOWER EXTREMITY, UNSPECIFIED LATERALITY, UNSPECIFIED WHETHER COMPLICATED: ICD-10-CM

## 2023-06-19 PROCEDURE — 99396 PREV VISIT EST AGE 40-64: CPT | Mod: S$GLB,,, | Performed by: FAMILY MEDICINE

## 2023-06-19 PROCEDURE — 1160F PR REVIEW ALL MEDS BY PRESCRIBER/CLIN PHARMACIST DOCUMENTED: ICD-10-PCS | Mod: CPTII,S$GLB,, | Performed by: FAMILY MEDICINE

## 2023-06-19 PROCEDURE — 3078F DIAST BP <80 MM HG: CPT | Mod: CPTII,S$GLB,, | Performed by: FAMILY MEDICINE

## 2023-06-19 PROCEDURE — 3074F PR MOST RECENT SYSTOLIC BLOOD PRESSURE < 130 MM HG: ICD-10-PCS | Mod: CPTII,S$GLB,, | Performed by: FAMILY MEDICINE

## 2023-06-19 PROCEDURE — 1160F RVW MEDS BY RX/DR IN RCRD: CPT | Mod: CPTII,S$GLB,, | Performed by: FAMILY MEDICINE

## 2023-06-19 PROCEDURE — 99999 PR PBB SHADOW E&M-EST. PATIENT-LVL V: CPT | Mod: PBBFAC,,, | Performed by: FAMILY MEDICINE

## 2023-06-19 PROCEDURE — 99999 PR PBB SHADOW E&M-EST. PATIENT-LVL V: ICD-10-PCS | Mod: PBBFAC,,, | Performed by: FAMILY MEDICINE

## 2023-06-19 PROCEDURE — 3044F PR MOST RECENT HEMOGLOBIN A1C LEVEL <7.0%: ICD-10-PCS | Mod: CPTII,S$GLB,, | Performed by: FAMILY MEDICINE

## 2023-06-19 PROCEDURE — 3044F HG A1C LEVEL LT 7.0%: CPT | Mod: CPTII,S$GLB,, | Performed by: FAMILY MEDICINE

## 2023-06-19 PROCEDURE — 3078F PR MOST RECENT DIASTOLIC BLOOD PRESSURE < 80 MM HG: ICD-10-PCS | Mod: CPTII,S$GLB,, | Performed by: FAMILY MEDICINE

## 2023-06-19 PROCEDURE — 99396 PR PREVENTIVE VISIT,EST,40-64: ICD-10-PCS | Mod: S$GLB,,, | Performed by: FAMILY MEDICINE

## 2023-06-19 PROCEDURE — 3074F SYST BP LT 130 MM HG: CPT | Mod: CPTII,S$GLB,, | Performed by: FAMILY MEDICINE

## 2023-06-19 PROCEDURE — 1159F PR MEDICATION LIST DOCUMENTED IN MEDICAL RECORD: ICD-10-PCS | Mod: CPTII,S$GLB,, | Performed by: FAMILY MEDICINE

## 2023-06-19 PROCEDURE — 3008F BODY MASS INDEX DOCD: CPT | Mod: CPTII,S$GLB,, | Performed by: FAMILY MEDICINE

## 2023-06-19 PROCEDURE — 1159F MED LIST DOCD IN RCRD: CPT | Mod: CPTII,S$GLB,, | Performed by: FAMILY MEDICINE

## 2023-06-19 PROCEDURE — 3008F PR BODY MASS INDEX (BMI) DOCUMENTED: ICD-10-PCS | Mod: CPTII,S$GLB,, | Performed by: FAMILY MEDICINE

## 2023-06-19 NOTE — PROGRESS NOTES
"Routine Office Visit    Cloie Bodden Reyes  1960  [id number]      Subjective     Ciro is a 62 y.o. female who presents today for:    Annual physical   Atypical chest pain / discomfort - started a few weeks ago - Patient was evaluated by cardiology and has pending workup. She continues to feel fatigue and has intermittent episodes of sticking pain on the left side of chest.   Constipation - started approximately 3 weeks ago - Patient states she is usually regular, but for some reason she has not been able to use the bathroom. She drinks a lot of water and eats vegetables regularly. She was given cholestyramine by her sister and has taken 3 dosages without relief of symptoms. She tried ducolax but states that it caused severe cramping in her abdomen. She is still eating normally and has bowel movements but it is hard and does not feel complete.   Varicose veins - Patient has been following up with vascular. She continues to have pain especially with exertion. She was recommended swimming exercises, which she does when she goes back home to [place].       Objective     Review of Systems   Constitutional:  Negative for chills and fever.   HENT:  Negative for congestion.    Eyes:  Negative for blurred vision.   Respiratory:  Negative for cough.    Cardiovascular:  Negative for chest pain.   Gastrointestinal:  Negative for abdominal pain, constipation, diarrhea, heartburn, nausea and vomiting.   Genitourinary:  Negative for dysuria.   Musculoskeletal:  Negative for myalgias.   Skin:  Negative for itching and rash.   Neurological:  Negative for dizziness and headaches.   Psychiatric/Behavioral:  Negative for depression.      /76   Pulse 67   Temp 98.3 °F (36.8 °C) (Oral)   Ht 5' 7" (1.702 m)   Wt 78.9 kg (173 lb 15.1 oz)   SpO2 98%   BMI 27.24 kg/m²   Physical Exam  Constitutional:       Appearance: She is well-developed.   HENT:      Head: Normocephalic and atraumatic.   Eyes:      Conjunctiva/sclera: " Conjunctivae normal.      Pupils: Pupils are equal, round, and reactive to light.   Cardiovascular:      Rate and Rhythm: Normal rate and regular rhythm.      Heart sounds: Normal heart sounds. No murmur heard.    No friction rub. No gallop.   Pulmonary:      Effort: No respiratory distress.      Breath sounds: Normal breath sounds.   Abdominal:      General: Bowel sounds are normal. There is no distension.      Palpations: Abdomen is soft.      Tenderness: There is no abdominal tenderness.   Musculoskeletal:         General: Normal range of motion.      Cervical back: Normal range of motion and neck supple.   Lymphadenopathy:      Cervical: No cervical adenopathy.   Skin:     General: Skin is warm.   Neurological:      Mental Status: She is alert and oriented to person, place, and time.           Assessment     Health Maintenance         Date Due Completion Date    TETANUS VACCINE Never done ---    Shingles Vaccine (1 of 2) Never done ---    COVID-19 Vaccine (3 - Pfizer series) 06/30/2021 5/5/2021    Cervical Cancer Screening 05/11/2023 5/11/2018    Influenza Vaccine (Season Ended) 09/01/2023 11/16/2020 (Declined)    Override on 11/16/2020: Declined    Mammogram 10/24/2023 10/24/2022    Colorectal Cancer Screening 10/13/2024 10/13/2021    Hemoglobin A1c (Diabetic Prevention Screening) 06/06/2026 6/6/2023    Lipid Panel 10/31/2027 10/31/2022              Problem List Items Addressed This Visit    None  Visit Diagnoses       Annual physical exam    -  Primary    Relevant Orders    Ambulatory referral/consult to Gynecology    CBC Auto Differential    Comprehensive Metabolic Panel    Lipid Panel    Hemoglobin A1C    TSH  I addressed all major concerns as it related to health maintenance.  All were ordered and scheduled based on the patients wishes.  Any additional health maintenance will be readdressed at the next physical if declined or deferred by the patient.       Encounter for colorectal cancer screening         Relevant Orders    Ambulatory referral/consult to Endo Procedure     Constipation, unspecified constipation type        Relevant Orders    Ambulatory referral/consult to Endo Procedure   Recommend miralax  Stop cholestyramine - can cause constipation       Fatigue, unspecified type      Continue f/u with cardiology       Varicose veins of lower extremity, unspecified laterality, unspecified whether complicated      Noted  Continue f/u with vascular surgery                     Follow up in about 1 year (around 6/19/2024), or if symptoms worsen or fail to improve, for yearly exam.

## 2023-06-20 ENCOUNTER — PATIENT OUTREACH (OUTPATIENT)
Dept: ADMINISTRATIVE | Facility: HOSPITAL | Age: 63
End: 2023-06-20
Payer: COMMERCIAL

## 2023-06-20 NOTE — PROGRESS NOTES
Health Maintenance and immunizations reviewed/ updated.  Colonoscopy referral put in/ patient aware.   LVM REGARDING SCHEDULING COLONOSCOPY SCHEDULING CALL.  Health Maintenance Due   Topic Date Due    TETANUS VACCINE  Never done    Shingles Vaccine (1 of 2) Never done    COVID-19 Vaccine (3 - Pfizer series) 06/30/2021    Cervical Cancer Screening  05/11/2023

## 2023-06-22 ENCOUNTER — HOSPITAL ENCOUNTER (OUTPATIENT)
Dept: CARDIOLOGY | Facility: HOSPITAL | Age: 63
Discharge: HOME | End: 2023-06-22
Attending: INTERNAL MEDICINE
Payer: COMMERCIAL

## 2023-06-22 VITALS — BODY MASS INDEX: 27.15 KG/M2 | WEIGHT: 173 LBS | HEIGHT: 67 IN

## 2023-06-22 DIAGNOSIS — R94.31 ABNORMAL EKG: ICD-10-CM

## 2023-06-22 DIAGNOSIS — R53.1 WEAKNESS: ICD-10-CM

## 2023-06-22 DIAGNOSIS — R00.1 BRADYCARDIA: ICD-10-CM

## 2023-06-22 LAB
ASCENDING AORTA: 3.26 CM
AV MEAN GRADIENT: 3 MMHG
AV PEAK GRADIENT: 5 MMHG
AV REGURGITATION PRESSURE HALF TIME: 790.09 MS
BSA FOR ECHO PROCEDURE: 1.93 M2
CV ECHO LV RWT: 0.43 CM
DOP CALC AO PEAK VEL: 1.12 M/S
DOP CALC AO VTI: 23.1 CM
DOP CALC LVOT AREA: 3.1 CM2
DOP CALC LVOT DIAMETER: 1.99 CM
DOP CALC MV VTI: 30.5 CM
E WAVE DECELERATION TIME: 196.21 MSEC
E/A RATIO: 0.71
E/E' RATIO: 9.29 M/S
ECHO LV POSTERIOR WALL: 0.97 CM (ref 0.6–1.1)
EJECTION FRACTION: 55 %
FRACTIONAL SHORTENING: 28 % (ref 28–44)
INTERVENTRICULAR SEPTUM: 0.98 CM (ref 0.6–1.1)
IVC DIAMETER: 1.36 CM
LA MAJOR: 4.36 CM
LA MINOR: 4.7 CM
LA WIDTH: 4.3 CM
LEFT ATRIUM SIZE: 3.17 CM
LEFT ATRIUM VOLUME INDEX: 27.6 ML/M2
LEFT ATRIUM VOLUME: 52.41 CM3
LEFT INTERNAL DIMENSION IN SYSTOLE: 3.19 CM (ref 2.1–4)
LEFT VENTRICLE DIASTOLIC VOLUME INDEX: 47.54 ML/M2
LEFT VENTRICLE DIASTOLIC VOLUME: 90.33 ML
LEFT VENTRICLE MASS INDEX: 77 G/M2
LEFT VENTRICLE SYSTOLIC VOLUME INDEX: 21.5 ML/M2
LEFT VENTRICLE SYSTOLIC VOLUME: 40.8 ML
LEFT VENTRICULAR INTERNAL DIMENSION IN DIASTOLE: 4.46 CM (ref 3.5–6)
LEFT VENTRICULAR MASS: 145.92 G
LV LATERAL E/E' RATIO: 8.13 M/S
LV SEPTAL E/E' RATIO: 10.83 M/S
MV MEAN GRADIENT: 2 MMHG
MV PEAK A VEL: 0.92 M/S
MV PEAK E VEL: 0.65 M/S
MV PEAK GRADIENT: 3 MMHG
MV STENOSIS PRESSURE HALF TIME: 56.9 MS
MV VALVE AREA P 1/2 METHOD: 3.87 CM2
PISA AR MAX VEL: 3.92 M/S
PISA TR MAX VEL: 2 M/S
PV PEAK VELOCITY: 0.8 CM/S
RA MAJOR: 3.54 CM
RA PRESSURE: 8 MMHG
RA WIDTH: 3 CM
RV TISSUE DOPPLER FREE WALL SYSTOLIC VELOCITY 1 (APICAL 4 CHAMBER VIEW): 15.96 CM/S
SINUS: 2.73 CM
STJ: 2.47 CM
TDI LATERAL: 0.08 M/S
TDI SEPTAL: 0.06 M/S
TDI: 0.07 M/S
TR MAX PG: 16 MMHG
TRICUSPID ANNULAR PLANE SYSTOLIC EXCURSION: 2.57 CM
TV REST PULMONARY ARTERY PRESSURE: 24 MMHG

## 2023-06-22 PROCEDURE — 93306 TTE W/DOPPLER COMPLETE: CPT

## 2023-06-22 PROCEDURE — 93227 HOLTER MONITOR - 24 HOUR (CUPID ONLY): ICD-10-PCS | Mod: ,,, | Performed by: INTERNAL MEDICINE

## 2023-06-22 PROCEDURE — 93225 XTRNL ECG REC<48 HRS REC: CPT

## 2023-06-22 PROCEDURE — 93306 TTE W/DOPPLER COMPLETE: CPT | Mod: 26,,, | Performed by: INTERNAL MEDICINE

## 2023-06-22 PROCEDURE — 93306 ECHO (CUPID ONLY): ICD-10-PCS | Mod: 26,,, | Performed by: INTERNAL MEDICINE

## 2023-06-22 PROCEDURE — 93227 XTRNL ECG REC<48 HR R&I: CPT | Mod: ,,, | Performed by: INTERNAL MEDICINE

## 2023-06-23 ENCOUNTER — OFFICE VISIT (OUTPATIENT)
Dept: CARDIOLOGY | Facility: CLINIC | Age: 63
End: 2023-06-23
Payer: COMMERCIAL

## 2023-06-23 VITALS
DIASTOLIC BLOOD PRESSURE: 70 MMHG | BODY MASS INDEX: 27.51 KG/M2 | RESPIRATION RATE: 18 BRPM | WEIGHT: 175.25 LBS | SYSTOLIC BLOOD PRESSURE: 118 MMHG | OXYGEN SATURATION: 98 % | HEIGHT: 67 IN | HEART RATE: 73 BPM

## 2023-06-23 DIAGNOSIS — R00.1 BRADYCARDIA: Primary | ICD-10-CM

## 2023-06-23 DIAGNOSIS — R53.1 WEAKNESS: ICD-10-CM

## 2023-06-23 PROCEDURE — 3008F BODY MASS INDEX DOCD: CPT | Mod: CPTII,S$GLB,, | Performed by: INTERNAL MEDICINE

## 2023-06-23 PROCEDURE — 3044F HG A1C LEVEL LT 7.0%: CPT | Mod: CPTII,S$GLB,, | Performed by: INTERNAL MEDICINE

## 2023-06-23 PROCEDURE — 3074F PR MOST RECENT SYSTOLIC BLOOD PRESSURE < 130 MM HG: ICD-10-PCS | Mod: CPTII,S$GLB,, | Performed by: INTERNAL MEDICINE

## 2023-06-23 PROCEDURE — 1159F PR MEDICATION LIST DOCUMENTED IN MEDICAL RECORD: ICD-10-PCS | Mod: CPTII,S$GLB,, | Performed by: INTERNAL MEDICINE

## 2023-06-23 PROCEDURE — 3078F PR MOST RECENT DIASTOLIC BLOOD PRESSURE < 80 MM HG: ICD-10-PCS | Mod: CPTII,S$GLB,, | Performed by: INTERNAL MEDICINE

## 2023-06-23 PROCEDURE — 99214 OFFICE O/P EST MOD 30 MIN: CPT | Mod: S$GLB,,, | Performed by: INTERNAL MEDICINE

## 2023-06-23 PROCEDURE — 3044F PR MOST RECENT HEMOGLOBIN A1C LEVEL <7.0%: ICD-10-PCS | Mod: CPTII,S$GLB,, | Performed by: INTERNAL MEDICINE

## 2023-06-23 PROCEDURE — 3078F DIAST BP <80 MM HG: CPT | Mod: CPTII,S$GLB,, | Performed by: INTERNAL MEDICINE

## 2023-06-23 PROCEDURE — 99214 PR OFFICE/OUTPT VISIT, EST, LEVL IV, 30-39 MIN: ICD-10-PCS | Mod: S$GLB,,, | Performed by: INTERNAL MEDICINE

## 2023-06-23 PROCEDURE — 3008F PR BODY MASS INDEX (BMI) DOCUMENTED: ICD-10-PCS | Mod: CPTII,S$GLB,, | Performed by: INTERNAL MEDICINE

## 2023-06-23 PROCEDURE — 99999 PR PBB SHADOW E&M-EST. PATIENT-LVL III: ICD-10-PCS | Mod: PBBFAC,,, | Performed by: INTERNAL MEDICINE

## 2023-06-23 PROCEDURE — 99999 PR PBB SHADOW E&M-EST. PATIENT-LVL III: CPT | Mod: PBBFAC,,, | Performed by: INTERNAL MEDICINE

## 2023-06-23 PROCEDURE — 3074F SYST BP LT 130 MM HG: CPT | Mod: CPTII,S$GLB,, | Performed by: INTERNAL MEDICINE

## 2023-06-23 PROCEDURE — 1159F MED LIST DOCD IN RCRD: CPT | Mod: CPTII,S$GLB,, | Performed by: INTERNAL MEDICINE

## 2023-06-23 NOTE — PROGRESS NOTES
Subjective:   Patient ID:  Cloie Bodden Reyes is a 62 y.o. female who presents for follow-up of Results      HPI    Referred by Dr Whitney  Cloie Bodden Reyes is a 62 y.o. female with multiple medical diagnoses as listed in the medical history and problem list that presents for back pain.  Pt is new to me.   She reports increased back pain that she has had for the last few weeks.  She describes a nagging pain.  It waxes and wanes.  It is located on the left side.  She has used voltaren with some improvement.  She has soreness.  She reports weakness that she has had for the past 2 weeks.  She thought she was going to die.  She did not go to the ER when she got the sensation.  I asked why she did not she indicated that it did not feel bad enough to go to the ER. She is concerned about something in her blood.  She reports good sleep.  Noted elevated potassium in the past. She denies a previous history of potassium issues.  No fever.  No chills. No NS.    EKG 6/6/23 sinus bradycardia 55 early repol  TSH 1.4 (6/6/23)    Echo 6/22/23  The left ventricle is normal in size with concentric remodeling and normal systolic function.  The estimated ejection fraction is 55%.  Normal right ventricular size with normal right ventricular systolic function.  Mild aortic regurgitation.  Mild tricuspid regurgitation.  The estimated PA systolic pressure is 24 mmHg.     Holter 6/22/23 Pending     6/7/23 Recent episode with generalized weakness. Denies CP or SOB. Denies LOC   Echo and holter for recent episode of weakness and bradycardia     6/23/23 Denies CP or SOB  Denies dizziness or LOC  BP controlled    Review of Systems   Constitutional: Negative for decreased appetite.   HENT:  Negative for ear discharge.    Eyes:  Negative for blurred vision.   Respiratory:  Negative for hemoptysis.    Endocrine: Negative for polyphagia.   Hematologic/Lymphatic: Negative for adenopathy.   Skin:  Negative for color change.   Musculoskeletal:   Negative for joint swelling.   Genitourinary:  Negative for bladder incontinence.   Neurological:  Negative for brief paralysis.   Psychiatric/Behavioral:  Negative for hallucinations.    Allergic/Immunologic: Negative for hives.     Objective:   Physical Exam  Constitutional:       Appearance: She is well-developed.   HENT:      Head: Normocephalic and atraumatic.   Eyes:      Conjunctiva/sclera: Conjunctivae normal.      Pupils: Pupils are equal, round, and reactive to light.   Cardiovascular:      Rate and Rhythm: Normal rate.      Pulses: Intact distal pulses.      Heart sounds: Normal heart sounds.   Pulmonary:      Effort: Pulmonary effort is normal.      Breath sounds: Normal breath sounds.   Abdominal:      General: Bowel sounds are normal.      Palpations: Abdomen is soft.   Musculoskeletal:         General: Normal range of motion.      Cervical back: Normal range of motion and neck supple.   Skin:     General: Skin is warm and dry.   Neurological:      Mental Status: She is alert and oriented to person, place, and time.       Assessment:      1. Bradycardia    2. Weakness        Plan:     Echo with EF 55% and mild AI  F/U holter - if stable then OV 6 months

## 2023-06-24 LAB
OHS CV EVENT MONITOR DAY: 1
OHS CV HOLTER LENGTH DECIMAL HOURS: 48
OHS CV HOLTER LENGTH HOURS: 24
OHS CV HOLTER LENGTH MINUTES: 0
OHS CV HOLTER SINUS AVERAGE HR: 64
OHS CV HOLTER SINUS MAX HR: 111
OHS CV HOLTER SINUS MIN HR: 50

## 2023-06-26 ENCOUNTER — TELEPHONE (OUTPATIENT)
Dept: CARDIOLOGY | Facility: CLINIC | Age: 63
End: 2023-06-26
Payer: COMMERCIAL

## 2023-06-26 NOTE — TELEPHONE ENCOUNTER
Spoke with patient on today in regards to her results informed patient that we are not allowed to give out any results to patients without the doctors consent. Provider will discuss results with patient at follow up visit.

## 2023-06-27 ENCOUNTER — HOSPITAL ENCOUNTER (EMERGENCY)
Facility: HOSPITAL | Age: 63
Discharge: HOME | End: 2023-06-27
Attending: EMERGENCY MEDICINE
Payer: COMMERCIAL

## 2023-06-27 VITALS
WEIGHT: 175.25 LBS | BODY MASS INDEX: 27.45 KG/M2 | SYSTOLIC BLOOD PRESSURE: 128 MMHG | RESPIRATION RATE: 18 BRPM | OXYGEN SATURATION: 98 % | DIASTOLIC BLOOD PRESSURE: 70 MMHG | TEMPERATURE: 98 F | HEART RATE: 70 BPM

## 2023-06-27 DIAGNOSIS — R53.1 WEAKNESS: ICD-10-CM

## 2023-06-27 DIAGNOSIS — R07.9 CHEST PAIN: ICD-10-CM

## 2023-06-27 LAB
ALBUMIN SERPL BCP-MCNC: 4 G/DL (ref 3.5–5.2)
ALP SERPL-CCNC: 73 U/L (ref 55–135)
ALT SERPL W/O P-5'-P-CCNC: 16 U/L (ref 10–44)
ANION GAP SERPL CALC-SCNC: 9 MMOL/L (ref 8–16)
AST SERPL-CCNC: 18 U/L (ref 10–40)
BASOPHILS # BLD AUTO: 0.02 K/UL (ref 0–0.2)
BASOPHILS NFR BLD: 0.5 % (ref 0–1.9)
BILIRUB SERPL-MCNC: 0.4 MG/DL (ref 0.1–1)
BNP SERPL-MCNC: 28 PG/ML (ref 0–99)
BUN SERPL-MCNC: 10 MG/DL (ref 6–30)
BUN SERPL-MCNC: 9 MG/DL (ref 8–23)
CALCIUM SERPL-MCNC: 10 MG/DL (ref 8.7–10.5)
CHLORIDE SERPL-SCNC: 104 MMOL/L (ref 95–110)
CHLORIDE SERPL-SCNC: 98 MMOL/L (ref 95–110)
CO2 SERPL-SCNC: 28 MMOL/L (ref 23–29)
CREAT SERPL-MCNC: 0.7 MG/DL (ref 0.5–1.4)
CREAT SERPL-MCNC: 0.7 MG/DL (ref 0.5–1.4)
DIFFERENTIAL METHOD: ABNORMAL
EOSINOPHIL # BLD AUTO: 0.1 K/UL (ref 0–0.5)
EOSINOPHIL NFR BLD: 2.4 % (ref 0–8)
ERYTHROCYTE [DISTWIDTH] IN BLOOD BY AUTOMATED COUNT: 12 % (ref 11.5–14.5)
EST. GFR  (NO RACE VARIABLE): >60 ML/MIN/1.73 M^2
GLUCOSE SERPL-MCNC: 86 MG/DL (ref 70–110)
GLUCOSE SERPL-MCNC: 91 MG/DL (ref 70–110)
HCT VFR BLD AUTO: 42.1 % (ref 37–48.5)
HCT VFR BLD CALC: 42 %PCV (ref 36–54)
HCV AB SERPL QL IA: NORMAL
HGB BLD-MCNC: 13.7 G/DL (ref 12–16)
HIV 1+2 AB+HIV1 P24 AG SERPL QL IA: NORMAL
IMM GRANULOCYTES # BLD AUTO: 0 K/UL (ref 0–0.04)
IMM GRANULOCYTES NFR BLD AUTO: 0 % (ref 0–0.5)
LYMPHOCYTES # BLD AUTO: 1.8 K/UL (ref 1–4.8)
LYMPHOCYTES NFR BLD: 47.2 % (ref 18–48)
MCH RBC QN AUTO: 27.1 PG (ref 27–31)
MCHC RBC AUTO-ENTMCNC: 32.5 G/DL (ref 32–36)
MCV RBC AUTO: 83 FL (ref 82–98)
MONOCYTES # BLD AUTO: 0.3 K/UL (ref 0.3–1)
MONOCYTES NFR BLD: 6.6 % (ref 4–15)
NEUTROPHILS # BLD AUTO: 1.6 K/UL (ref 1.8–7.7)
NEUTROPHILS NFR BLD: 43.3 % (ref 38–73)
NRBC BLD-RTO: 0 /100 WBC
PLATELET # BLD AUTO: 194 K/UL (ref 150–450)
PMV BLD AUTO: 10.6 FL (ref 9.2–12.9)
POC CARDIAC TROPONIN I: 0 NG/ML (ref 0–0.08)
POC IONIZED CALCIUM: 1.19 MMOL/L (ref 1.06–1.42)
POC TCO2 (MEASURED): 28 MMOL/L (ref 23–29)
POTASSIUM BLD-SCNC: 4.4 MMOL/L (ref 3.5–5.1)
POTASSIUM SERPL-SCNC: 4.6 MMOL/L (ref 3.5–5.1)
PROT SERPL-MCNC: 7.3 G/DL (ref 6–8.4)
RBC # BLD AUTO: 5.05 M/UL (ref 4–5.4)
SAMPLE: NORMAL
SAMPLE: NORMAL
SODIUM BLD-SCNC: 139 MMOL/L (ref 136–145)
SODIUM SERPL-SCNC: 141 MMOL/L (ref 136–145)
TROPONIN I SERPL DL<=0.01 NG/ML-MCNC: <0.006 NG/ML (ref 0–0.03)
WBC # BLD AUTO: 3.77 K/UL (ref 3.9–12.7)

## 2023-06-27 PROCEDURE — 83880 ASSAY OF NATRIURETIC PEPTIDE: CPT | Performed by: EMERGENCY MEDICINE

## 2023-06-27 PROCEDURE — 82308 ASSAY OF CALCITONIN: CPT | Mod: 91

## 2023-06-27 PROCEDURE — 93010 EKG 12-LEAD: ICD-10-PCS | Mod: ,,, | Performed by: INTERNAL MEDICINE

## 2023-06-27 PROCEDURE — 84484 ASSAY OF TROPONIN QUANT: CPT

## 2023-06-27 PROCEDURE — 84484 ASSAY OF TROPONIN QUANT: CPT | Performed by: EMERGENCY MEDICINE

## 2023-06-27 PROCEDURE — 99285 EMERGENCY DEPT VISIT HI MDM: CPT | Mod: 25

## 2023-06-27 PROCEDURE — 80047 BASIC METABLC PNL IONIZED CA: CPT

## 2023-06-27 PROCEDURE — 80053 COMPREHEN METABOLIC PANEL: CPT | Performed by: EMERGENCY MEDICINE

## 2023-06-27 PROCEDURE — 85025 COMPLETE CBC W/AUTO DIFF WBC: CPT | Performed by: EMERGENCY MEDICINE

## 2023-06-27 PROCEDURE — 93005 ELECTROCARDIOGRAM TRACING: CPT

## 2023-06-27 PROCEDURE — 87389 HIV-1 AG W/HIV-1&-2 AB AG IA: CPT | Performed by: PHYSICIAN ASSISTANT

## 2023-06-27 PROCEDURE — 86803 HEPATITIS C AB TEST: CPT | Performed by: PHYSICIAN ASSISTANT

## 2023-06-27 PROCEDURE — 93010 ELECTROCARDIOGRAM REPORT: CPT | Mod: ,,, | Performed by: INTERNAL MEDICINE

## 2023-06-27 RX ORDER — DIPHENHYDRAMINE HCL 25 MG
25 CAPSULE ORAL EVERY 6 HOURS PRN
COMMUNITY
End: 2024-02-14

## 2023-06-27 NOTE — DISCHARGE INSTRUCTIONS
Your testing is not revealed a cause of your weakness.  Your blood work is normal and the workup of your heart thus far has not shown significant abnormality.  I recommend that you follow-up with your primary care for ongoing weakness.

## 2023-06-27 NOTE — ED TRIAGE NOTES
Patient presents to the ED with reports generalized weakness for the past week. Patient reports a stabbing pain to the upper left chest. Patient feels like she can just go to sleep all day. Patient has been seen by a cardiologist, but has not had any results yet. Patient shared she got worried so she came to the ED.

## 2023-06-28 NOTE — ED PROVIDER NOTES
Encounter Date: 6/27/2023       History     Chief Complaint   Patient presents with    Weakness     Pt. Denying pain. States was seen at  A week ago but still feels like something is wrong.      63 yo F presents w/ c/o weakness. She has had the symptoms for at least 1 month and they have been worse for a few weeks. She denies chest pain, SOB, or RODRIGUEZ but does report decreased exercise tolerance. She reports a recent ECHO and holter showing some valvular insufficiency. She does not know her Holter results. She reports he PCM wants her to start medication for 'stress' and she does not want to do so. She denies orthopnea or PND. She denies fever or chills      Review of patient's allergies indicates:   Allergen Reactions    Ibuprofen Other (See Comments)     Leg and hand     Past Medical History:   Diagnosis Date    Allergy     Fibrocystic breast     History of cholecystectomy      Past Surgical History:   Procedure Laterality Date    BIOPSY  10/28/2020    BREAST BIOPSY Right 10/28/2020    Benign fibrotic breast tissue with fibroadenomatoid change and duct ectasia    CHOLECYSTECTOMY      HERNIA REPAIR      VAGINAL DELIVERY       Family History   Problem Relation Age of Onset    Cancer Mother         kidney    Endometrial cancer Mother     Diabetes Father     Diabetes Sister     Stroke Brother     Clotting disorder Son     Diabetes Brother     No Known Problems Sister     No Known Problems Son     Endometrial cancer Maternal Grandmother     Amblyopia Neg Hx     Blindness Neg Hx     Cataracts Neg Hx     Glaucoma Neg Hx     Macular degeneration Neg Hx     Retinal detachment Neg Hx     Strabismus Neg Hx      Social History     Tobacco Use    Smoking status: Never    Smokeless tobacco: Never   Substance Use Topics    Alcohol use: No     Alcohol/week: 0.0 standard drinks    Drug use: No     Review of Systems  All other systems reviewed and negative except as noted in HPI    Physical Exam     Initial Vitals [06/27/23 1008]    BP Pulse Resp Temp SpO2   123/68 72 16 98.5 °F (36.9 °C) 100 %      MAP       --         Physical Exam  General: AO x 3, NAD. Well nourished. Well Developed  Head: Normocephalic and Atraumatic  HEENT: PERRLA. EOMI. OP Clear  Neck: Supple, Nontender in midline.  Cardiovascular: RRR. No m/r/g. 2+ Distal Pulses  Pulm/Chest: Chest nontender. Lungs clear to auscultation. No respiratory distress.  Abdomen: Nontender. Nondistended. No rigidity, rebound, or guarding  MSK: extremities atraumatic x 4. Gait normal  Ext: no clubbing, cyanosis, or edema  Neuro: GCS 15. CN II-XII intact. Intact symmetric sensation, strength, DTR x 4 extremities  Skin: no bullae, petechiae, or purpura. Warm, dry, and intact.  Psych: normal mood and affect.    ED Course   Procedures  Labs Reviewed   CBC W/ AUTO DIFFERENTIAL - Abnormal; Notable for the following components:       Result Value    WBC 3.77 (*)     Gran # (ANC) 1.6 (*)     All other components within normal limits   HIV 1 / 2 ANTIBODY    Narrative:     Release to patient->Immediate   HEPATITIS C ANTIBODY    Narrative:     Release to patient->Immediate   COMPREHENSIVE METABOLIC PANEL   TROPONIN I   B-TYPE NATRIURETIC PEPTIDE   TROPONIN ISTAT   ISTAT PROCEDURE     EKG Readings: (Independently Interpreted)   Initial Reading: No STEMI. Previous EKG: Compared with most recent EKG Rhythm: Normal Sinus Rhythm.   ECG Results              EKG 12-lead (Final result)  Result time 06/27/23 12:47:28      Final result by Interface, [facility] (06/27/23 12:47:28)                   Narrative:    Test Reason : R07.9,    Vent. Rate : 068 BPM     Atrial Rate : 068 BPM     P-R Int : 166 ms          QRS Dur : 080 ms      QT Int : 376 ms       P-R-T Axes : 003 039 059 degrees     QTc Int : 399 ms    Normal sinus rhythm  Normal ECG  When compared with ECG of 06-JUN-2023 09:12,  No significant change was found  Confirmed by Ashanti Tiwari MD ([id number]) on 6/27/2023 12:47:17 PM    Referred By: [email or web address]   SELF            Confirmed By:Ashanti Tiwari MD                                  Imaging Results              X-Ray Chest PA And Lateral (Final result)  Result time 06/27/23 12:29:04      Final result by Charly Patterson MD (06/27/23 12:29:04)                   Impression:      See above      Electronically signed by: Charly Patterson MD  Date:    06/27/2023  Time:    12:29               Narrative:    EXAMINATION:  XR CHEST PA AND LATERAL    CLINICAL HISTORY:  Chest Pain;    TECHNIQUE:  PA and lateral views of the chest were performed.    COMPARISON:  06/06/2023    FINDINGS:  Cardiac size is normal.  Lungs are clear.  No pulmonary abnormality is seen.                                       Medications - No data to display  Medical Decision Making:   History:   Old Medical Records: I decided to obtain old medical records.  Old Records Summarized: records from clinic visits and records from previous admission(s).       <> Summary of Records: Holter unremarkable  ECHO with mild valvular insuff.  Initial Assessment:   Presentation and prior testing not c/w HFrEF  Will obtain CBC to assess for anemia  Will obtain bnp  Ecg reassuring    Independently Interpreted Test(s):   I have ordered and independently interpreted X-rays - see prior notes.  I have ordered and independently interpreted EKG Reading(s) - see prior notes  Clinical Tests:   Lab Tests: Ordered and Reviewed  Radiological Study: Ordered and Reviewed  Medical Tests: Ordered and Reviewed  ED Management:  Testing reassuring w/ regards to possible ACS or HF as cause of symptoms  Plan for DC to f/u with cardiology and PCM           ED Course as of 06/28/23 1444   Tue Jun 27, 2023   1242 Troponin is unremarkable.  Despite mild regurgitation seen on prior echocardiogram, patient's BNP remains unremarkable.  No significant electrolyte abnormalities to suggest a reason for patient's acute on chronic weakness.  No significant CBC abnormalities to suggest a reason for  patient's acute on chronic weakness. [DS]      ED Course User Index  [DS] Glen Cain MD                 Clinical Impression:   Final diagnoses:  [R53.1] Weakness  [R07.9] Chest pain        ED Disposition Condition    Discharge Stable          ED Prescriptions    None       Follow-up Information       Follow up With Specialties Details Why Contact Info    Vivi Stiles MD Family Medicine, Wound Care Schedule an appointment as soon as possible for a visit   [address] [facility] [id number] [phone]               Glen Cain MD  06/28/23 0279

## 2023-07-12 ENCOUNTER — OFFICE VISIT (OUTPATIENT)
Dept: OBSTETRICS AND GYNECOLOGY | Facility: CLINIC | Age: 63
End: 2023-07-12
Payer: COMMERCIAL

## 2023-07-12 VITALS
WEIGHT: 175.06 LBS | BODY MASS INDEX: 27.42 KG/M2 | DIASTOLIC BLOOD PRESSURE: 78 MMHG | SYSTOLIC BLOOD PRESSURE: 108 MMHG

## 2023-07-12 DIAGNOSIS — Z01.419 WELL WOMAN EXAM WITH ROUTINE GYNECOLOGICAL EXAM: Primary | ICD-10-CM

## 2023-07-12 DIAGNOSIS — Z12.31 BREAST CANCER SCREENING BY MAMMOGRAM: ICD-10-CM

## 2023-07-12 DIAGNOSIS — Z00.00 ANNUAL PHYSICAL EXAM: ICD-10-CM

## 2023-07-12 DIAGNOSIS — Z12.4 ENCOUNTER FOR PAPANICOLAOU SMEAR FOR CERVICAL CANCER SCREENING: ICD-10-CM

## 2023-07-12 PROCEDURE — 3078F DIAST BP <80 MM HG: CPT | Mod: CPTII,S$GLB,, | Performed by: OBSTETRICS & GYNECOLOGY

## 2023-07-12 PROCEDURE — 3078F PR MOST RECENT DIASTOLIC BLOOD PRESSURE < 80 MM HG: ICD-10-PCS | Mod: CPTII,S$GLB,, | Performed by: OBSTETRICS & GYNECOLOGY

## 2023-07-12 PROCEDURE — 99386 PR PREVENTIVE VISIT,NEW,40-64: ICD-10-PCS | Mod: S$GLB,,, | Performed by: OBSTETRICS & GYNECOLOGY

## 2023-07-12 PROCEDURE — 88175 CYTOPATH C/V AUTO FLUID REDO: CPT | Performed by: OBSTETRICS & GYNECOLOGY

## 2023-07-12 PROCEDURE — 3008F BODY MASS INDEX DOCD: CPT | Mod: CPTII,S$GLB,, | Performed by: OBSTETRICS & GYNECOLOGY

## 2023-07-12 PROCEDURE — 1159F PR MEDICATION LIST DOCUMENTED IN MEDICAL RECORD: ICD-10-PCS | Mod: CPTII,S$GLB,, | Performed by: OBSTETRICS & GYNECOLOGY

## 2023-07-12 PROCEDURE — 3044F PR MOST RECENT HEMOGLOBIN A1C LEVEL <7.0%: ICD-10-PCS | Mod: CPTII,S$GLB,, | Performed by: OBSTETRICS & GYNECOLOGY

## 2023-07-12 PROCEDURE — 3074F SYST BP LT 130 MM HG: CPT | Mod: CPTII,S$GLB,, | Performed by: OBSTETRICS & GYNECOLOGY

## 2023-07-12 PROCEDURE — 87624 HPV HI-RISK TYP POOLED RSLT: CPT | Performed by: OBSTETRICS & GYNECOLOGY

## 2023-07-12 PROCEDURE — 3044F HG A1C LEVEL LT 7.0%: CPT | Mod: CPTII,S$GLB,, | Performed by: OBSTETRICS & GYNECOLOGY

## 2023-07-12 PROCEDURE — 99386 PREV VISIT NEW AGE 40-64: CPT | Mod: S$GLB,,, | Performed by: OBSTETRICS & GYNECOLOGY

## 2023-07-12 PROCEDURE — 3074F PR MOST RECENT SYSTOLIC BLOOD PRESSURE < 130 MM HG: ICD-10-PCS | Mod: CPTII,S$GLB,, | Performed by: OBSTETRICS & GYNECOLOGY

## 2023-07-12 PROCEDURE — 1159F MED LIST DOCD IN RCRD: CPT | Mod: CPTII,S$GLB,, | Performed by: OBSTETRICS & GYNECOLOGY

## 2023-07-12 PROCEDURE — 99999 PR PBB SHADOW E&M-EST. PATIENT-LVL III: ICD-10-PCS | Mod: PBBFAC,,, | Performed by: OBSTETRICS & GYNECOLOGY

## 2023-07-12 PROCEDURE — 3008F PR BODY MASS INDEX (BMI) DOCUMENTED: ICD-10-PCS | Mod: CPTII,S$GLB,, | Performed by: OBSTETRICS & GYNECOLOGY

## 2023-07-12 PROCEDURE — 99999 PR PBB SHADOW E&M-EST. PATIENT-LVL III: CPT | Mod: PBBFAC,,, | Performed by: OBSTETRICS & GYNECOLOGY

## 2023-07-12 NOTE — PROGRESS NOTES
SUBJECTIVE:   Cloie Bodden Reyes is a 62 y.o. female   for annual well woman exam. No LMP recorded (exact date). Patient is premenopausal..  She has no unusual complaints.        Menopause @ 57 age, denies HRT, denies VB  VMS improves    Past Medical History:   Diagnosis Date    Allergy     Fibrocystic breast     History of cholecystectomy      Past Surgical History:   Procedure Laterality Date    BIOPSY  10/28/2020    BREAST BIOPSY Right 10/28/2020    Benign fibrotic breast tissue with fibroadenomatoid change and duct ectasia    CHOLECYSTECTOMY      HERNIA REPAIR      VAGINAL DELIVERY       Social History     Socioeconomic History    Marital status:    Tobacco Use    Smoking status: Never    Smokeless tobacco: Never   Substance and Sexual Activity    Alcohol use: No     Alcohol/week: 0.0 standard drinks    Drug use: No    Sexual activity: Yes     Partners: Male     Family History   Problem Relation Age of Onset    Cancer Mother         kidney    Endometrial cancer Mother     Diabetes Father     Diabetes Sister     Stroke Brother     Clotting disorder Son     Diabetes Brother     No Known Problems Sister     No Known Problems Son     Endometrial cancer Maternal Grandmother     Amblyopia Neg Hx     Blindness Neg Hx     Cataracts Neg Hx     Glaucoma Neg Hx     Macular degeneration Neg Hx     Retinal detachment Neg Hx     Strabismus Neg Hx      OB History    Para Term  AB Living   2 2 2         SAB IAB Ectopic Multiple Live Births                  # Outcome Date GA Lbr Jovanny/2nd Weight Sex Delivery Anes PTL Lv   2 Term            1 Term               Obstetric Comments   Menopause @ age 57, denies HRT   Denies abnl pap, last pap  neg   Denies abnl MMG,    c-scope in 2017.         Current Outpatient Medications   Medication Sig Dispense Refill    acetaminophen (TYLENOL) 500 MG tablet Take 2 tablets every 8 hours by oral route as needed for 14 days.      aspirin (ECOTRIN) 81 MG EC tablet  Take 81 mg by mouth once.       diphenhydrAMINE (BENADRYL) 25 mg capsule Take 25 mg by mouth every 6 (six) hours as needed for Itching.      loratadine (CLARITIN) 10 mg tablet Take 1 tablet (10 mg total) by mouth daily as needed. 30 tablet 0    triamcinolone (NASACORT) 55 mcg nasal inhaler 2 sprays by Nasal route once daily.       Current Facility-Administered Medications   Medication Dose Route Frequency Provider Last Rate Last Admin    polidocanoL 1 % (20 mg/2 mL) injection Soln 0.2 mL  0.2 mL Intravenous 1 time in Clinic/HOD Kyler Daly MD         Allergies: Ibuprofen       ROS:  GENERAL: Denies weight gain or weight loss. Feeling well overall.   SKIN: Denies rash or lesions.   HEAD: Denies head injury or headache.   NODES: Denies enlarged lymph nodes.   CHEST: Denies chest pain or shortness of breath.   CARDIOVASCULAR: Denies palpitations or left sided chest pain.   ABDOMEN: No abdominal pain, constipation, diarrhea, nausea, vomiting or rectal bleeding.   URINARY: No frequency, dysuria, hematuria, or burning on urination.  REPRODUCTIVE: Denies vaginal discharge, abnormal vaginal bleeding, lesions, pelvic pain  BREASTS: The patient performs breast self-examination and denies pain, lumps, or nipple discharge.   HEMATOLOGIC: No easy bruisability or excessive bleeding.  MUSCULOSKELETAL: Denies joint pain or swelling.   NEUROLOGIC: Denies syncope or weakness.   PSYCHIATRIC: Denies depression, anxiety or mood swings.      OBJECTIVE:   /78   Wt 79.4 kg (175 lb 0.7 oz)   LMP  (Exact Date)   BMI 27.42 kg/m²   The patient appears well, alert, oriented x 3, in no distress.  PSYCH:  Normal, full range of affect  NECK: negative, no thyromegaly, trachea midline  SKIN: normal, good color, good turgor and no acne, striae, hirsutism  BREAST EXAM: breasts appear normal, no suspicious masses, no skin or nipple changes or axillary nodes  ABDOMEN: soft, non-tender; bowel sounds normal; no masses,  no organomegaly  and no hernias, masses, or hepatosplenomegaly  GENITALIA: normal external genitalia, no erythema, no discharge  BLADDER: soft  URETHRA: normal appearing urethra with no masses, tenderness or lesions and normal urethra, normal urethral meatus  VAGINA: Normal  CERVIX: no lesions or cervical motion tenderness  UTERUS: normal size, contour, position, consistency, mobility, non-tender  ADNEXA: no mass, fullness, tenderness      ASSESSMENT:   Dee was seen today for well woman.    Diagnoses and all orders for this visit:    Well woman exam with routine gynecological exam    Encounter for Papanicolaou smear for cervical cancer screening  -     Liquid-Based Pap Smear, Screening  -     HPV High Risk Genotypes, PCR    Breast cancer screening by mammogram  -     Mammo Digital Screening Bilat w/ Jonnie; Future    Annual physical exam  -     Ambulatory referral/consult to Gynecology        1. Health maintenance  -pap done. Discussed ASCCP guideline screening every 3 - 5 years.   -screening MMG ordered  -counseled on exercise and healthy diet, weight loss  -bone health:  Discussed Vitamin D and Calcium supplementation, weight bearing exercises  2.  Discussed safety at home/school/work, healthy and balanced diet, sleep hygiene, as well as violence/weapons exposure.

## 2023-07-17 ENCOUNTER — HOSPITAL ENCOUNTER (EMERGENCY)
Facility: HOSPITAL | Age: 63
Discharge: HOME | End: 2023-07-17
Attending: EMERGENCY MEDICINE
Payer: COMMERCIAL

## 2023-07-17 VITALS
DIASTOLIC BLOOD PRESSURE: 67 MMHG | TEMPERATURE: 99 F | WEIGHT: 174 LBS | HEIGHT: 67 IN | RESPIRATION RATE: 16 BRPM | OXYGEN SATURATION: 99 % | SYSTOLIC BLOOD PRESSURE: 120 MMHG | HEART RATE: 72 BPM | BODY MASS INDEX: 27.31 KG/M2

## 2023-07-17 DIAGNOSIS — R59.1 LYMPHADENOPATHY: ICD-10-CM

## 2023-07-17 DIAGNOSIS — G44.209 TENSION HEADACHE: Primary | ICD-10-CM

## 2023-07-17 LAB — POC RAPID STREP A: NEGATIVE

## 2023-07-17 PROCEDURE — 25000003 PHARM REV CODE 250: Mod: ER | Performed by: EMERGENCY MEDICINE

## 2023-07-17 PROCEDURE — 99284 EMERGENCY DEPT VISIT MOD MDM: CPT | Mod: 25,ER

## 2023-07-17 RX ORDER — ACETAMINOPHEN 500 MG
1000 TABLET ORAL
Status: COMPLETED | OUTPATIENT
Start: 2023-07-17 | End: 2023-07-17

## 2023-07-17 RX ORDER — AMOXICILLIN AND CLAVULANATE POTASSIUM 875; 125 MG/1; MG/1
1 TABLET, FILM COATED ORAL 2 TIMES DAILY
Qty: 20 TABLET | Refills: 0 | Status: SHIPPED | OUTPATIENT
Start: 2023-07-17 | End: 2023-07-27

## 2023-07-17 RX ORDER — ACETAMINOPHEN 500 MG
500 TABLET ORAL EVERY 6 HOURS PRN
Qty: 60 TABLET | Refills: 0 | Status: SHIPPED | OUTPATIENT
Start: 2023-07-17 | End: 2023-10-26 | Stop reason: SDUPTHER

## 2023-07-17 RX ADMIN — ACETAMINOPHEN 1000 MG: 500 TABLET, FILM COATED ORAL at 12:07

## 2023-07-17 NOTE — ED PROVIDER NOTES
"Encounter Date: 7/17/2023    SCRIBE #1 NOTE: I, Marti Lindsay, am scribing for, and in the presence of,  Rm Prieto MD. I have scribed the following portions of the note - Other sections scribed: HPI; ROS.     History     Chief Complaint   Patient presents with    Headache     A 61 y/o female presents to ED c/o Left sided head pain x 3 days. Pt took OTC without relief. Pt denies Dizziness and weakness.     Cloie Bodden Reyes is a 62 y.o. female with no pertinent medical Hx who presents to the ED for chief complaint of intermittent left sided headache, left otalgia, and anterior neck pain onset 2 days ago. Patient reports "it feels like ants on my head." She states the headache was temporarily alleviated after taking Tylenol. Patient notes she has pain with swallowing. She denies associated congestion, cough, numbness, tingling, or nausea. No further complaints at this time. Patient does not have any medical allergies.       The history is provided by the patient. No  was used.   Review of patient's allergies indicates:   Allergen Reactions    Ibuprofen Other (See Comments)     Leg and hand     Past Medical History:   Diagnosis Date    Allergy     Fibrocystic breast     History of cholecystectomy      Past Surgical History:   Procedure Laterality Date    BIOPSY  10/28/2020    BREAST BIOPSY Right 10/28/2020    Benign fibrotic breast tissue with fibroadenomatoid change and duct ectasia    CHOLECYSTECTOMY      HERNIA REPAIR      VAGINAL DELIVERY       Family History   Problem Relation Age of Onset    Cancer Mother         kidney    Endometrial cancer Mother     Diabetes Father     Diabetes Sister     Stroke Brother     Clotting disorder Son     Diabetes Brother     No Known Problems Sister     No Known Problems Son     Endometrial cancer Maternal Grandmother     Amblyopia Neg Hx     Blindness Neg Hx     Cataracts Neg Hx     Glaucoma Neg Hx     Macular degeneration Neg Hx     Retinal " detachment Neg Hx     Strabismus Neg Hx      Social History     Tobacco Use    Smoking status: Never    Smokeless tobacco: Never   Substance Use Topics    Alcohol use: No     Alcohol/week: 0.0 standard drinks    Drug use: No     Review of Systems   Constitutional: Negative.    HENT: Negative.  Negative for congestion.    Eyes: Negative.    Respiratory: Negative.  Negative for cough.    Cardiovascular: Negative.    Gastrointestinal: Negative.  Negative for nausea.   Endocrine: Negative.    Genitourinary: Negative.    Musculoskeletal: Negative.    Skin: Negative.    Allergic/Immunologic: Negative.    Neurological:  Positive for headaches. Negative for numbness.        Negative for tingling.    Hematological: Negative.    Psychiatric/Behavioral: Negative.       Physical Exam     Initial Vitals [07/17/23 1128]   BP Pulse Resp Temp SpO2   113/71 76 16 98.5 °F (36.9 °C) 98 %      MAP       --         Physical Exam    Nursing note and vitals reviewed.  Constitutional: She appears well-developed and well-nourished. She is not diaphoretic. No distress.   HENT:   Head: Normocephalic and atraumatic.   Nose: Nose normal.   Mildly erythematous oropharynx, edentulous.   Eyes: EOM are normal. Pupils are equal, round, and reactive to light.   Neck: Neck supple. No JVD present.   Normal range of motion.  Cardiovascular:  Normal rate, regular rhythm, normal heart sounds and intact distal pulses.           Pulmonary/Chest: Breath sounds normal. No stridor. No respiratory distress. She has no wheezes. She has no rhonchi. She has no rales.   Abdominal: Abdomen is soft. Bowel sounds are normal. She exhibits no distension. There is no abdominal tenderness.   Musculoskeletal:         General: No tenderness or edema. Normal range of motion.      Cervical back: Normal range of motion and neck supple.     Lymphadenopathy:     She has cervical adenopathy (mild left sided).   Neurological: She is alert and oriented to person, place, and time.  She has normal strength. No cranial nerve deficit.   Skin: Skin is warm and dry. Capillary refill takes less than 2 seconds. No rash noted. No erythema.       ED Course   Procedures  Labs Reviewed   POCT STREP A MOLECULAR   POCT STREP A, RAPID          Imaging Results              CT Head Without Contrast (Final result)  Result time 07/17/23 12:26:13      Final result by Elias Guzmán MD (07/17/23 12:26:13)                   Impression:      No acute intracranial abnormality.      Electronically signed by: Elias Guzmán MD  Date:    07/17/2023  Time:    12:26               Narrative:    EXAMINATION:  CT HEAD WITHOUT CONTRAST    CLINICAL HISTORY:  Headache, chronic, new features or increased frequency;    TECHNIQUE:  Low dose axial CT images obtained throughout the head without intravenous contrast. Sagittal and coronal reconstructions were performed.    COMPARISON:  None.    FINDINGS:  Intracranial compartment:    Ventricles and sulci are normal in size for age without evidence of hydrocephalus. No extra-axial blood or fluid collections.    The brain parenchyma appears within normal limits for age.  No parenchymal mass, hemorrhage, edema or major vascular distribution infarct.    Skull/extracranial contents (limited evaluation): No fracture. Mastoid air cells and paranasal sinuses are essentially clear.  Imaged portions of the orbits are within normal limits.                                       Medications   acetaminophen tablet 1,000 mg (1,000 mg Oral Given 7/17/23 1209)     Medical Decision Making:   History:   Old Medical Records: I decided to obtain old medical records.  Old Records Summarized: records from clinic visits and other records.       <> Summary of Records: External documents reviewed.   Clinical Tests:   Lab Tests: Ordered and Reviewed  Radiological Study: Ordered and Reviewed       MDM:    62-year-old female with past medical history as noted above presenting with headache, left-sided neck pain.   Physical exam as noted above.  ED workup notable for CT head unremarkable, strep throat unremarkable and negative.  Patient presentation consistent with suspected lymphadenitis with multiple cervical lymph nodes palpable, tender, no fluctuance noted, unclear of the etiology at this point as her additional exam reveals only mildly erythematous oropharynx.  Cranial nerves are fully intact, Tylenol resolving headache, additional CT scan ordered as patient had an atypical headache and atypical history of this headache.  She currently has no neuro deficits in resolution with Tylenol.  Will treat with Augmentin, have patient follow-up closely with her primary care physician for re-evaluation.  At this point time based on physical exam evaluation not suspect intracranial hemorrhage, acute CVA/TIA, cavernous venous thrombosis, sepsis, retropharyngeal abscess, peritonsillar abscess, malignancy, thyroiditis, osteo necrosis, osteomyelitis, or any further surgical or medical emergency.  Discussed diagnosis and further treatment with patient, including f/u.  Return precautions given and all questions answered.  Patient in understanding of plan.  Pt discharged to home improved and stable.       Scribe Attestation:   Scribe #1: I performed the above scribed service and the documentation accurately describes the services I performed. I attest to the accuracy of the note.            Scribe attestation: I, Rm Prieto M.D., personally performed the services described in this documentation.  All medical record entries made by the scribe were at my direction and in my presence.  I have reviewed the chart and agree that the record reflects my personal performance and is accurate and complete.         Clinical Impression:   Final diagnoses:  [R59.1] Lymphadenopathy  [G44.209] Tension headache (Primary)        ED Disposition Condition    Discharge Stable          ED Prescriptions       Medication Sig Dispense Start Date End Date  Auth. Provider    acetaminophen (TYLENOL) 500 MG tablet Take 1 tablet (500 mg total) by mouth every 6 (six) hours as needed. 60 tablet 7/17/2023 -- Rm Prieto MD    amoxicillin-clavulanate 875-125mg (AUGMENTIN) 875-125 mg per tablet Take 1 tablet by mouth 2 (two) times daily. for 10 days 20 tablet 7/17/2023 7/27/2023 Rm Prieto MD          Follow-up Information       Follow up With Specialties Details Why Contact Info    [facility] ED Emergency Medicine Go to  If symptoms worsen [address] [address] [phone]    Vivi Stiles MD Family Medicine, Wound Care Go in 1 week As needed [address] [address]  [phone]               Rm Prieto MD  07/17/23 0530

## 2023-07-18 LAB
HPV HR 12 DNA SPEC QL NAA+PROBE: NEGATIVE
HPV16 AG SPEC QL: NEGATIVE
HPV18 DNA SPEC QL NAA+PROBE: NEGATIVE

## 2023-07-19 LAB
FINAL PATHOLOGIC DIAGNOSIS: NORMAL
Lab: NORMAL

## 2023-07-19 NOTE — PROGRESS NOTES
Hi,     Your pap smear is normal.  I will see you next year for your well woman exam.     Let me know if you have any questions.    Dr. Jose Alberto Malik

## 2023-07-31 ENCOUNTER — OFFICE VISIT (OUTPATIENT)
Dept: CARDIOLOGY | Facility: CLINIC | Age: 63
End: 2023-07-31
Payer: COMMERCIAL

## 2023-07-31 VITALS
RESPIRATION RATE: 15 BRPM | BODY MASS INDEX: 27.93 KG/M2 | DIASTOLIC BLOOD PRESSURE: 78 MMHG | HEART RATE: 73 BPM | OXYGEN SATURATION: 99 % | SYSTOLIC BLOOD PRESSURE: 118 MMHG | WEIGHT: 177.94 LBS | HEIGHT: 67 IN

## 2023-07-31 DIAGNOSIS — R00.1 BRADYCARDIA: Primary | ICD-10-CM

## 2023-07-31 DIAGNOSIS — R53.1 WEAKNESS: ICD-10-CM

## 2023-07-31 PROCEDURE — 3008F PR BODY MASS INDEX (BMI) DOCUMENTED: ICD-10-PCS | Mod: CPTII,S$GLB,, | Performed by: INTERNAL MEDICINE

## 2023-07-31 PROCEDURE — 3078F PR MOST RECENT DIASTOLIC BLOOD PRESSURE < 80 MM HG: ICD-10-PCS | Mod: CPTII,S$GLB,, | Performed by: INTERNAL MEDICINE

## 2023-07-31 PROCEDURE — 3044F HG A1C LEVEL LT 7.0%: CPT | Mod: CPTII,S$GLB,, | Performed by: INTERNAL MEDICINE

## 2023-07-31 PROCEDURE — 3008F BODY MASS INDEX DOCD: CPT | Mod: CPTII,S$GLB,, | Performed by: INTERNAL MEDICINE

## 2023-07-31 PROCEDURE — 99213 OFFICE O/P EST LOW 20 MIN: CPT | Mod: S$GLB,,, | Performed by: INTERNAL MEDICINE

## 2023-07-31 PROCEDURE — 3074F PR MOST RECENT SYSTOLIC BLOOD PRESSURE < 130 MM HG: ICD-10-PCS | Mod: CPTII,S$GLB,, | Performed by: INTERNAL MEDICINE

## 2023-07-31 PROCEDURE — 99999 PR PBB SHADOW E&M-EST. PATIENT-LVL IV: ICD-10-PCS | Mod: PBBFAC,,, | Performed by: INTERNAL MEDICINE

## 2023-07-31 PROCEDURE — 3044F PR MOST RECENT HEMOGLOBIN A1C LEVEL <7.0%: ICD-10-PCS | Mod: CPTII,S$GLB,, | Performed by: INTERNAL MEDICINE

## 2023-07-31 PROCEDURE — 1159F MED LIST DOCD IN RCRD: CPT | Mod: CPTII,S$GLB,, | Performed by: INTERNAL MEDICINE

## 2023-07-31 PROCEDURE — 3078F DIAST BP <80 MM HG: CPT | Mod: CPTII,S$GLB,, | Performed by: INTERNAL MEDICINE

## 2023-07-31 PROCEDURE — 3074F SYST BP LT 130 MM HG: CPT | Mod: CPTII,S$GLB,, | Performed by: INTERNAL MEDICINE

## 2023-07-31 PROCEDURE — 1159F PR MEDICATION LIST DOCUMENTED IN MEDICAL RECORD: ICD-10-PCS | Mod: CPTII,S$GLB,, | Performed by: INTERNAL MEDICINE

## 2023-07-31 PROCEDURE — 99213 PR OFFICE/OUTPT VISIT, EST, LEVL III, 20-29 MIN: ICD-10-PCS | Mod: S$GLB,,, | Performed by: INTERNAL MEDICINE

## 2023-07-31 PROCEDURE — 99999 PR PBB SHADOW E&M-EST. PATIENT-LVL IV: CPT | Mod: PBBFAC,,, | Performed by: INTERNAL MEDICINE

## 2023-07-31 NOTE — PROGRESS NOTES
Subjective:   Patient ID:  Cloie Bodden Reyes is a 62 y.o. female who presents for follow-up of Results      HPI    Referred by Dr Whitney  Cloie Bodden Reyes is a 62 y.o. female with multiple medical diagnoses as listed in the medical history and problem list that presents for back pain.  Pt is new to me.   She reports increased back pain that she has had for the last few weeks.  She describes a nagging pain.  It waxes and wanes.  It is located on the left side.  She has used voltaren with some improvement.  She has soreness.  She reports weakness that she has had for the past 2 weeks.  She thought she was going to die.  She did not go to the ER when she got the sensation.  I asked why she did not she indicated that it did not feel bad enough to go to the ER. She is concerned about something in her blood.  She reports good sleep.  Noted elevated potassium in the past. She denies a previous history of potassium issues.  No fever.  No chills. No NS.     EKG 6/6/23 sinus bradycardia 55 early repol  TSH 1.4 (6/6/23)     Echo 6/22/23  The left ventricle is normal in size with concentric remodeling and normal systolic function.  The estimated ejection fraction is 55%.  Normal right ventricular size with normal right ventricular systolic function.  Mild aortic regurgitation.  Mild tricuspid regurgitation.  The estimated PA systolic pressure is 24 mmHg.     Holter 6/22/23   Nsr. Heart rates varied between 50 and 111 BPM with an average of 64 BPM.  There were very rare PVCs totalling 5  There were very rare PACs totalling 13     6/7/23 Recent episode with generalized weakness. Denies CP or SOB. Denies LOC   Echo and holter for recent episode of weakness and bradycardia     6/23/23 Denies CP or SOB  Denies dizziness or LOC  BP controlled  Echo with EF 55% and mild AI  F/U holter - if stable then OV 6 months    7/31/23 Denies CP, SOB, or dizziness  BP controlled    Review of Systems   Constitutional: Negative for decreased  appetite.   HENT:  Negative for ear discharge.    Eyes:  Negative for blurred vision.   Respiratory:  Negative for hemoptysis.    Endocrine: Negative for polyphagia.   Hematologic/Lymphatic: Negative for adenopathy.   Skin:  Negative for color change.   Musculoskeletal:  Negative for joint swelling.   Genitourinary:  Negative for bladder incontinence.   Neurological:  Negative for brief paralysis.   Psychiatric/Behavioral:  Negative for hallucinations.    Allergic/Immunologic: Negative for hives.       Objective:   Physical Exam  Constitutional:       Appearance: She is well-developed.   HENT:      Head: Normocephalic and atraumatic.   Eyes:      Conjunctiva/sclera: Conjunctivae normal.      Pupils: Pupils are equal, round, and reactive to light.   Cardiovascular:      Rate and Rhythm: Normal rate.      Pulses: Intact distal pulses.      Heart sounds: Normal heart sounds.   Pulmonary:      Effort: Pulmonary effort is normal.      Breath sounds: Normal breath sounds.   Abdominal:      General: Bowel sounds are normal.      Palpations: Abdomen is soft.   Musculoskeletal:         General: Normal range of motion.      Cervical back: Normal range of motion and neck supple.   Skin:     General: Skin is warm and dry.   Neurological:      Mental Status: She is alert and oriented to person, place, and time.         Assessment:      1. Bradycardia    2. Weakness        Plan:     Holter ok - no significant bradycardia or AVB  Continue to monitor  Cleared for dental work at low risk  OV 6 months

## 2023-10-18 ENCOUNTER — TELEPHONE (OUTPATIENT)
Dept: FAMILY MEDICINE | Facility: CLINIC | Age: 63
End: 2023-10-18
Payer: COMMERCIAL

## 2023-10-18 NOTE — TELEPHONE ENCOUNTER
----- Message from Jenelle Sibley sent at 10/18/2023  2:21 PM CDT -----  Type: Patient Call Back    Who called:Self    What is the request in detail:Medication for sinus    Can the clinic reply by [facility]?No    Would the patient rather a call back or a response via [facility]? Call    Best call back number:[phone]    Additional Information:

## 2023-10-23 ENCOUNTER — OFFICE VISIT (OUTPATIENT)
Dept: FAMILY MEDICINE | Facility: CLINIC | Age: 63
End: 2023-10-23
Payer: COMMERCIAL

## 2023-10-23 VITALS
WEIGHT: 176.06 LBS | HEART RATE: 78 BPM | DIASTOLIC BLOOD PRESSURE: 70 MMHG | OXYGEN SATURATION: 98 % | SYSTOLIC BLOOD PRESSURE: 110 MMHG | TEMPERATURE: 98 F | BODY MASS INDEX: 27.57 KG/M2

## 2023-10-23 DIAGNOSIS — R00.1 BRADYCARDIA: ICD-10-CM

## 2023-10-23 DIAGNOSIS — R53.1 WEAKNESS: ICD-10-CM

## 2023-10-23 DIAGNOSIS — J32.9 RECURRENT SINUSITIS: Primary | ICD-10-CM

## 2023-10-23 DIAGNOSIS — Z23 NEED FOR SHINGLES VACCINE: ICD-10-CM

## 2023-10-23 PROCEDURE — 99999 PR PBB SHADOW E&M-EST. PATIENT-LVL V: ICD-10-PCS | Mod: PBBFAC,,, | Performed by: NURSE PRACTITIONER

## 2023-10-23 PROCEDURE — 3044F HG A1C LEVEL LT 7.0%: CPT | Mod: CPTII,S$GLB,, | Performed by: NURSE PRACTITIONER

## 2023-10-23 PROCEDURE — 3074F PR MOST RECENT SYSTOLIC BLOOD PRESSURE < 130 MM HG: ICD-10-PCS | Mod: CPTII,S$GLB,, | Performed by: NURSE PRACTITIONER

## 2023-10-23 PROCEDURE — 99999 PR PBB SHADOW E&M-EST. PATIENT-LVL V: CPT | Mod: PBBFAC,,, | Performed by: NURSE PRACTITIONER

## 2023-10-23 PROCEDURE — 99213 PR OFFICE/OUTPT VISIT, EST, LEVL III, 20-29 MIN: ICD-10-PCS | Mod: S$GLB,,, | Performed by: NURSE PRACTITIONER

## 2023-10-23 PROCEDURE — 1159F PR MEDICATION LIST DOCUMENTED IN MEDICAL RECORD: ICD-10-PCS | Mod: CPTII,S$GLB,, | Performed by: NURSE PRACTITIONER

## 2023-10-23 PROCEDURE — 3078F DIAST BP <80 MM HG: CPT | Mod: CPTII,S$GLB,, | Performed by: NURSE PRACTITIONER

## 2023-10-23 PROCEDURE — 3008F BODY MASS INDEX DOCD: CPT | Mod: CPTII,S$GLB,, | Performed by: NURSE PRACTITIONER

## 2023-10-23 PROCEDURE — 1159F MED LIST DOCD IN RCRD: CPT | Mod: CPTII,S$GLB,, | Performed by: NURSE PRACTITIONER

## 2023-10-23 PROCEDURE — 3008F PR BODY MASS INDEX (BMI) DOCUMENTED: ICD-10-PCS | Mod: CPTII,S$GLB,, | Performed by: NURSE PRACTITIONER

## 2023-10-23 PROCEDURE — 3074F SYST BP LT 130 MM HG: CPT | Mod: CPTII,S$GLB,, | Performed by: NURSE PRACTITIONER

## 2023-10-23 PROCEDURE — 3078F PR MOST RECENT DIASTOLIC BLOOD PRESSURE < 80 MM HG: ICD-10-PCS | Mod: CPTII,S$GLB,, | Performed by: NURSE PRACTITIONER

## 2023-10-23 PROCEDURE — 99213 OFFICE O/P EST LOW 20 MIN: CPT | Mod: S$GLB,,, | Performed by: NURSE PRACTITIONER

## 2023-10-23 PROCEDURE — 3044F PR MOST RECENT HEMOGLOBIN A1C LEVEL <7.0%: ICD-10-PCS | Mod: CPTII,S$GLB,, | Performed by: NURSE PRACTITIONER

## 2023-10-23 RX ORDER — METHYLPREDNISOLONE 4 MG/1
TABLET ORAL
Qty: 21 EACH | Refills: 0 | Status: SHIPPED | OUTPATIENT
Start: 2023-10-23 | End: 2023-10-26

## 2023-10-23 NOTE — PATIENT INSTRUCTIONS
Medical Fitness--[phone]  Imaging, Xray, CT, MRI, Ultrasound---[phone]  Bariatrics---[phone]  Breast Surgery---[phone]  Case Management---[phone]  Colonoscopy---[phone]  DME---[phone]  Infectious Disease---[phone]  Interventional Radiology---[phone]  Medical Records---[phone]  [facility]---[phone]  Optometry/Ophthalmology---[phone]  [facility]---[phone]  Physical Therapy---[phone]  Psychiatry---[phone] or [phone]  Plastic Surgery---[phone]  Recovery--[phone] option 2, or [phone].  Sleep Study---[phone]  Smoking Cessation---[phone]  Wound Care---[phone]  Referral Desk---[phone]    Patient Education       Sinusitis Discharge Instructions, Adult   About this topic   Your sinuses are hollow areas in the bones of your face. They have a thin lining that normally makes a small amount of mucus. When you have sinusitis, the lining gets swollen and makes extra mucus. You may have sinusitis with or after a cold. Most of the time sinusitis will get better in 1 to 2 weeks.  Sinusitis is most often caused by a virus, so antibiotics wont help. But some people do need antibiotics. If the doctor ordered antibiotics for you, be sure to follow the instructions. It is important to take all of your antibiotics even if you start to feel better.       What care is needed at home?   Ask your doctor what you need to do when you go home. Make sure you ask questions if you do not understand what you need to do.  Try to thin the mucus.  Drink lots of liquids to stay hydrated.  Use a cool mist humidifier to avoid dry air.  Use saline nose drops or a saline nose rinse to relieve stuffiness.  Wash your hands often. This will help keep others healthy.  Do not smoke or be in smoke-filled places. Avoid things that may cause breathing problems like fumes, pollution, dust, and other common allergens and irritants.  You may want to take  medicine like ibuprofen, naproxen, or acetaminophen to help with pain.  What follow-up care is needed?   Your doctor may ask you to make visits to the office to check on your progress. Be sure to keep your visits.  Your doctor will tell you if other tests are needed.  Your doctor may send you for allergy tests or to an allergy expert.   What lifestyle changes are needed?   Avoid drinks that contain caffeine or alcohol.  Try to stop smoking. Avoid being around others who smoke. Smoke can damage the small hairs inside your sinuses.  What drugs may be needed?   The doctor may order drugs to:  Help with pain and swelling  Fight an infection  Control coughing  Dry up the sinuses  Help a runny or stuffy nose  Will physical activity be limited?   You do not have to limit your activity. You may want to rest more if you have a fever or headache.   What problems could happen?   Infections that happen again and again  Asthma attack  Coughing  Loss of voice  What can be done to prevent this health problem?   Keep your nose as moist as possible. Use saline sprays, washes, and a humidifier often.  Avoid being around cigarette and cigar smoke or strong odors from chemicals.  Avoid long periods of swimming in pools treated with chlorine. This can bother the lining of the nose and sinuses.  Avoid water diving. This forces water into the sinuses from the nasal passages.  Manage your allergies with your doctor's help.  Use an air conditioner if allergies are a problem.  Before air travel, use a drug to dry up mucus. As the plane takes off or lands, the pressure can cause sinus pain.  When do I need to call the doctor?   You have a stiff neck, especially if you also have fever, chills, vomiting, or severe headache  You have trouble thinking clearly.  You have trouble seeing or have double vision.  You have swelling or redness or pain around one or both eyes.  You have a fever of 102°F (38.9°C) or higher, or have shaking chills or  sweats.  You have an upset stomach and throwing up.  You have more pain in your face and head.  You are not getting better within 1 to 2 weeks.  Teach Back: Helping You Understand   The Teach Back Method helps you understand the information we are giving you. After you talk with the staff, tell them in your own words what you learned. This helps to make sure the staff has covered each thing clearly. It also helps to explain things that may have been confusing. Before going home, make sure you can do these:  I can tell you about my condition.  I can tell you what may help ease my breathing.  I can tell you what I will do if I have a fever, chills, or trouble breathing.  Where can I learn more?   American Academy of Family Physicians  https://familydoctor.org/condition/sinusitis/   Centers for Disease Control and Prevention  https://www.cdc.gov/antibiotic-use/community/for-hcp/outpatient-hcp/adult-treatment-rec.html   Last Reviewed Date   2021-06-09  Consumer Information Use and Disclaimer   This information is not specific medical advice and does not replace information you receive from your health care provider. This is only a brief summary of general information. It does NOT include all information about conditions, illnesses, injuries, tests, procedures, treatments, therapies, discharge instructions or life-style choices that may apply to you. You must talk with your health care provider for complete information about your health and treatment options. This information should not be used to decide whether or not to accept your health care providers advice, instructions or recommendations. Only your health care provider has the knowledge and training to provide advice that is right for you.  Copyright   Copyright © 2021 UpToDate, Inc. and its affiliates and/or licensors. All rights reserved.  Patient Education       Sinusitis in Adults   The Basics   Written by the doctors and editors at [employer]   What is  sinusitis? -- Sinusitis is a condition that can cause a stuffy nose, pain in the face, and discharge (mucus) from the nose. The sinuses are hollow areas in the bones of the face (figure 1). They have a thin lining that normally makes a small amount of mucus. When this lining gets irritated or infected, it swells and makes extra mucus. This causes symptoms.  Sinusitis can occur when a person gets sick with a cold. The germs causing the cold can also infect the sinuses. Many times, a person feels like their cold is getting better. But then they get sinusitis and begin to feel sick again.  What are the symptoms of sinusitis? -- Common symptoms of sinusitis include:  Stuffy or blocked nose  Thick white, yellow, or green discharge from the nose  Pain in the teeth  Pain or pressure in the face - This often feels worse when a person bends forward.  People with sinusitis can also have other symptoms that include:  Fever  Cough  Trouble smelling  Ear pressure or fullness  Headache  Bad breath  Feeling tired  Most of the time, symptoms start to improve in 7 to 10 days.  Should I see a doctor or nurse? -- See your doctor or nurse if your symptoms last more than 10 days, or if your symptoms first get better but then get worse.  Rarely, sinusitis can lead to serious problems. See your doctor or nurse right away (do not wait 10 days) if you have:  Fever higher than 102°F (38.9°C)  Sudden and severe pain in the face and head  Trouble seeing or seeing double  Trouble thinking clearly  Swelling or redness around one or both eyes  A stiff neck  Is there anything I can do on my own to feel better? -- Yes. To reduce your symptoms, you can:  Take an over-the-counter pain reliever to reduce the pain  Rinse your nose and sinuses with salt water a few times a day - Ask your doctor or nurse about the best way to do this.  Your doctor might also prescribe a steroid nose spray to reduce the swelling in your nose. (These kinds of steroid nose  "sprays are safe to take, and do not contain the same steroids that some athletes take illegally.)  How is sinusitis treated? -- Most of the time, sinusitis does not need to be treated with antibiotic medicines. This is because most sinusitis is caused by viruses, not bacteria, and antibiotics do not kill viruses. In fact, even sinusitis caused by bacteria will usually get better on its own without antibiotics.   Some people with sinusitis do need treatment with antibiotics. If your symptoms have not improved after 10 days, ask your doctor if you should take antibiotics. Your doctor might recommend that you wait 1 more week to see if your symptoms improve. But if you have symptoms such as a fever or a lot of pain, they might prescribe antibiotics. It is important to follow your doctor's instructions about taking your antibiotics.  What if my symptoms do not get better? -- If your symptoms do not get better, talk with your doctor or nurse. They might order tests to figure out why you still have symptoms. These can include:  CT scan or other imaging tests - Imaging tests create pictures of the inside of the body.  A test to look inside the sinuses - For this test, a doctor puts a thin tube with a camera on the end into the nose and up into the sinuses.  Some people get a lot of sinus infections or have symptoms that last at least 3 months. These people can have a different type of sinusitis called "chronic sinusitis." Chronic sinusitis can be caused by different things. For example, some people have growths inside their nose or sinuses that are called "polyps." Other people have allergies that cause their symptoms.  Chronic sinusitis can be treated in different ways. If you have chronic sinusitis, talk with your doctor about which treatments are right for you.  All topics are updated as new evidence becomes available and our peer review process is complete.  This topic retrieved from [employer] on: Sep 21, 2021.  Topic " 44397 Version 17.0  Release: 29.4.2 - C29.263  © 2021 UpToDate, Inc. and/or its affiliates. All rights reserved.  figure 1: Sinuses of the face     This drawing shows the sinuses of the face, from the side and front views.  Graphic 028877 Version 1.0    Consumer Information Use and Disclaimer   This information is not specific medical advice and does not replace information you receive from your health care provider. This is only a brief summary of general information. It does NOT include all information about conditions, illnesses, injuries, tests, procedures, treatments, therapies, discharge instructions or life-style choices that may apply to you. You must talk with your health care provider for complete information about your health and treatment options. This information should not be used to decide whether or not to accept your health care provider's advice, instructions or recommendations. Only your health care provider has the knowledge and training to provide advice that is right for you. The use of this information is governed by the [employer] End User License Agreement, available at https://www.[employer].Ubiregi/en/solutions/VERTILAS/about/mark.The use of [employer] content is governed by the [employer] Terms of Use. ©2021 UpToDate, Inc. All rights reserved.  Copyright   © 2021 UpToDate, Inc. and/or its affiliates. All rights reserved.
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
## 2023-10-25 ENCOUNTER — HOSPITAL ENCOUNTER (OUTPATIENT)
Dept: RADIOLOGY | Facility: HOSPITAL | Age: 63
Discharge: HOME | End: 2023-10-25
Attending: OBSTETRICS & GYNECOLOGY
Payer: COMMERCIAL

## 2023-10-25 DIAGNOSIS — Z12.31 BREAST CANCER SCREENING BY MAMMOGRAM: ICD-10-CM

## 2023-10-25 PROCEDURE — 77067 SCR MAMMO BI INCL CAD: CPT | Mod: TC

## 2023-10-25 PROCEDURE — 77063 BREAST TOMOSYNTHESIS BI: CPT | Mod: 26,,, | Performed by: RADIOLOGY

## 2023-10-25 PROCEDURE — 77067 MAMMO DIGITAL SCREENING BILAT WITH TOMO: ICD-10-PCS | Mod: 26,,, | Performed by: RADIOLOGY

## 2023-10-25 PROCEDURE — 77067 SCR MAMMO BI INCL CAD: CPT | Mod: 26,,, | Performed by: RADIOLOGY

## 2023-10-25 PROCEDURE — 77063 MAMMO DIGITAL SCREENING BILAT WITH TOMO: ICD-10-PCS | Mod: 26,,, | Performed by: RADIOLOGY

## 2023-10-26 ENCOUNTER — LAB VISIT (OUTPATIENT)
Dept: LAB | Facility: HOSPITAL | Age: 63
End: 2023-10-26
Attending: PHYSICIAN ASSISTANT
Payer: COMMERCIAL

## 2023-10-26 ENCOUNTER — OFFICE VISIT (OUTPATIENT)
Dept: OTOLARYNGOLOGY | Facility: CLINIC | Age: 63
End: 2023-10-26
Payer: COMMERCIAL

## 2023-10-26 VITALS
BODY MASS INDEX: 27.17 KG/M2 | WEIGHT: 173.5 LBS | HEART RATE: 62 BPM | SYSTOLIC BLOOD PRESSURE: 109 MMHG | DIASTOLIC BLOOD PRESSURE: 73 MMHG

## 2023-10-26 DIAGNOSIS — J31.0 CHRONIC RHINITIS: ICD-10-CM

## 2023-10-26 DIAGNOSIS — J34.3 HYPERTROPHY OF INFERIOR NASAL TURBINATE: ICD-10-CM

## 2023-10-26 DIAGNOSIS — K21.9 LARYNGOPHARYNGEAL REFLUX (LPR): ICD-10-CM

## 2023-10-26 DIAGNOSIS — R05.3 CHRONIC COUGH: Primary | ICD-10-CM

## 2023-10-26 DIAGNOSIS — J34.2 DEVIATED NASAL SEPTUM: ICD-10-CM

## 2023-10-26 DIAGNOSIS — M26.642 ARTHRITIS OF LEFT TEMPOROMANDIBULAR JOINT: ICD-10-CM

## 2023-10-26 LAB — IGE SERPL-ACNC: 54 IU/ML (ref 0–100)

## 2023-10-26 PROCEDURE — 3044F PR MOST RECENT HEMOGLOBIN A1C LEVEL <7.0%: ICD-10-PCS | Mod: CPTII,S$GLB,, | Performed by: PHYSICIAN ASSISTANT

## 2023-10-26 PROCEDURE — 3074F SYST BP LT 130 MM HG: CPT | Mod: CPTII,S$GLB,, | Performed by: PHYSICIAN ASSISTANT

## 2023-10-26 PROCEDURE — 86003 ALLG SPEC IGE CRUDE XTRC EA: CPT | Mod: 59 | Performed by: PHYSICIAN ASSISTANT

## 2023-10-26 PROCEDURE — 3008F PR BODY MASS INDEX (BMI) DOCUMENTED: ICD-10-PCS | Mod: CPTII,S$GLB,, | Performed by: PHYSICIAN ASSISTANT

## 2023-10-26 PROCEDURE — 1159F PR MEDICATION LIST DOCUMENTED IN MEDICAL RECORD: ICD-10-PCS | Mod: CPTII,S$GLB,, | Performed by: PHYSICIAN ASSISTANT

## 2023-10-26 PROCEDURE — 99999 PR PBB SHADOW E&M-EST. PATIENT-LVL IV: CPT | Mod: PBBFAC,,, | Performed by: PHYSICIAN ASSISTANT

## 2023-10-26 PROCEDURE — 1159F MED LIST DOCD IN RCRD: CPT | Mod: CPTII,S$GLB,, | Performed by: PHYSICIAN ASSISTANT

## 2023-10-26 PROCEDURE — 3074F PR MOST RECENT SYSTOLIC BLOOD PRESSURE < 130 MM HG: ICD-10-PCS | Mod: CPTII,S$GLB,, | Performed by: PHYSICIAN ASSISTANT

## 2023-10-26 PROCEDURE — 3078F PR MOST RECENT DIASTOLIC BLOOD PRESSURE < 80 MM HG: ICD-10-PCS | Mod: CPTII,S$GLB,, | Performed by: PHYSICIAN ASSISTANT

## 2023-10-26 PROCEDURE — 86003 ALLG SPEC IGE CRUDE XTRC EA: CPT | Performed by: PHYSICIAN ASSISTANT

## 2023-10-26 PROCEDURE — 31575 DIAGNOSTIC LARYNGOSCOPY: CPT | Mod: S$GLB,,, | Performed by: PHYSICIAN ASSISTANT

## 2023-10-26 PROCEDURE — 3008F BODY MASS INDEX DOCD: CPT | Mod: CPTII,S$GLB,, | Performed by: PHYSICIAN ASSISTANT

## 2023-10-26 PROCEDURE — 36415 COLL VENOUS BLD VENIPUNCTURE: CPT | Performed by: PHYSICIAN ASSISTANT

## 2023-10-26 PROCEDURE — 99204 PR OFFICE/OUTPT VISIT, NEW, LEVL IV, 45-59 MIN: ICD-10-PCS | Mod: 25,S$GLB,, | Performed by: PHYSICIAN ASSISTANT

## 2023-10-26 PROCEDURE — 3044F HG A1C LEVEL LT 7.0%: CPT | Mod: CPTII,S$GLB,, | Performed by: PHYSICIAN ASSISTANT

## 2023-10-26 PROCEDURE — 3078F DIAST BP <80 MM HG: CPT | Mod: CPTII,S$GLB,, | Performed by: PHYSICIAN ASSISTANT

## 2023-10-26 PROCEDURE — 99999 PR PBB SHADOW E&M-EST. PATIENT-LVL IV: ICD-10-PCS | Mod: PBBFAC,,, | Performed by: PHYSICIAN ASSISTANT

## 2023-10-26 PROCEDURE — 82785 ASSAY OF IGE: CPT | Performed by: PHYSICIAN ASSISTANT

## 2023-10-26 PROCEDURE — 99204 OFFICE O/P NEW MOD 45 MIN: CPT | Mod: 25,S$GLB,, | Performed by: PHYSICIAN ASSISTANT

## 2023-10-26 PROCEDURE — 31575 LARYNGOSCOPY: ICD-10-PCS | Mod: S$GLB,,, | Performed by: PHYSICIAN ASSISTANT

## 2023-10-26 RX ORDER — AZELASTINE 1 MG/ML
1 SPRAY, METERED NASAL 2 TIMES DAILY
Qty: 15 ML | Refills: 2 | Status: SHIPPED | OUTPATIENT
Start: 2023-10-26 | End: 2024-04-07

## 2023-10-26 NOTE — PROCEDURES
"Laryngoscopy    Date/Time: 10/26/2023 11:00 AM    Performed by: Fernandez Alexis PA-C  Authorized by: Fernandez Alexis PA-C    Time out: Immediately prior to procedure a "time out" was called to verify the correct patient, procedure, equipment, support staff and site/side marked as required.    Consent Done?:  Yes (Verbal)  Anesthesia:     Local anesthetic:  Lidocaine 4% and Sergio-Synephrine 1/2%    Patient tolerance:  Patient tolerated the procedure well with no immediate complications  Laryngoscopy:     Areas examined:  Nasal cavities, nasopharynx, oropharynx, hypopharynx, larynx and vocal cords    Laryngoscope size:  4 mm  Nose Intranasal:      Mucosa no polyps     Mucosa ulcers not present     No mucosa lesions present     Septum gross deformity (mild L deviation)     Enlarged turbinates  Nasopharynx:      No mucosa lesions     Adenoids present     Posterior choanae patent     Eustachian tube patent  Larynx/hypopharynx:      No epiglottis lesions     No epiglottis edema     No AE folds lesions     No vocal cord polyps     Equal and normal bilateral     No hypopharynx lesions     No piriform sinus pooling     No piriform sinus lesions     Post cricoid edema     No post cricoid erythema     Pale, boggy ITs  Mild clear mucous BL in PC    "

## 2023-10-26 NOTE — PATIENT INSTRUCTIONS
[facility] Sinus rinse   Try purchasing this nasal rinse below.  Its over the counter and can use several times daily without any side effects, but please use at least 1-2 times daily.  Use it before applying your nasal spray medications.  This spray can help wash away mucus and crusting and allow for better absorption of the medications.  Please use the nasal saline spray about 15 minutes before applying your medicated nasal sprays. This bottle uses distilled water (NOT tap water).  The Instructions in the box are detailed so please read them before using.          Astelin (Azelastine)- 1 spray each nostril twice daily  This is a nasal spray that primarily treats nasal drainage/runny nose (rhinorrhea) and nasal itching.    This works fairly quickly after onset and can be used as needed (does not have to be used as a scheduled medication).  Use as directed, up to 2 times daily.    Nasal Steroid Spray (Flonase, Nasacort)- 1 spray each nostril twice daily  You have been prescribed or instructed to take a nasal steroid spray (Flonase). Some symptoms will experience relief within a few days; however, it may take 2-3 weeks to begin to see improvement. This medication needs to be taken consistently to see results.    Use as directed and please be aware the Flonase takes 7-10 days of consistent use before becoming effective- so try to be patient :)    Helpful hints for maximizing nasal spray medication into the nose  - Use the opposite hand to spray the nostril (example: right hand for left nostril). This will help avoid spraying the medication onto the septum (the area that divides the left and right nasal cavity.)  - Tilt the bottle so that it is facing at a slight angle up or straight back, but avoid pointing the bottle straight up while spraying.   - Gently sniff (do not snort) a few seconds after spraying.     LARYNGOPHARYNGEAL REFLUX  (SILENT OR ATYPICAL REFLUX)    LPR is a very challenging disease as it includes a vast  "range of symptoms and difficult to diagnose.      SYMPTOMS  If you have any of the following symptoms you MAY have laryngopharyngeal reflux (LPR):    1. Hoarseness  2. Sore throat  4. throat clearing, sometimes clearing thick mucous  5. chronic cough, especially cough that wakes you up from sleep  6.  "postnasal drip" without the need to blow your nose  7. Globus sensation, like the sensation of something being stuck in the throat  8.  Red, swollen or irritated larynx (voice box)  Many people with LPR do not have symptoms of heartburn. Compared to the esophagus, the voice box and the back of the throat are significantly more sensitive to the effects of acid and digestive enzymes on surrounding tissue. Acid passing quickly through the esophagus does not have a chance to irritate the area for too long.  However acid that pools in the throat or voice box can cause prolonged irritation resulting in the symptoms of LPR.    HOW  There are muscles (esophageal sphincters) at both ends of the tube that carries food to your stomach (the esophagus). These muscles relax to let food pass. Then they tighten to keep stomach acid down. When the lower esophageal sphincter (LES) doesn't tighten enough, acid can flow back (reflux) from your stomach into your esophagus. This may cause heartburn. In some cases the upper esophageal sphincter (UES) also doesn't work well. Then acid can travel higher and enter your throat (pharynx). In many cases, this causes throat symptoms.    DIAGNOSIS  A common procedure performed by an ENT is called a direct Laryngoscope.  A thin tube is inserted through the nose in order to visualize the larynx (the voice box). This method can detect abnormalities in the voice box ranging from polyps to laryngeal cancer.  This can also reveal signs of LPR, but is not 100% reliable.      If symptoms persist despite medical treatment listed below, further testing is needed.  Three commonly used tests are: a swallowing " study; a direct look at the stomach and esophagus through an endoscope, and; an esophageal pH test.  Further testing is usually coordinated with a gastroenterologist.     TREATMENT  Lifestyle changes  1. Do not smoke.  Smoking will worsen reflux.    2. Avoid eating at least 2-3 hours prior to bedtime.  Try to avoid very large meals at night.    4. Weight loss.  For patient's with recent weight gain, shedding a few pounds is all that is required to improve reflux.    5. Avoid reflux triggers. These can worsen acid in the stomach or even cause the esophagus muscles to relax: caffeine, soft drinks, acidic foods (tomato, lots of fruit) mints, alcoholic beverages, particularly at night, cheese, fried foods, spicy foods, eggs, and chocolate.    6. Sleep with the head of bed elevated at least 6 inches.    Medications  Take over-the-counter (OTC) medications, including antacids, such as Gaviscon Advance (others include Tums®, Maalox®, or Mylanta)  Prescription and OTC stomach acid reducers, such as ranitidine (Tagamet® or Zantac®) OR proton pump inhibitors (PPIs), such as omeprazole (Prilosec®), pantoprazole (Protonix®), and esomeprazole (Nexium®).  Most patients will begin to notice some relief in their symptoms about 2-4 weeks after starting an acid reducing medication; however it is generally recommended the medication should be continued for at least 2 months. If the symptoms completely resolve, the medication can then be tapered.  Some people will remain symptom free while others may have relapses which required treatment again.      Other Treatments:  All natural remedies include Reflux Gourmet (and Reflux Raft) which create a temporary protective barrier in your stomach to prevent reflux into your esophagus. This can be an effective therapy without the side effects of PPIs and was also developed by laryngologists (LPR specialists).   Speech Therapy: education and techniques aimed at helping you control the cough

## 2023-10-26 NOTE — PROGRESS NOTES
Subjective:     HPI: Cloie Bodden Reyes is a 62 y.o. female who was referred to me by Blade Lott in consultation for sinusitis.    Patient reports daily, perennial rhinorrhea with associated sneezing, watery/itchy eyes, and postnasal drip.  Patient has been using Nasacort daily with no significant improvement in symptoms.  Patient reports a recent evaluation in July in the ED with left facial pain and above symptoms.  Patient underwent a CT head and diagnosed with tension headache.  Patient was prescribed Augmentin which only resolved L facial pressure.  Now patient with R facial pressure.  Seen by PCP 10/23/23 Dx with recurrent sinusitis and Rx Medrol dosepak. Patient has not taken the medications.     Current sinonasal medications as above.  The last course of antibiotics was recently.    She does not recall previously having allergy testing.  She denies a history of asthma.  She relates a history of reflux symptoms but resolved after GI surgery (cholecystectomy?)     She denies a diagnosis of obstructive sleep apnea.   She does not recall a prior history of nasal trauma.  She has not had sinonasal surgery.    She has not had a tonsillectomy.  She is not a tobacco smoker.     Past Medical/Past Surgical History  Past Medical History:   Diagnosis Date    Allergy     Fibrocystic breast     History of cholecystectomy      She has a past surgical history that includes Hernia repair; Vaginal delivery; Cholecystectomy; Biopsy (10/28/2020); and Breast biopsy (Right, 10/28/2020).    Family History/Social History  Her family history includes Cancer in her mother; Clotting disorder in her son; Diabetes in her brother, father, and sister; Endometrial cancer in her maternal grandmother and mother; No Known Problems in her sister and son; Stroke in her brother.  She reports that she has never smoked. She has never used smokeless tobacco. She reports that she does not drink alcohol and does not use  drugs.    Allergies/Immunizations  She is allergic to ibuprofen.  Immunization History   Administered Date(s) Administered    COVID-19, MRNA, LN-S, PF (Pfizer) (Purple Cap) 04/12/2021, 05/05/2021    Influenza 11/14/2005    Influenza - Trivalent (ADULT) 11/14/2005        Medications   acetaminophen  aspirin  diphenhydrAMINE  methylPREDNISolone  polidocanoL Soln  triamcinolone     Review of Systems   HENT: Positive for postnasal drip, runny nose, sinus pressure and stuffy nose.  Negative for trouble swallowing and voice change.      Eyes:  Positive for eye drainage and eye itching.     Respiratory:  Positive for cough. Negative for shortness of breath and wheezing.      Gastrointestinal:  Negative for acid reflux.     Allergy: Positive for seasonal allergies.           Objective:     /73 (BP Location: Right arm, Patient Position: Sitting)   Pulse 62   Wt 78.7 kg (173 lb 8 oz)   BMI 27.17 kg/m²        Constitutional:   She appears well-developed and well-nourished. Normal speech.      Head:  Normocephalic and atraumatic. Head is with TMJ tenderness (L). Facial strength is normal.      Ears:    Right Ear: No drainage or swelling. Tympanic membrane is not perforated and not erythematous. No middle ear effusion.   Left Ear: No drainage or swelling. Tympanic membrane is not perforated and not erythematous.  No middle ear effusion.     Nose:  No mucosal edema, rhinorrhea, septal deviation or polyps.  No foreign bodies. Turbinate hypertrophy (cyanotic apperance to ITs).  Turbinates normal and no turbinate masses.  Right sinus exhibits no maxillary sinus tenderness and no frontal sinus tenderness. Left sinus exhibits no maxillary sinus tenderness and no frontal sinus tenderness.     Mouth/Throat  Oropharynx clear and moist without lesions or asymmetry, normal uvula midline and lips, teeth, and gums normal. No trismus or mucous membrane lesions. No oropharyngeal exudate, posterior oropharyngeal edema or posterior  "oropharyngeal erythema. Tonsils present, +1.      Neck:  Phonation normal. Thyroid tenderness is present. No thyroid mass and no thyromegaly present.     She has no cervical adenopathy.     Pulmonary/Chest:   Effort normal.     Psychiatric:   She has a normal mood and affect. Her speech is normal and behavior is normal.       Procedure    Flexible laryngoscopy performed.  See procedure note.     L NV     L MT     L PC with clear mucous     R IT edematous, pale     R MT     R PC     Hypopharynx/larynx with LPR changes     VF mobility      Data Reviewed  I personally reviewed the chart, including any outside records, and pertinent data below:    I reviewed the following notes: ED, primary care    WBC (K/uL)   Date Value   06/27/2023 3.77 (L)     Eosinophil % (%)   Date Value   06/27/2023 2.4     Eos # (K/uL)   Date Value   06/27/2023 0.1     Platelets (K/uL)   Date Value   06/27/2023 194     Glucose (mg/dL)   Date Value   06/27/2023 86     No results found for: "IGE"    I independently reviewed the images of the CT head dated 7/17/23. Pertinent findings include L mid sepal spur, overall patent sinuses    Assessment & Plan:     1. Chronic cough  2. Chronic rhinitis  3. Hypertrophy of inferior nasal turbinate  4. Deviated nasal septum  5. Laryngopharyngeal reflux (LPR)   - Suspect chronic (?allergic) rhinitis + LPR contributing to symptoms   - Advised daily saline rinse   - START azelastine   - CONTINUE nasacort   - checking inhalant immunocaps  6. Arthritis of left temporomandibular joint   - improved    She will Follow up in about 1 month (around 11/26/2023) for re-evaluate post treatment, test results.  I had a discussion with the patient regarding her condition and the further workup and management options.    All questions were answered, and the patient is in agreement with the above.     Disclaimer:  This note may have been prepared utilizing voice recognition software which may result in occasional typographical " errors in the text such as sound alike words.   If further clarification is needed, please contact the ENT department of [facility].

## 2023-10-31 LAB
A ALTERNATA IGE QN: <0.1 KU/L
A FUMIGATUS IGE QN: <0.1 KU/L
BERMUDA GRASS IGE QN: <0.1 KU/L
CAT DANDER IGE QN: 0.17 KU/L
CEDAR IGE QN: <0.1 KU/L
D FARINAE IGE QN: 8.78 KU/L
D PTERONYSS IGE QN: 5.33 KU/L
DEPRECATED A ALTERNATA IGE RAST QL: NORMAL
DEPRECATED A FUMIGATUS IGE RAST QL: NORMAL
DEPRECATED BERMUDA GRASS IGE RAST QL: NORMAL
DEPRECATED CAT DANDER IGE RAST QL: ABNORMAL
DEPRECATED CEDAR IGE RAST QL: NORMAL
DEPRECATED D FARINAE IGE RAST QL: ABNORMAL
DEPRECATED D PTERONYSS IGE RAST QL: ABNORMAL
DEPRECATED DOG DANDER IGE RAST QL: ABNORMAL
DEPRECATED ELDER IGE RAST QL: NORMAL
DEPRECATED ENGL PLANTAIN IGE RAST QL: NORMAL
DEPRECATED PECAN/HICK TREE IGE RAST QL: NORMAL
DEPRECATED ROACH IGE RAST QL: ABNORMAL
DEPRECATED TIMOTHY IGE RAST QL: NORMAL
DEPRECATED WEST RAGWEED IGE RAST QL: ABNORMAL
DEPRECATED WHITE OAK IGE RAST QL: NORMAL
DOG DANDER IGE QN: 0.51 KU/L
ELDER IGE QN: <0.1 KU/L
ENGL PLANTAIN IGE QN: <0.1 KU/L
PECAN/HICK TREE IGE QN: <0.1 KU/L
ROACH IGE QN: 0.12 KU/L
TIMOTHY IGE QN: <0.1 KU/L
WEST RAGWEED IGE QN: 0.11 KU/L
WHITE OAK IGE QN: <0.1 KU/L

## 2023-11-27 ENCOUNTER — OFFICE VISIT (OUTPATIENT)
Dept: OTOLARYNGOLOGY | Facility: CLINIC | Age: 63
End: 2023-11-27
Payer: COMMERCIAL

## 2023-11-27 VITALS
SYSTOLIC BLOOD PRESSURE: 119 MMHG | HEART RATE: 64 BPM | WEIGHT: 175.25 LBS | DIASTOLIC BLOOD PRESSURE: 70 MMHG | BODY MASS INDEX: 27.45 KG/M2

## 2023-11-27 DIAGNOSIS — J34.3 HYPERTROPHY OF INFERIOR NASAL TURBINATE: ICD-10-CM

## 2023-11-27 DIAGNOSIS — J34.2 DEVIATED NASAL SEPTUM: ICD-10-CM

## 2023-11-27 DIAGNOSIS — K21.9 LARYNGOPHARYNGEAL REFLUX (LPR): ICD-10-CM

## 2023-11-27 DIAGNOSIS — R05.3 CHRONIC COUGH: Primary | ICD-10-CM

## 2023-11-27 DIAGNOSIS — J31.0 CHRONIC RHINITIS: ICD-10-CM

## 2023-11-27 PROCEDURE — 3078F DIAST BP <80 MM HG: CPT | Mod: CPTII,S$GLB,, | Performed by: PHYSICIAN ASSISTANT

## 2023-11-27 PROCEDURE — 1159F MED LIST DOCD IN RCRD: CPT | Mod: CPTII,S$GLB,, | Performed by: PHYSICIAN ASSISTANT

## 2023-11-27 PROCEDURE — 99213 OFFICE O/P EST LOW 20 MIN: CPT | Mod: S$GLB,,, | Performed by: PHYSICIAN ASSISTANT

## 2023-11-27 PROCEDURE — 3008F PR BODY MASS INDEX (BMI) DOCUMENTED: ICD-10-PCS | Mod: CPTII,S$GLB,, | Performed by: PHYSICIAN ASSISTANT

## 2023-11-27 PROCEDURE — 99213 PR OFFICE/OUTPT VISIT, EST, LEVL III, 20-29 MIN: ICD-10-PCS | Mod: S$GLB,,, | Performed by: PHYSICIAN ASSISTANT

## 2023-11-27 PROCEDURE — 3074F SYST BP LT 130 MM HG: CPT | Mod: CPTII,S$GLB,, | Performed by: PHYSICIAN ASSISTANT

## 2023-11-27 PROCEDURE — 3008F BODY MASS INDEX DOCD: CPT | Mod: CPTII,S$GLB,, | Performed by: PHYSICIAN ASSISTANT

## 2023-11-27 PROCEDURE — 99999 PR PBB SHADOW E&M-EST. PATIENT-LVL IV: ICD-10-PCS | Mod: PBBFAC,,, | Performed by: PHYSICIAN ASSISTANT

## 2023-11-27 PROCEDURE — 3078F PR MOST RECENT DIASTOLIC BLOOD PRESSURE < 80 MM HG: ICD-10-PCS | Mod: CPTII,S$GLB,, | Performed by: PHYSICIAN ASSISTANT

## 2023-11-27 PROCEDURE — 3074F PR MOST RECENT SYSTOLIC BLOOD PRESSURE < 130 MM HG: ICD-10-PCS | Mod: CPTII,S$GLB,, | Performed by: PHYSICIAN ASSISTANT

## 2023-11-27 PROCEDURE — 3044F PR MOST RECENT HEMOGLOBIN A1C LEVEL <7.0%: ICD-10-PCS | Mod: CPTII,S$GLB,, | Performed by: PHYSICIAN ASSISTANT

## 2023-11-27 PROCEDURE — 1159F PR MEDICATION LIST DOCUMENTED IN MEDICAL RECORD: ICD-10-PCS | Mod: CPTII,S$GLB,, | Performed by: PHYSICIAN ASSISTANT

## 2023-11-27 PROCEDURE — 3044F HG A1C LEVEL LT 7.0%: CPT | Mod: CPTII,S$GLB,, | Performed by: PHYSICIAN ASSISTANT

## 2023-11-27 PROCEDURE — 99999 PR PBB SHADOW E&M-EST. PATIENT-LVL IV: CPT | Mod: PBBFAC,,, | Performed by: PHYSICIAN ASSISTANT

## 2023-11-27 NOTE — PROGRESS NOTES
Subjective:      Ciro is a 63 y.o. female with bradycardia who comes for follow-up of rhinitis.  Her last visit with me was on 10/26/2023.  Aeroallergen testing positive for dustmites- started on azelastine.    Since last visit, patient has not started the azelastine nasal spray.  Patient read up on side effects and did not want to affect her heart.  Patient reports a persistent postnasal drip/globus sensation with mucus production and throat clearing.  Patient reports trouble swallowing.  On further questioning, patient states food will go down appropriately but seems to take time getting to esophagus/stomach.  No cough/aspiration reported.     Per last office visit 10/26/2023 :  Patient reports daily, perennial rhinorrhea with associated sneezing, watery/itchy eyes, and postnasal drip.  Patient has been using Nasacort daily with no significant improvement in symptoms.  Patient reports a recent evaluation in July in the ED with left facial pain and above symptoms.  Patient underwent a CT head and diagnosed with tension headache.  Patient was prescribed Augmentin which only resolved L facial pressure.  Now patient with R facial pressure.  Seen by PCP 10/23/23 Dx with recurrent sinusitis and Rx Medrol dosepak. Patient has not taken the medications.      Current sinonasal medications as above.  The last course of antibiotics was recently.    She does not recall previously having allergy testing.  She denies a history of asthma.  She relates a history of reflux symptoms but resolved after GI surgery (cholecystectomy?)     She denies a diagnosis of obstructive sleep apnea.   She does not recall a prior history of nasal trauma.  She has not had sinonasal surgery.    She has not had a tonsillectomy.  She is not a tobacco smoker.     1. Chronic cough  2. Chronic rhinitis  3. Hypertrophy of inferior nasal turbinate  4. Deviated nasal septum  5. Laryngopharyngeal reflux (LPR)              - Suspect chronic (?allergic)  rhinitis + LPR contributing to symptoms              - Advised daily saline rinse              - START azelastine              - CONTINUE nasacort              - checking inhalant immunocaps  6. Arthritis of left temporomandibular joint              - improved     The patient's medications, allergies, past medical, surgical, social and family histories were reviewed and updated as appropriate.    A detailed review of systems was obtained with pertinent positives as per the above HPI, and otherwise negative.        Objective:     /70 (BP Location: Right arm, Patient Position: Sitting)   Pulse 64   Wt 79.5 kg (175 lb 4.3 oz)   BMI 27.45 kg/m²        Constitutional:   She appears well-developed and well-nourished. She is active. Normal speech.      Head:  Normocephalic and atraumatic.     Nose:  Rhinorrhea (L) present. No mucosal edema. Turbinate hypertrophy.      Pulmonary/Chest:   Effort normal.     Psychiatric:   She has a normal mood and affect. Her speech is normal and behavior is normal.        Procedure    None    Data Reviewed    WBC (K/uL)   Date Value   06/27/2023 3.77 (L)     Eosinophil % (%)   Date Value   06/27/2023 2.4     Eos # (K/uL)   Date Value   06/27/2023 0.1     Platelets (K/uL)   Date Value   06/27/2023 194     Glucose (mg/dL)   Date Value   06/27/2023 86     IgE (IU/mL)   Date Value   10/26/2023 54       Assessment:     1. Chronic cough    2. Chronic rhinitis    3. Hypertrophy of inferior nasal turbinate    4. Deviated nasal septum    5. Laryngopharyngeal reflux (LPR)         Plan:     - Discussed the etiology of LPR and management strategies including nonpharmacologic treatments: eating smaller meals, eating at least 3 hours before bed, elevation of the head of bed at night, avoidance of caffeine, chocolate, nicotine and peppermint, and avoiding tight fitting clothing.    - Referred to GI with h/o esophageal surgery and swallowing symptoms    Start azelastine.  Discussed side effect  profile with patient.     She will Follow up if symptoms worsen or fail to improve.  I had a discussion with the patient regarding her condition and the further workup and management options.    All questions were answered, and the patient is in agreement with the above.     Disclaimer:  This note may have been prepared utilizing voice recognition software which may result in occasional typographical errors in the text such as sound alike words.   If further clarification is needed, please contact the ENT department of [facility].

## 2023-11-27 NOTE — PATIENT INSTRUCTIONS
"Astelin (Azelastine) use one spray each nostril twice daily  This is a nasal spray that primarily treats nasal drainage/runny nose (rhinorrhea) and nasal itching.    This works fairly quickly after onset and can be used as needed (does not have to be used as a scheduled medication).  Use as directed, up to 2 times daily.    Helpful hints for maximizing nasal spray medication into the nose  - Use the opposite hand to spray the nostril (example: right hand for left nostril). This will help avoid spraying the medication onto the septum (the area that divides the left and right nasal cavity.)  - Tilt the bottle so that it is facing at a slight angle up or straight back, but avoid pointing the bottle straight up while spraying.   - Gently sniff (do not snort) a few seconds after spraying.     LARYNGOPHARYNGEAL REFLUX  (SILENT OR ATYPICAL REFLUX)    LPR is a very challenging disease as it includes a vast range of symptoms and difficult to diagnose.      SYMPTOMS  If you have any of the following symptoms you MAY have laryngopharyngeal reflux (LPR):    1. Hoarseness  2. Sore throat  4. throat clearing, sometimes clearing thick mucous  5. chronic cough, especially cough that wakes you up from sleep  6.  "postnasal drip" without the need to blow your nose  7. Globus sensation, like the sensation of something being stuck in the throat  8.  Red, swollen or irritated larynx (voice box)    HOW  Many people with LPR do not have symptoms of heartburn. Compared to the esophagus, the voice box and the back of the throat are significantly more sensitive to the effects of acid and digestive enzymes on surrounding tissue. Acid passing quickly through the esophagus does not have a chance to irritate the area for too long.  However acid that pools in the throat or voice box can cause prolonged irritation resulting in the symptoms of LPR.    DIAGNOSIS  A common procedure performed by an ENT is called flexible laryngoscopy.  A thin tube is " inserted through the nose in order to visualize the larynx (the voice box). This method can detect abnormalities in the voice box ranging from polyps to laryngeal cancer.  This can also reveal signs of LPR, but is not 100% reliable.      If symptoms persist despite medical treatment listed below, further testing is needed.  Three commonly used tests are: a swallowing study; a direct look at the stomach and esophagus through an endoscope, and; an esophageal pH test.  Further testing is usually coordinated with a gastroenterologist.     TREATMENT  Lifestyle changes  1. Do not smoke.  Smoking will worsen reflux.    2. Avoid eating at least 2-3 hours prior to bedtime.  Try to avoid very large meals at night.    4. Weight loss.  For patient's with recent weight gain, shedding a few pounds is all that is required to improve reflux.    5. Avoid reflux triggers. These can worsen acid in the stomach or even cause the esophagus muscles to relax: caffeine, soft drinks, acidic foods (tomato, lots of fruit) mints, alcoholic beverages, particularly at night, cheese, fried foods, spicy foods, eggs, and chocolate.    6. Sleep with the head of bed elevated at least 6 inches.    Consider reading the book Dropping Acid by eHron Chawla MD.  This has information to help choose meals with less acid.     Other Treatments:  All natural remedies include Reflux Gourmet (and Reflux Raft) which create a temporary protective barrier in your stomach to prevent reflux into your esophagus. This can be an effective therapy without the side effects of normal acid reducing medications and was also developed by laryngologists (LPR specialists).   Speech Therapy: education and techniques aimed at helping you control the cough     Medications  Take over-the-counter (OTC) medications, including antacids, such as Gaviscon Advance (others include Tums®, Maalox®, or Mylanta) as needed  Prescription and OTC stomach acid reducers: H2 blockers such as  famotidine (Pepcid®), cimetidine (Tagamet®), ranitidine (Zantac®) OR proton pump inhibitors (PPIs), such as omeprazole (Prilosec®), pantoprazole (Protonix®), and esomeprazole (Nexium®).  Most patients will begin to notice some relief in their symptoms about 2-4 weeks after starting an acid reducing medication; however it is generally recommended the medication should be continued for at least 2 months. If the symptoms completely resolve, the medication can then be tapered.  Some people will remain symptom free while others may have relapses which required treatment again.

## 2023-12-01 ENCOUNTER — HOSPITAL ENCOUNTER (OUTPATIENT)
Dept: CARDIOLOGY | Facility: CLINIC | Age: 63
Discharge: HOME | End: 2023-12-01
Payer: COMMERCIAL

## 2023-12-01 ENCOUNTER — OFFICE VISIT (OUTPATIENT)
Dept: CARDIOLOGY | Facility: CLINIC | Age: 63
End: 2023-12-01
Payer: COMMERCIAL

## 2023-12-01 VITALS
WEIGHT: 177.69 LBS | HEART RATE: 67 BPM | OXYGEN SATURATION: 100 % | BODY MASS INDEX: 27.89 KG/M2 | DIASTOLIC BLOOD PRESSURE: 73 MMHG | HEIGHT: 67 IN | SYSTOLIC BLOOD PRESSURE: 125 MMHG

## 2023-12-01 DIAGNOSIS — Z86.718 HISTORY OF DVT (DEEP VEIN THROMBOSIS): ICD-10-CM

## 2023-12-01 DIAGNOSIS — R94.31 NONSPECIFIC ABNORMAL ELECTROCARDIOGRAM (ECG) (EKG): Primary | ICD-10-CM

## 2023-12-01 DIAGNOSIS — R53.83 FATIGUE, UNSPECIFIED TYPE: Primary | ICD-10-CM

## 2023-12-01 DIAGNOSIS — R94.31 NONSPECIFIC ABNORMAL ELECTROCARDIOGRAM (ECG) (EKG): ICD-10-CM

## 2023-12-01 DIAGNOSIS — R29.818 SUSPECTED SLEEP APNEA: ICD-10-CM

## 2023-12-01 DIAGNOSIS — Z13.6 ENCOUNTER FOR SCREENING FOR CARDIOVASCULAR DISORDERS: ICD-10-CM

## 2023-12-01 PROCEDURE — 93000 EKG 12-LEAD: ICD-10-PCS | Mod: S$GLB,,, | Performed by: INTERNAL MEDICINE

## 2023-12-01 PROCEDURE — 3044F PR MOST RECENT HEMOGLOBIN A1C LEVEL <7.0%: ICD-10-PCS | Mod: CPTII,S$GLB,, | Performed by: PHYSICIAN ASSISTANT

## 2023-12-01 PROCEDURE — 99999 PR PBB SHADOW E&M-EST. PATIENT-LVL V: CPT | Mod: PBBFAC,,, | Performed by: PHYSICIAN ASSISTANT

## 2023-12-01 PROCEDURE — 3044F HG A1C LEVEL LT 7.0%: CPT | Mod: CPTII,S$GLB,, | Performed by: PHYSICIAN ASSISTANT

## 2023-12-01 PROCEDURE — 1159F MED LIST DOCD IN RCRD: CPT | Mod: CPTII,S$GLB,, | Performed by: PHYSICIAN ASSISTANT

## 2023-12-01 PROCEDURE — 3078F PR MOST RECENT DIASTOLIC BLOOD PRESSURE < 80 MM HG: ICD-10-PCS | Mod: CPTII,S$GLB,, | Performed by: PHYSICIAN ASSISTANT

## 2023-12-01 PROCEDURE — 3074F PR MOST RECENT SYSTOLIC BLOOD PRESSURE < 130 MM HG: ICD-10-PCS | Mod: CPTII,S$GLB,, | Performed by: PHYSICIAN ASSISTANT

## 2023-12-01 PROCEDURE — 99999 PR PBB SHADOW E&M-EST. PATIENT-LVL V: ICD-10-PCS | Mod: PBBFAC,,, | Performed by: PHYSICIAN ASSISTANT

## 2023-12-01 PROCEDURE — 3008F BODY MASS INDEX DOCD: CPT | Mod: CPTII,S$GLB,, | Performed by: PHYSICIAN ASSISTANT

## 2023-12-01 PROCEDURE — 3078F DIAST BP <80 MM HG: CPT | Mod: CPTII,S$GLB,, | Performed by: PHYSICIAN ASSISTANT

## 2023-12-01 PROCEDURE — 93000 ELECTROCARDIOGRAM COMPLETE: CPT | Mod: S$GLB,,, | Performed by: INTERNAL MEDICINE

## 2023-12-01 PROCEDURE — 3008F PR BODY MASS INDEX (BMI) DOCUMENTED: ICD-10-PCS | Mod: CPTII,S$GLB,, | Performed by: PHYSICIAN ASSISTANT

## 2023-12-01 PROCEDURE — 99214 PR OFFICE/OUTPT VISIT, EST, LEVL IV, 30-39 MIN: ICD-10-PCS | Mod: S$GLB,,, | Performed by: PHYSICIAN ASSISTANT

## 2023-12-01 PROCEDURE — 1159F PR MEDICATION LIST DOCUMENTED IN MEDICAL RECORD: ICD-10-PCS | Mod: CPTII,S$GLB,, | Performed by: PHYSICIAN ASSISTANT

## 2023-12-01 PROCEDURE — 3074F SYST BP LT 130 MM HG: CPT | Mod: CPTII,S$GLB,, | Performed by: PHYSICIAN ASSISTANT

## 2023-12-01 PROCEDURE — 99214 OFFICE O/P EST MOD 30 MIN: CPT | Mod: S$GLB,,, | Performed by: PHYSICIAN ASSISTANT

## 2023-12-01 NOTE — PROGRESS NOTES
Cardiology Clinic Note  Reason for Visit: Fatigue  LOV w/ Cardiology: 7/31/2023    HPI:     PMHx:  Personal and family history of DVT  Mild AR and TR      Cloie Bodden Reyes is a 63 y.o. F, who presents to clinic for routine follow up. Her main issues is intermittent fatigue for the past 6 months. Basic investigative labs including CBC and TSH have been normal. She denies dizziness or light headedness. She admits intermittent shortness of breath at rest. She walks an hour daily for exercise. She denies exertional dyspnea or chest pain. She reports a RLE DVT two years ago in [place] following a flight. She also reports multiple family members including her son that have had DVTs.       ROS:    Pertinent ROS included in HPI and below.  PMH:     Past Medical History:   Diagnosis Date    Allergy     Fibrocystic breast     History of cholecystectomy      Past Surgical History:   Procedure Laterality Date    BIOPSY  10/28/2020    BREAST BIOPSY Right 10/28/2020    Benign fibrotic breast tissue with fibroadenomatoid change and duct ectasia    CHOLECYSTECTOMY      HERNIA REPAIR      VAGINAL DELIVERY       Allergies:     Review of patient's allergies indicates:   Allergen Reactions    Ibuprofen Other (See Comments)     Leg and hand     Medications:     Current Outpatient Medications on File Prior to Visit   Medication Sig Dispense Refill    acetaminophen (TYLENOL) 500 MG tablet Take 2 tablets every 8 hours by oral route as needed for 14 days.      aspirin (ECOTRIN) 81 MG EC tablet Take 81 mg by mouth once.       azelastine (ASTELIN) 137 mcg (0.1 %) nasal spray 1 spray (137 mcg total) by Nasal route 2 (two) times daily. 15 mL 2    triamcinolone (NASACORT) 55 mcg nasal inhaler 2 sprays by Nasal route once daily.      diphenhydrAMINE (BENADRYL) 25 mg capsule Take 25 mg by mouth every 6 (six) hours as needed for Itching.      loratadine (CLARITIN) 10 mg tablet Take 1 tablet (10 mg total) by mouth daily as needed. 30 tablet  "0     Current Facility-Administered Medications on File Prior to Visit   Medication Dose Route Frequency Provider Last Rate Last Admin    polidocanoL 1 % (20 mg/2 mL) injection Soln 0.2 mL  0.2 mL Intravenous 1 time in Clinic/HOD Kyler Daly MD         Social History:     Social History     Tobacco Use    Smoking status: Never    Smokeless tobacco: Never   Substance Use Topics    Alcohol use: No     Alcohol/week: 0.0 standard drinks of alcohol     Family History:     Family History   Problem Relation Age of Onset    Cancer Mother         kidney    Endometrial cancer Mother     Diabetes Father     Diabetes Sister     Stroke Brother     Clotting disorder Son     Diabetes Brother     No Known Problems Sister     No Known Problems Son     Endometrial cancer Maternal Grandmother     Amblyopia Neg Hx     Blindness Neg Hx     Cataracts Neg Hx     Glaucoma Neg Hx     Macular degeneration Neg Hx     Retinal detachment Neg Hx     Strabismus Neg Hx      Physical Exam:   /73   Pulse 67   Ht 5' 7" (1.702 m)   Wt 80.6 kg (177 lb 11.1 oz)   SpO2 100%   BMI 27.83 kg/m²      Physical Exam  Vitals and nursing note reviewed.   Constitutional:       Appearance: Normal appearance.   HENT:      Head: Normocephalic and atraumatic.   Neck:      Vascular: Normal carotid pulses. No carotid bruit or hepatojugular reflux.   Cardiovascular:      Rate and Rhythm: Normal rate and regular rhythm.      Chest Wall: PMI is not displaced.      Pulses:           Radial pulses are 2+ on the right side and 2+ on the left side.        Dorsalis pedis pulses are 2+ on the right side and 2+ on the left side.        Posterior tibial pulses are 2+ on the right side and 2+ on the left side.      Heart sounds: No murmur heard.  Pulmonary:      Effort: Pulmonary effort is normal.      Breath sounds: Normal breath sounds. No wheezing, rhonchi or rales.   Abdominal:      General: Bowel sounds are normal. There is no abdominal bruit.      " Palpations: Abdomen is soft. There is no pulsatile mass.      Tenderness: There is no abdominal tenderness.   Feet:      Right foot:      Skin integrity: Skin integrity normal.      Left foot:      Skin integrity: Skin integrity normal.   Skin:     Capillary Refill: Capillary refill takes less than 2 seconds.   Neurological:      General: No focal deficit present.      Mental Status: She is alert.   Psychiatric:         Mood and Affect: Mood and affect normal.         Speech: Speech normal.         Behavior: Behavior is cooperative.         Thought Content: Thought content normal.          Labs:     Blood Tests:  Lab Results   Component Value Date    BNP 28 06/27/2023     06/27/2023    K 4.6 06/27/2023     06/27/2023    CO2 28 06/27/2023    BUN 9 06/27/2023    CREATININE 0.7 06/27/2023    GLU 86 06/27/2023    HGBA1C 5.6 06/06/2023    AST 18 06/27/2023    ALT 16 06/27/2023    ALBUMIN 4.0 06/27/2023    PROT 7.3 06/27/2023    BILITOT 0.4 06/27/2023    WBC 3.77 (L) 06/27/2023    HGB 13.7 06/27/2023    HCT 42 06/27/2023    HCT 42.1 06/27/2023    MCV 83 06/27/2023     06/27/2023    TSH 1.460 06/06/2023       Lab Results   Component Value Date    CHOL 206 (H) 10/31/2022    HDL 77 (H) 10/31/2022    TRIG 69 10/31/2022       Lab Results   Component Value Date    LDLCALC 115.2 10/31/2022         Imaging:     Echocardiogram  TTE 6/22/2023  The left ventricle is normal in size with concentric remodeling and normal systolic function.  The estimated ejection fraction is 55%.  Normal right ventricular size with normal right ventricular systolic function.  Mild aortic regurgitation.  Mild tricuspid regurgitation.  The estimated PA systolic pressure is 24 mmHg.    Stress testing  None    Cath Lab  None    Other  24 hour holter monitor 6/23/2023  Nsr. Heart rates varied between 50 and 111 BPM with an average of 64 BPM.  There were very rare PVCs totalling 5  There were very rare PACs totalling 13    EKG:    12/1/2023: NSR (personally reviewed)    Assessment:     1. Fatigue, unspecified type    2. Suspected sleep apnea    3. History of DVT (deep vein thrombosis)    4. Encounter for screening for cardiovascular disorders    5. BMI 27.0-27.9,adult        Plan:     Fatigue, unspecified type  Recommend ruling out GAIL and follow up with PCP     Suspected sleep apnea  -     Ambulatory referral/consult to Sleep Disorders; Future; Expected date: 12/08/2023    History of DVT (deep vein thrombosis)  -     Ambulatory referral/consult to Hematology / Oncology; Future; Expected date: 12/08/2023    Encounter for screening for cardiovascular disorders  -     CT Cardiac Scoring; Future; Expected date: 12/01/2023  Last    The 10-year ASCVD risk score (Janette DK, et al., 2019) is: 4.2%    Values used to calculate the score:      Age: 63 years      Sex: Female      Is Non- : No      Diabetic: No      Tobacco smoker: No      Systolic Blood Pressure: 125 mmHg      Is BP treated: No      HDL Cholesterol: 77 mg/dL      Total Cholesterol: 206 mg/dL    BMI 27.0-27.9,adult  Weight loss through a combination of diet and exercise encouraged  Recommend 150 minutes a week (30 minutes per day, 5 days per week) of moderate intensity exercise        Signed:  Cherelle Moody PA-C  Cardiology     12/1/2023 9:08 AM    Follow-up:     Future Appointments   Date Time Provider Department Center   12/7/2023 11:20 AM Sienna Min NP [facility] GASTR[facility]   12/14/2023  9:30 AM [facility] OI-CT2 500 LB LIMIT [facility]   4/10/2024  9:30 AM Evin Martins MD [facility] PULSM [facility]   6/14/2024  7:45 AM LAB, [facility] [place]   6/20/2024  8:20 AM Vivi Stiles MD [facility] [place]

## 2023-12-07 ENCOUNTER — OFFICE VISIT (OUTPATIENT)
Dept: GASTROENTEROLOGY | Facility: CLINIC | Age: 63
End: 2023-12-07
Payer: COMMERCIAL

## 2023-12-07 VITALS — WEIGHT: 177.25 LBS | BODY MASS INDEX: 27.82 KG/M2 | HEIGHT: 67 IN

## 2023-12-07 DIAGNOSIS — R13.10 DYSPHAGIA, UNSPECIFIED TYPE: Primary | ICD-10-CM

## 2023-12-07 DIAGNOSIS — Z87.19 HISTORY OF HIATAL HERNIA: ICD-10-CM

## 2023-12-07 DIAGNOSIS — K21.9 LARYNGOPHARYNGEAL REFLUX (LPR): ICD-10-CM

## 2023-12-07 PROCEDURE — 3008F PR BODY MASS INDEX (BMI) DOCUMENTED: ICD-10-PCS | Mod: CPTII,S$GLB,, | Performed by: NURSE PRACTITIONER

## 2023-12-07 PROCEDURE — 99999 PR PBB SHADOW E&M-EST. PATIENT-LVL IV: CPT | Mod: PBBFAC,,, | Performed by: NURSE PRACTITIONER

## 2023-12-07 PROCEDURE — 1159F PR MEDICATION LIST DOCUMENTED IN MEDICAL RECORD: ICD-10-PCS | Mod: CPTII,S$GLB,, | Performed by: NURSE PRACTITIONER

## 2023-12-07 PROCEDURE — 3044F PR MOST RECENT HEMOGLOBIN A1C LEVEL <7.0%: ICD-10-PCS | Mod: CPTII,S$GLB,, | Performed by: NURSE PRACTITIONER

## 2023-12-07 PROCEDURE — 99214 PR OFFICE/OUTPT VISIT, EST, LEVL IV, 30-39 MIN: ICD-10-PCS | Mod: S$GLB,,, | Performed by: NURSE PRACTITIONER

## 2023-12-07 PROCEDURE — 99999 PR PBB SHADOW E&M-EST. PATIENT-LVL IV: ICD-10-PCS | Mod: PBBFAC,,, | Performed by: NURSE PRACTITIONER

## 2023-12-07 PROCEDURE — 3044F HG A1C LEVEL LT 7.0%: CPT | Mod: CPTII,S$GLB,, | Performed by: NURSE PRACTITIONER

## 2023-12-07 PROCEDURE — 99214 OFFICE O/P EST MOD 30 MIN: CPT | Mod: S$GLB,,, | Performed by: NURSE PRACTITIONER

## 2023-12-07 PROCEDURE — 1159F MED LIST DOCD IN RCRD: CPT | Mod: CPTII,S$GLB,, | Performed by: NURSE PRACTITIONER

## 2023-12-07 PROCEDURE — 3008F BODY MASS INDEX DOCD: CPT | Mod: CPTII,S$GLB,, | Performed by: NURSE PRACTITIONER

## 2023-12-07 NOTE — PROGRESS NOTES
GASTROENTEROLOGY CLINIC NOTE    Chief Complaint: The primary encounter diagnosis was Dysphagia, unspecified type. Diagnoses of Laryngopharyngeal reflux (LPR) and History of hiatal hernia were also pertinent to this visit.  Referring provider/PCP: Vivi Stiles MD    Cloie Bodden Reyes is a 63 y.o. female who is a new patient to me with a PMH that's significant for hiatal hernia, PND, and periodontal disease  She was referred to us by ENT is here today to establish care for LPR and dysphagia.   Has h/o hiatal hernia repair about 10-15 years ago. Reflux improved following surgery and has not had symptoms since.   Followed with ENT for sinusitis/rhinitis. LPR changes noted on laryngoscopy believed to be d/t allergic rhinitis. Astelin initiated.   At follow up with ENT she c/o globus sensation and dysphagia therefore GI referral placed.   She has severe periodontal disease for which she is currently being followed. She is unable to chew food and is primarily eating soft foods.  Feels food sticking at sternal notch if it is not mashed/chewed well enough. Does not occur with liquids or medications.   Denies heartburn, regurgitation, water brash but does report frequent throat clearing.   Not currently taking PPI/H2 blocker    Treatments: Astelin, Claritin, Nasacort  NSAIDs: ASA 81 mg    GLP-1s: No  Anticoagulation or Antiplatelet: No    History of H.pylori: no  H.pylori Treatment:  Prior Upper Endoscopy: about 10 years ago  Prior Colonoscopy:  Cologuard 2021 Negative  Family h/o Colon Cancer: No  Family h/o Crohn's Disease or Ulcerative Colitis: No  Family h/o Celiac Sprue: No  Abdominal Surgeries: Hiatal Hernia Repair, Cholecystectomy    Review of Systems   Constitutional:  Negative for weight loss.   HENT:  Negative for sore throat.         Rhinitis, throat clearing   Eyes:  Negative for blurred vision.   Respiratory:  Negative for cough.    Cardiovascular:  Negative for chest pain.   Gastrointestinal:  Negative  for abdominal pain, blood in stool, constipation, diarrhea, heartburn, melena, nausea and vomiting.   Genitourinary:  Negative for dysuria.   Musculoskeletal:  Negative for myalgias.   Skin:  Negative for rash.   Neurological:  Negative for headaches.   Endo/Heme/Allergies:  Negative for environmental allergies.   Psychiatric/Behavioral:  Negative for suicidal ideas. The patient is not nervous/anxious.        Past Medical History: has a past medical history of Allergy, Fibrocystic breast, and History of cholecystectomy.    Past Surgical History: has a past surgical history that includes Hernia repair; Vaginal delivery; Cholecystectomy; Biopsy (10/28/2020); and Breast biopsy (Right, 10/28/2020).    Family History:family history includes Cancer in her mother; Clotting disorder in her son; Diabetes in her brother, father, and sister; Endometrial cancer in her maternal grandmother and mother; No Known Problems in her sister and son; Stroke in her brother.    Allergies:   Review of patient's allergies indicates:   Allergen Reactions    Ibuprofen Other (See Comments)     Leg and hand       Social History: reports that she has never smoked. She has never used smokeless tobacco. She reports that she does not drink alcohol and does not use drugs.    Home medications:   Current Outpatient Medications on File Prior to Visit   Medication Sig Dispense Refill    acetaminophen (TYLENOL) 500 MG tablet Take 2 tablets every 8 hours by oral route as needed for 14 days.      aspirin (ECOTRIN) 81 MG EC tablet Take 81 mg by mouth once.       azelastine (ASTELIN) 137 mcg (0.1 %) nasal spray 1 spray (137 mcg total) by Nasal route 2 (two) times daily. 15 mL 2    diphenhydrAMINE (BENADRYL) 25 mg capsule Take 25 mg by mouth every 6 (six) hours as needed for Itching.      triamcinolone (NASACORT) 55 mcg nasal inhaler 2 sprays by Nasal route once daily.      loratadine (CLARITIN) 10 mg tablet Take 1 tablet (10 mg total) by mouth daily as  "needed. 30 tablet 0     Current Facility-Administered Medications on File Prior to Visit   Medication Dose Route Frequency Provider Last Rate Last Admin    polidocanoL 1 % (20 mg/2 mL) injection Soln 0.2 mL  0.2 mL Intravenous 1 time in Clinic/HOD Kyler Daly MD           Vital signs:  Ht 5' 7" (1.702 m)   Wt 80.4 kg (177 lb 4 oz)   BMI 27.76 kg/m²     Physical Exam  Vitals reviewed.   Constitutional:       Appearance: Normal appearance.   HENT:      Head: Normocephalic.   Pulmonary:      Effort: Pulmonary effort is normal. No respiratory distress.   Abdominal:      General: There is no distension.      Palpations: Abdomen is soft.      Tenderness: There is no abdominal tenderness.   Skin:     General: Skin is warm.   Neurological:      Mental Status: She is alert and oriented to person, place, and time.   Psychiatric:         Behavior: Behavior normal.         Thought Content: Thought content normal.         Routine labs:  Lab Results   Component Value Date    WBC 3.77 (L) 06/27/2023    HGB 13.7 06/27/2023    HCT 42 06/27/2023    MCV 83 06/27/2023     06/27/2023     No results found for: "INR"  No results found for: "IRON", "FERRITIN", "TIBC", "FESATURATED"  Lab Results   Component Value Date     06/27/2023    K 4.6 06/27/2023     06/27/2023    CO2 28 06/27/2023    BUN 9 06/27/2023    CREATININE 0.7 06/27/2023     Lab Results   Component Value Date    ALBUMIN 4.0 06/27/2023    ALT 16 06/27/2023    AST 18 06/27/2023    ALKPHOS 73 06/27/2023    BILITOT 0.4 06/27/2023     No results found for: "GLUCOSE"  Lab Results   Component Value Date    TSH 1.460 06/06/2023     Lab Results   Component Value Date    CALCIUM 10.0 06/27/2023       Imaging:      I have reviewed prior labs, imaging, and notes.      Assessment:  1. Dysphagia, unspecified type    2. Laryngopharyngeal reflux (LPR)    3. History of hiatal hernia      Food sticking at sternal notch. Currently being treated for periodontal " disease; has difficult time chewing food.   Having to mash food in order to eat. Dysphagia occurring primarily if food is not chewed/mashed.   Suspect periodontal disease is contributing to problems swallowing.   No typical symptoms of reflux. Has h/o hiatal hernia s/p repair about 10 years ago.     Plan:  Orders Placed This Encounter    FL Esophagram With Barium Tablet     Esophagram with Barium Tablet  Continue to follow with periodontist   Follow with ENT for rhinitis/sinusitis     If symptoms continue or worsen, consider EGD    Plan of care discussed with patient who is in agreement and verbalized understanding.     I have explained the planned procedures to the patient.The risks, benefits and alternatives of the procedure were also explained in detail. Patient verbalized understanding, all questions were answered. The patient agrees to proceed as planned    Follow Up: SOL Salcido,FNP-BC  [facility] [place]    (Portions of this note were dictated using voice recognition software and may contain dictation related errors in spelling/grammar/syntax not found on text review)

## 2023-12-07 NOTE — H&P (VIEW-ONLY)
GASTROENTEROLOGY CLINIC NOTE    Chief Complaint: The primary encounter diagnosis was Dysphagia, unspecified type. Diagnoses of Laryngopharyngeal reflux (LPR) and History of hiatal hernia were also pertinent to this visit.  Referring provider/PCP: Vivi Stiles MD    Cloie Bodden Reyes is a 63 y.o. female who is a new patient to me with a PMH that's significant for hiatal hernia, PND, and periodontal disease  She was referred to us by ENT is here today to establish care for LPR and dysphagia.   Has h/o hiatal hernia repair about 10-15 years ago. Reflux improved following surgery and has not had symptoms since.   Followed with ENT for sinusitis/rhinitis. LPR changes noted on laryngoscopy believed to be d/t allergic rhinitis. Astelin initiated.   At follow up with ENT she c/o globus sensation and dysphagia therefore GI referral placed.   She has severe periodontal disease for which she is currently being followed. She is unable to chew food and is primarily eating soft foods.  Feels food sticking at sternal notch if it is not mashed/chewed well enough. Does not occur with liquids or medications.   Denies heartburn, regurgitation, water brash but does report frequent throat clearing.   Not currently taking PPI/H2 blocker    Treatments: Astelin, Claritin, Nasacort  NSAIDs: ASA 81 mg    GLP-1s: No  Anticoagulation or Antiplatelet: No    History of H.pylori: no  H.pylori Treatment:  Prior Upper Endoscopy: about 10 years ago  Prior Colonoscopy:  Cologuard 2021 Negative  Family h/o Colon Cancer: No  Family h/o Crohn's Disease or Ulcerative Colitis: No  Family h/o Celiac Sprue: No  Abdominal Surgeries: Hiatal Hernia Repair, Cholecystectomy    Review of Systems   Constitutional:  Negative for weight loss.   HENT:  Negative for sore throat.         Rhinitis, throat clearing   Eyes:  Negative for blurred vision.   Respiratory:  Negative for cough.    Cardiovascular:  Negative for chest pain.   Gastrointestinal:  Negative  for abdominal pain, blood in stool, constipation, diarrhea, heartburn, melena, nausea and vomiting.   Genitourinary:  Negative for dysuria.   Musculoskeletal:  Negative for myalgias.   Skin:  Negative for rash.   Neurological:  Negative for headaches.   Endo/Heme/Allergies:  Negative for environmental allergies.   Psychiatric/Behavioral:  Negative for suicidal ideas. The patient is not nervous/anxious.        Past Medical History: has a past medical history of Allergy, Fibrocystic breast, and History of cholecystectomy.    Past Surgical History: has a past surgical history that includes Hernia repair; Vaginal delivery; Cholecystectomy; Biopsy (10/28/2020); and Breast biopsy (Right, 10/28/2020).    Family History:family history includes Cancer in her mother; Clotting disorder in her son; Diabetes in her brother, father, and sister; Endometrial cancer in her maternal grandmother and mother; No Known Problems in her sister and son; Stroke in her brother.    Allergies:   Review of patient's allergies indicates:   Allergen Reactions    Ibuprofen Other (See Comments)     Leg and hand       Social History: reports that she has never smoked. She has never used smokeless tobacco. She reports that she does not drink alcohol and does not use drugs.    Home medications:   Current Outpatient Medications on File Prior to Visit   Medication Sig Dispense Refill    acetaminophen (TYLENOL) 500 MG tablet Take 2 tablets every 8 hours by oral route as needed for 14 days.      aspirin (ECOTRIN) 81 MG EC tablet Take 81 mg by mouth once.       azelastine (ASTELIN) 137 mcg (0.1 %) nasal spray 1 spray (137 mcg total) by Nasal route 2 (two) times daily. 15 mL 2    diphenhydrAMINE (BENADRYL) 25 mg capsule Take 25 mg by mouth every 6 (six) hours as needed for Itching.      triamcinolone (NASACORT) 55 mcg nasal inhaler 2 sprays by Nasal route once daily.      loratadine (CLARITIN) 10 mg tablet Take 1 tablet (10 mg total) by mouth daily as  "needed. 30 tablet 0     Current Facility-Administered Medications on File Prior to Visit   Medication Dose Route Frequency Provider Last Rate Last Admin    polidocanoL 1 % (20 mg/2 mL) injection Soln 0.2 mL  0.2 mL Intravenous 1 time in Clinic/HOD Kyler Daly MD           Vital signs:  Ht 5' 7" (1.702 m)   Wt 80.4 kg (177 lb 4 oz)   BMI 27.76 kg/m²     Physical Exam  Vitals reviewed.   Constitutional:       Appearance: Normal appearance.   HENT:      Head: Normocephalic.   Pulmonary:      Effort: Pulmonary effort is normal. No respiratory distress.   Abdominal:      General: There is no distension.      Palpations: Abdomen is soft.      Tenderness: There is no abdominal tenderness.   Skin:     General: Skin is warm.   Neurological:      Mental Status: She is alert and oriented to person, place, and time.   Psychiatric:         Behavior: Behavior normal.         Thought Content: Thought content normal.         Routine labs:  Lab Results   Component Value Date    WBC 3.77 (L) 06/27/2023    HGB 13.7 06/27/2023    HCT 42 06/27/2023    MCV 83 06/27/2023     06/27/2023     No results found for: "INR"  No results found for: "IRON", "FERRITIN", "TIBC", "FESATURATED"  Lab Results   Component Value Date     06/27/2023    K 4.6 06/27/2023     06/27/2023    CO2 28 06/27/2023    BUN 9 06/27/2023    CREATININE 0.7 06/27/2023     Lab Results   Component Value Date    ALBUMIN 4.0 06/27/2023    ALT 16 06/27/2023    AST 18 06/27/2023    ALKPHOS 73 06/27/2023    BILITOT 0.4 06/27/2023     No results found for: "GLUCOSE"  Lab Results   Component Value Date    TSH 1.460 06/06/2023     Lab Results   Component Value Date    CALCIUM 10.0 06/27/2023       Imaging:      I have reviewed prior labs, imaging, and notes.      Assessment:  1. Dysphagia, unspecified type    2. Laryngopharyngeal reflux (LPR)    3. History of hiatal hernia      Food sticking at sternal notch. Currently being treated for periodontal " disease; has difficult time chewing food.   Having to mash food in order to eat. Dysphagia occurring primarily if food is not chewed/mashed.   Suspect periodontal disease is contributing to problems swallowing.   No typical symptoms of reflux. Has h/o hiatal hernia s/p repair about 10 years ago.     Plan:  Orders Placed This Encounter    FL Esophagram With Barium Tablet     Esophagram with Barium Tablet  Continue to follow with periodontist   Follow with ENT for rhinitis/sinusitis     If symptoms continue or worsen, consider EGD    Plan of care discussed with patient who is in agreement and verbalized understanding.     I have explained the planned procedures to the patient.The risks, benefits and alternatives of the procedure were also explained in detail. Patient verbalized understanding, all questions were answered. The patient agrees to proceed as planned    Follow Up: SOL Salcido,FNP-BC  [facility] [place]    (Portions of this note were dictated using voice recognition software and may contain dictation related errors in spelling/grammar/syntax not found on text review)

## 2023-12-14 ENCOUNTER — HOSPITAL ENCOUNTER (OUTPATIENT)
Dept: RADIOLOGY | Facility: HOSPITAL | Age: 63
Discharge: HOME | End: 2023-12-14
Attending: PHYSICIAN ASSISTANT
Payer: COMMERCIAL

## 2023-12-14 DIAGNOSIS — Z13.6 ENCOUNTER FOR SCREENING FOR CARDIOVASCULAR DISORDERS: ICD-10-CM

## 2023-12-14 PROCEDURE — 75571 CT HRT W/O DYE W/CA TEST: CPT | Mod: TC

## 2023-12-14 PROCEDURE — 75571 CT CALCIUM SCORING CARDIAC: ICD-10-PCS | Mod: 26,,, | Performed by: STUDENT IN AN ORGANIZED HEALTH CARE EDUCATION/TRAINING PROGRAM

## 2023-12-14 PROCEDURE — 75571 CT HRT W/O DYE W/CA TEST: CPT | Mod: 26,,, | Performed by: STUDENT IN AN ORGANIZED HEALTH CARE EDUCATION/TRAINING PROGRAM

## 2023-12-15 ENCOUNTER — HOSPITAL ENCOUNTER (OUTPATIENT)
Dept: RADIOLOGY | Facility: HOSPITAL | Age: 63
Discharge: HOME | End: 2023-12-15
Attending: NURSE PRACTITIONER
Payer: COMMERCIAL

## 2023-12-15 ENCOUNTER — PATIENT MESSAGE (OUTPATIENT)
Dept: GASTROENTEROLOGY | Facility: CLINIC | Age: 63
End: 2023-12-15
Payer: COMMERCIAL

## 2023-12-15 ENCOUNTER — TELEPHONE (OUTPATIENT)
Dept: GASTROENTEROLOGY | Facility: CLINIC | Age: 63
End: 2023-12-15
Payer: COMMERCIAL

## 2023-12-15 DIAGNOSIS — R93.3 ABNORMAL ESOPHAGRAM: ICD-10-CM

## 2023-12-15 DIAGNOSIS — R13.10 DYSPHAGIA, UNSPECIFIED TYPE: Primary | ICD-10-CM

## 2023-12-15 DIAGNOSIS — R13.10 DYSPHAGIA, UNSPECIFIED TYPE: ICD-10-CM

## 2023-12-15 PROCEDURE — 74220 X-RAY XM ESOPHAGUS 1CNTRST: CPT | Mod: 26,,, | Performed by: RADIOLOGY

## 2023-12-15 PROCEDURE — 74220 X-RAY XM ESOPHAGUS 1CNTRST: CPT | Mod: TC

## 2023-12-15 PROCEDURE — 74220 FL ESOPHAGRAM WITH BARIUM TABLET: ICD-10-PCS | Mod: 26,,, | Performed by: RADIOLOGY

## 2023-12-15 NOTE — TELEPHONE ENCOUNTER
Esophagram indicates cricopharyngeal bar. Possible prominent esophageal fold or small web.   Discussed findings with patient. Recommend EGD with possible dilation.   Patient verbalized understanding. All questions answered.

## 2023-12-15 NOTE — LETTER
[place] - Gastroenterology  [address]  [address]  [place] [address]  Phone: [phone]         EGD Instructions    [facility]  [address] [facility]  Clinic Office [phone]  Endoscopy Lab [phone]    You are scheduled for an EGD with Dr. Landeros   on  12/28/ 23  at [facility] in [place].    Check in at the Hospital -1st floor, Information desk.   Call [phone] to reschedule.    You cannot have anything to eat or drink after Midnight. You can brush your teeth with a sip of water.     An adult friend/family member must come with you to drive you home.  You cannot drive, take a taxi, Uber/Lyft or bus to leave the Endoscopy Center alone.  If you do not have someone to drive you home, your test will be cancelled.     Please follow the directions of your doctor if you take any pills that thin your blood. If you take these meds: Aggrenox, Brilinta, Effient, Eliquis, Lovenox, Plavix, Pletal, Pradaxa, Ticilid, Xarelto or Coumadin, let the doctor's office know.    Please hold any GLP-1 medications prior to the procedure: Dulaglutide Trulicity(hold week prior), Exenatide Byetta (hold the morning of procedure), Semaglutide Ozempic (hold week prior), Liraglutide Victoza, Saxenda(hold week prior), Lixisenatide Adlyxin (hold the morning of procedure), Semaglutide Rybelsus (hold the morning of procedure), Tirzepatide Mounjaro (hold week prior)     DON'T: On the morning of the test do not take insulin or pills for diabetes.     DO: On the morning of the test, do take any pills for blood pressure, heart, anti-rejection and or seizures with a small sip of water. Bring any inhalers with you.    Leave all valuables and jewelry at home. You will be at the hospital for 2-4 hours.    Call the Endoscopy department at [phone] with any questions about your procedure.    Thank you for choosing [facility].

## 2023-12-15 NOTE — TELEPHONE ENCOUNTER
Spoke with pt and scheduled egd with dr. Landeros 12/28/23 went over instruction. Instructions mailed and sent to portal. Verbal understanding

## 2023-12-18 ENCOUNTER — TELEPHONE (OUTPATIENT)
Dept: ENDOSCOPY | Facility: HOSPITAL | Age: 63
End: 2023-12-18
Payer: COMMERCIAL

## 2023-12-19 ENCOUNTER — TELEPHONE (OUTPATIENT)
Dept: FAMILY MEDICINE | Facility: CLINIC | Age: 63
End: 2023-12-19
Payer: COMMERCIAL

## 2023-12-19 DIAGNOSIS — Z12.11 COLON CANCER SCREENING: Primary | ICD-10-CM

## 2023-12-19 NOTE — TELEPHONE ENCOUNTER
----- Message from Vivien De Anda RN sent at 12/18/2023  1:41 PM CST -----  Regarding: colonscopy order  Patient has a referral for endoscopy in [place] and would like to have this done in [place].    Procedure: Ambulatory referral/consult to Endo Procedure  Status: Needs Scheduling (Sent to Patient)  Requested appt date: 6/20/2023 Authorizing: Vivi Stiles MD in [facility] FAMILY MED/ INTERNAL MED/ PEDS  Referral: [id number] (Authorized)      Expires: 12/19/2023 Priority: Routine  Diagnosis: Encounter for colorectal cancer screening [Z12.11, Z12.12]  Constipation, unspecified constipation type [K59.00]  Order Class: Internal Referral       Please enter a case request for [facility] Endoscopy.   Patient is hoping to have this done at the same time as her scheduled EGD on 12/28/23.

## 2023-12-26 ENCOUNTER — CLINICAL SUPPORT (OUTPATIENT)
Dept: ENDOSCOPY | Facility: HOSPITAL | Age: 63
End: 2023-12-26
Attending: FAMILY MEDICINE
Payer: COMMERCIAL

## 2023-12-26 ENCOUNTER — TELEPHONE (OUTPATIENT)
Dept: ENDOSCOPY | Facility: HOSPITAL | Age: 63
End: 2023-12-26
Payer: COMMERCIAL

## 2023-12-26 ENCOUNTER — TELEPHONE (OUTPATIENT)
Dept: ENDOSCOPY | Facility: HOSPITAL | Age: 63
End: 2023-12-26

## 2023-12-26 DIAGNOSIS — Z12.11 COLON CANCER SCREENING: ICD-10-CM

## 2023-12-26 RX ORDER — SODIUM, POTASSIUM,MAG SULFATES 17.5-3.13G
1 SOLUTION, RECONSTITUTED, ORAL ORAL DAILY
Qty: 1 KIT | Refills: 0 | Status: SHIPPED | OUTPATIENT
Start: 2023-12-26 | End: 2023-12-28

## 2023-12-26 NOTE — TELEPHONE ENCOUNTER
Spoke to pt to schedule procedure(s) Colonoscopy       Physician to perform procedure(s) Dr. MIR Kaufman  Date of Procedure (s) 4/22/24  Arrival Time 10:30 AM  Time of Procedure(s) 11:30 AM   Location of Procedure(s) [address]  Type of Rx Prep sent to patient: Suprep  Instructions provided to patient via [facility]    Patient was informed on the following information and verbalized understanding. Screening questionnaire reviewed with patient and complete. If procedure requires anesthesia, a responsible adult needs to be present to accompany the patient home, patient cannot drive after receiving anesthesia. Appointment details are tentative, especially check-in time. Patient will receive a prep-op call 7 days prior to confirm check-in time for procedure. If applicable the patient should contact their pharmacy to verify Rx for procedure prep is ready for pick-up. Patient was advised to call the scheduling department at [phone] if pharmacy states no Rx is available. Patient was advised to call the endoscopy scheduling department if any questions or concerns arise.      [facility] Endoscopy Scheduling Department

## 2023-12-28 ENCOUNTER — ANESTHESIA (OUTPATIENT)
Dept: ENDOSCOPY | Facility: HOSPITAL | Age: 63
End: 2023-12-28
Payer: COMMERCIAL

## 2023-12-28 ENCOUNTER — HOSPITAL ENCOUNTER (OUTPATIENT)
Facility: HOSPITAL | Age: 63
Discharge: HOME | End: 2023-12-28
Attending: INTERNAL MEDICINE | Admitting: INTERNAL MEDICINE
Payer: COMMERCIAL

## 2023-12-28 ENCOUNTER — ANESTHESIA EVENT (OUTPATIENT)
Dept: ENDOSCOPY | Facility: HOSPITAL | Age: 63
End: 2023-12-28
Payer: COMMERCIAL

## 2023-12-28 VITALS
TEMPERATURE: 98 F | BODY MASS INDEX: 27.15 KG/M2 | HEIGHT: 67 IN | DIASTOLIC BLOOD PRESSURE: 20 MMHG | HEART RATE: 64 BPM | RESPIRATION RATE: 20 BRPM | OXYGEN SATURATION: 100 % | WEIGHT: 173 LBS | SYSTOLIC BLOOD PRESSURE: 119 MMHG

## 2023-12-28 DIAGNOSIS — R13.10 DYSPHAGIA: ICD-10-CM

## 2023-12-28 PROCEDURE — 25000003 PHARM REV CODE 250: Performed by: INTERNAL MEDICINE

## 2023-12-28 PROCEDURE — 43248 EGD GUIDE WIRE INSERTION: CPT | Mod: ,,, | Performed by: INTERNAL MEDICINE

## 2023-12-28 PROCEDURE — 88341 IMHCHEM/IMCYTCHM EA ADD ANTB: CPT | Performed by: STUDENT IN AN ORGANIZED HEALTH CARE EDUCATION/TRAINING PROGRAM

## 2023-12-28 PROCEDURE — D9220A PRA ANESTHESIA: Mod: CRNA,,, | Performed by: NURSE ANESTHETIST, CERTIFIED REGISTERED

## 2023-12-28 PROCEDURE — D9220A PRA ANESTHESIA: ICD-10-PCS | Mod: ANES,,, | Performed by: STUDENT IN AN ORGANIZED HEALTH CARE EDUCATION/TRAINING PROGRAM

## 2023-12-28 PROCEDURE — 88342 IMHCHEM/IMCYTCHM 1ST ANTB: CPT | Mod: 26,,, | Performed by: STUDENT IN AN ORGANIZED HEALTH CARE EDUCATION/TRAINING PROGRAM

## 2023-12-28 PROCEDURE — 88305 TISSUE EXAM BY PATHOLOGIST: CPT | Performed by: STUDENT IN AN ORGANIZED HEALTH CARE EDUCATION/TRAINING PROGRAM

## 2023-12-28 PROCEDURE — 25000003 PHARM REV CODE 250: Performed by: NURSE ANESTHETIST, CERTIFIED REGISTERED

## 2023-12-28 PROCEDURE — 43239 EGD BIOPSY SINGLE/MULTIPLE: CPT | Mod: 59,,, | Performed by: INTERNAL MEDICINE

## 2023-12-28 PROCEDURE — 27201012 HC FORCEPS, HOT/COLD, DISP: Performed by: INTERNAL MEDICINE

## 2023-12-28 PROCEDURE — 37000009 HC ANESTHESIA EA ADD 15 MINS: Performed by: INTERNAL MEDICINE

## 2023-12-28 PROCEDURE — 37000008 HC ANESTHESIA 1ST 15 MINUTES: Performed by: INTERNAL MEDICINE

## 2023-12-28 PROCEDURE — D9220A PRA ANESTHESIA: ICD-10-PCS | Mod: CRNA,,, | Performed by: NURSE ANESTHETIST, CERTIFIED REGISTERED

## 2023-12-28 PROCEDURE — 88341 IMHCHEM/IMCYTCHM EA ADD ANTB: CPT | Mod: 26,,, | Performed by: STUDENT IN AN ORGANIZED HEALTH CARE EDUCATION/TRAINING PROGRAM

## 2023-12-28 PROCEDURE — 88342 IMHCHEM/IMCYTCHM 1ST ANTB: CPT | Mod: 59 | Performed by: STUDENT IN AN ORGANIZED HEALTH CARE EDUCATION/TRAINING PROGRAM

## 2023-12-28 PROCEDURE — 88305 TISSUE EXAM BY PATHOLOGIST: CPT | Mod: 26,,, | Performed by: STUDENT IN AN ORGANIZED HEALTH CARE EDUCATION/TRAINING PROGRAM

## 2023-12-28 PROCEDURE — 43239 EGD BIOPSY SINGLE/MULTIPLE: CPT | Mod: 59 | Performed by: INTERNAL MEDICINE

## 2023-12-28 PROCEDURE — 43248 EGD GUIDE WIRE INSERTION: CPT | Performed by: INTERNAL MEDICINE

## 2023-12-28 PROCEDURE — D9220A PRA ANESTHESIA: Mod: ANES,,, | Performed by: STUDENT IN AN ORGANIZED HEALTH CARE EDUCATION/TRAINING PROGRAM

## 2023-12-28 PROCEDURE — C1769 GUIDE WIRE: HCPCS | Performed by: INTERNAL MEDICINE

## 2023-12-28 PROCEDURE — 63600175 PHARM REV CODE 636 W HCPCS: Performed by: NURSE ANESTHETIST, CERTIFIED REGISTERED

## 2023-12-28 RX ORDER — LIDOCAINE HYDROCHLORIDE 20 MG/ML
INJECTION, SOLUTION EPIDURAL; INFILTRATION; INTRACAUDAL; PERINEURAL
Status: DISCONTINUED | OUTPATIENT
Start: 2023-12-28 | End: 2023-12-28

## 2023-12-28 RX ORDER — SODIUM CHLORIDE 9 MG/ML
INJECTION, SOLUTION INTRAVENOUS CONTINUOUS
Status: DISCONTINUED | OUTPATIENT
Start: 2023-12-28 | End: 2023-12-28 | Stop reason: HOSPADM

## 2023-12-28 RX ORDER — PROPOFOL 10 MG/ML
VIAL (ML) INTRAVENOUS CONTINUOUS PRN
Status: DISCONTINUED | OUTPATIENT
Start: 2023-12-28 | End: 2023-12-28

## 2023-12-28 RX ORDER — PROPOFOL 10 MG/ML
VIAL (ML) INTRAVENOUS
Status: DISCONTINUED | OUTPATIENT
Start: 2023-12-28 | End: 2023-12-28

## 2023-12-28 RX ADMIN — SODIUM CHLORIDE: 0.9 INJECTION, SOLUTION INTRAVENOUS at 11:12

## 2023-12-28 RX ADMIN — PROPOFOL 100 MG: 10 INJECTION, EMULSION INTRAVENOUS at 11:12

## 2023-12-28 RX ADMIN — LIDOCAINE HYDROCHLORIDE 60 MG: 20 INJECTION, SOLUTION EPIDURAL; INFILTRATION; INTRACAUDAL; PERINEURAL at 11:12

## 2023-12-28 RX ADMIN — SODIUM CHLORIDE: 9 INJECTION, SOLUTION INTRAVENOUS at 10:12

## 2023-12-28 RX ADMIN — PROPOFOL 150 MCG/KG/MIN: 10 INJECTION, EMULSION INTRAVENOUS at 11:12

## 2023-12-28 NOTE — TRANSFER OF CARE
"Anesthesia Transfer of Care Note    Patient: Cloie Bodden Reyes    Procedure(s) Performed: Procedure(s) (LRB):  EGD (ESOPHAGOGASTRODUODENOSCOPY) (N/A)    Patient location: GI    Anesthesia Type: general    Transport from OR: Transported from OR on room air with adequate spontaneous ventilation    Post pain: adequate analgesia    Post assessment: no apparent anesthetic complications    Post vital signs: stable    Level of consciousness: responds to stimulation    Nausea/Vomiting: no nausea/vomiting    Complications: none    Transfer of care protocol was followed      Last vitals: Visit Vitals  /69 (BP Location: Left arm)   Pulse 67   Temp 36.8 °C (98.2 °F) (Temporal)   Resp 18   Ht 5' 7" (1.702 m)   Wt 78.5 kg (173 lb)   LMP 05/11/2017 (Approximate)   SpO2 99%   Breastfeeding No   BMI 27.10 kg/m²     "

## 2023-12-28 NOTE — PROVATION PATIENT INSTRUCTIONS
Discharge Summary/Instructions after an Endoscopic Procedure  Patient Name: Cloie Reyes  Patient MRN: [id number]  Patient YOB: 1960  Thursday, December 28, 2023  Tom Landeros MD  Dear patient,  As a result of recent federal legislation (The Federal Cures Act), you may   receive lab or pathology results from your procedure in your [facility]   account before your physician is able to contact you. Your physician or   their representative will relay the results to you with their   recommendations at their soonest availability.  Thank you,  Your health is very important to us during the Covid Crisis. Following your   procedure today, you will receive a daily text for 2 weeks asking about   signs or symptoms of Covid 19.  Please respond to this text when you   receive it so we can follow up and keep you as safe as possible.   RESTRICTIONS:  During your procedure today, you received medications for sedation.  These   medications may affect your judgment, balance and coordination.  Therefore,   for 24 hours, you have the following restrictions:   - DO NOT drive a car, operate machinery, make legal/financial decisions,   sign important papers or drink alcohol.    ACTIVITY:  Today: no heavy lifting, straining or running due to procedural   sedation/anesthesia.  The following day: return to full activity including work.  DIET:  Eat and drink normally unless instructed otherwise.     TREATMENT FOR COMMON SIDE EFFECTS:  - Mild abdominal pain, nausea, belching, bloating or excessive gas:  rest,   eat lightly and use a heating pad.  - Sore Throat: treat with throat lozenges and/or gargle with warm salt   water.  - Because air was used during the procedure, expelling large amounts of air   from your rectum or belching is normal.  - If a bowel prep was taken, you may not have a bowel movement for 1-3 days.    This is normal.  SYMPTOMS TO WATCH FOR AND REPORT TO YOUR PHYSICIAN:  1. Abdominal pain or bloating, other  than gas cramps.  2. Chest pain.  3. Back pain.  4. Signs of infection such as: chills or fever occurring within 24 hours   after the procedure.  5. Rectal bleeding, which would show as bright red, maroon, or black stools.   (A tablespoon of blood from the rectum is not serious, especially if   hemorrhoids are present.)  6. Vomiting.  7. Weakness or dizziness.  GO DIRECTLY TO THE NEAREST EMERGENCY ROOM IF YOU HAVE ANY OF THE FOLLOWING:      Difficulty breathing              Chills and/or fever over 101 F   Persistent vomiting and/or vomiting blood   Severe abdominal pain   Severe chest pain   Black, tarry stools   Bleeding- more than one tablespoon   Any other symptom or condition that you feel may need urgent attention  Your doctor recommends these additional instructions:  If any biopsies were taken, your doctors clinic will contact you in 1 to 2   weeks with any results.  - Discharge patient to home.   - Patient has a contact number available for emergencies.  The signs and   symptoms of potential delayed complications were discussed with the   patient.  Return to normal activities tomorrow.  Written discharge   instructions were provided to the patient.   - Resume previous diet.   - Continue present medications.   - Await pathology results.   - If no benefit from dilation, would need to consider seeing ENT again , for   CP bar seen on esophagram, I attempted dilation up to 51fr, with no   resistance and no mucosal rent, unlikely that repeat dilation will help,   especially if it doesnt help initially.  For questions, problems or results please call your physician - Tom Landeros MD.  EMERGENCY PHONE NUMBER: [phone],  LAB RESULTS: [phone]  IF A COMPLICATION OR EMERGENCY SITUATION ARISES AND YOU ARE UNABLE TO REACH   YOUR PHYSICIAN - GO DIRECTLY TO THE EMERGENCY ROOM.  Tom Landeros MD  12/28/2023 12:15:59 PM  This report has been verified and signed electronically.  Dear patient,  As a  result of recent federal legislation (The Federal Cures Act), you may   receive lab or pathology results from your procedure in your [facility]   account before your physician is able to contact you. Your physician or   their representative will relay the results to you with their   recommendations at their soonest availability.  Thank you,  PROVATION

## 2023-12-28 NOTE — ANESTHESIA PREPROCEDURE EVALUATION
12/28/2023  Cloie Bodden Reyes is a 63 y.o., female.      Pre-op Assessment    I have reviewed the Patient Summary Reports.    I have reviewed the NPO Status.   I have reviewed the Medications.     Review of Systems  Anesthesia Hx:               Denies Personal Hx of Anesthesia complications.                    Social:  Non-Smoker       Cardiovascular:      Valvular problems/Murmurs             ECG has been reviewed. Mild AI                         Pulmonary:  Pulmonary Normal                       Renal/:  Renal/ Normal                 Hepatic/GI:  Hepatic/GI Normal                 Neurological:  Neurology Normal                                      Endocrine:  Endocrine Normal                Physical Exam  General: Well nourished and Cooperative    Airway:  Mallampati: II   Mouth Opening: Normal  TM Distance: Normal  Tongue: Normal  Neck ROM: Normal ROM    Dental:  Dentures        Anesthesia Plan  Type of Anesthesia, risks & benefits discussed:    Anesthesia Type: Gen Natural Airway  Intra-op Monitoring Plan: Standard ASA Monitors  Post Op Pain Control Plan: multimodal analgesia  Induction:  IV  Informed Consent: Informed consent signed with the Patient and all parties understand the risks and agree with anesthesia plan.  All questions answered.   ASA Score: 2  Day of Surgery Review of History & Physical: H&P Update referred to the surgeon/provider.    Ready For Surgery From Anesthesia Perspective.     .

## 2023-12-28 NOTE — ANESTHESIA POSTPROCEDURE EVALUATION
Anesthesia Post Evaluation    Patient: Cloie Bodden Reyes    Procedure(s) Performed: Procedure(s) (LRB):  EGD (ESOPHAGOGASTRODUODENOSCOPY) (N/A)    Final Anesthesia Type: general      Patient location during evaluation: PACU  Patient participation: Yes- Able to Participate  Level of consciousness: awake and alert  Post-procedure vital signs: reviewed and stable  Pain management: adequate  Airway patency: patent    PONV status at discharge: No PONV  Anesthetic complications: no      Cardiovascular status: stable  Respiratory status: room air  Hydration status: euvolemic  Follow-up not needed.              Vitals Value Taken Time   /68 12/28/23 1230   Temp 36.8 °C (98.2 °F) 12/28/23 1215   Pulse 74 12/28/23 1230   Resp 16 12/28/23 1230   SpO2 99 % 12/28/23 1230         No case tracking events are documented in the log.      Pain/Bayron Score: No data recorded

## 2024-01-03 LAB
FINAL PATHOLOGIC DIAGNOSIS: NORMAL
GROSS: NORMAL
Lab: NORMAL
MICROSCOPIC EXAM: NORMAL

## 2024-01-24 ENCOUNTER — TELEPHONE (OUTPATIENT)
Dept: HEMATOLOGY/ONCOLOGY | Facility: CLINIC | Age: 64
End: 2024-01-24
Payer: COMMERCIAL

## 2024-01-24 NOTE — TELEPHONE ENCOUNTER
Spoke with patient she said she forgot about the appt she had yesterday and needed to reschedule. I told her that it was actually cancelled for some reason. I have her rescheduled on 2/14 and told her I will add her to the waitlist. Patient verbalized understanding and has it written down.      ----- Message from Parris Higginbotham sent at 1/24/2024  8:36 AM CST -----  Contact: pt  Type:  Sooner Appointment Request    Caller is requesting a sooner appointment.  Caller declined first available appointment listed below.  Caller will not accept being placed on the waitlist and is requesting a message be sent to doctor.  Name of Caller:pt  When is the first available appointment?  Symptoms: History of DVT (deep vein thrombosis) [Z86.718]  Would the patient rather a call back or a response via [facility]? call  Best Call Back Number:[phone]   Additional Information:

## 2024-02-14 ENCOUNTER — HOSPITAL ENCOUNTER (OUTPATIENT)
Dept: RADIOLOGY | Facility: HOSPITAL | Age: 64
Discharge: HOME | End: 2024-02-14
Attending: INTERNAL MEDICINE
Payer: COMMERCIAL

## 2024-02-14 ENCOUNTER — OFFICE VISIT (OUTPATIENT)
Dept: HEMATOLOGY/ONCOLOGY | Facility: CLINIC | Age: 64
End: 2024-02-14
Payer: COMMERCIAL

## 2024-02-14 ENCOUNTER — LAB VISIT (OUTPATIENT)
Dept: LAB | Facility: HOSPITAL | Age: 64
End: 2024-02-14
Attending: INTERNAL MEDICINE
Payer: COMMERCIAL

## 2024-02-14 ENCOUNTER — TELEPHONE (OUTPATIENT)
Dept: ENDOSCOPY | Facility: HOSPITAL | Age: 64
End: 2024-02-14
Payer: COMMERCIAL

## 2024-02-14 VITALS
HEIGHT: 67 IN | WEIGHT: 176.13 LBS | OXYGEN SATURATION: 100 % | DIASTOLIC BLOOD PRESSURE: 73 MMHG | BODY MASS INDEX: 27.64 KG/M2 | SYSTOLIC BLOOD PRESSURE: 128 MMHG | RESPIRATION RATE: 16 BRPM | HEART RATE: 66 BPM

## 2024-02-14 DIAGNOSIS — M79.89 RIGHT LEG SWELLING: ICD-10-CM

## 2024-02-14 DIAGNOSIS — Z86.718 HISTORY OF DVT (DEEP VEIN THROMBOSIS): Primary | ICD-10-CM

## 2024-02-14 DIAGNOSIS — Z86.718 HISTORY OF DVT IN ADULTHOOD: ICD-10-CM

## 2024-02-14 DIAGNOSIS — Z86.718 HISTORY OF DVT (DEEP VEIN THROMBOSIS): ICD-10-CM

## 2024-02-14 LAB — D DIMER PPP IA.FEU-MCNC: 0.48 MG/L FEU

## 2024-02-14 PROCEDURE — 99999 PR PBB SHADOW E&M-EST. PATIENT-LVL IV: CPT | Mod: PBBFAC,,, | Performed by: INTERNAL MEDICINE

## 2024-02-14 PROCEDURE — 1159F MED LIST DOCD IN RCRD: CPT | Mod: CPTII,S$GLB,, | Performed by: INTERNAL MEDICINE

## 2024-02-14 PROCEDURE — 81241 F5 GENE: CPT | Performed by: INTERNAL MEDICINE

## 2024-02-14 PROCEDURE — 85300 ANTITHROMBIN III ACTIVITY: CPT | Performed by: INTERNAL MEDICINE

## 2024-02-14 PROCEDURE — 36415 COLL VENOUS BLD VENIPUNCTURE: CPT | Performed by: INTERNAL MEDICINE

## 2024-02-14 PROCEDURE — 85306 CLOT INHIBIT PROT S FREE: CPT | Performed by: INTERNAL MEDICINE

## 2024-02-14 PROCEDURE — 3074F SYST BP LT 130 MM HG: CPT | Mod: CPTII,S$GLB,, | Performed by: INTERNAL MEDICINE

## 2024-02-14 PROCEDURE — 93971 EXTREMITY STUDY: CPT | Mod: 26,RT,, | Performed by: INTERNAL MEDICINE

## 2024-02-14 PROCEDURE — 93971 EXTREMITY STUDY: CPT | Mod: TC,RT

## 2024-02-14 PROCEDURE — 85379 FIBRIN DEGRADATION QUANT: CPT | Performed by: INTERNAL MEDICINE

## 2024-02-14 PROCEDURE — 85302 CLOT INHIBIT PROT C ANTIGEN: CPT | Performed by: INTERNAL MEDICINE

## 2024-02-14 PROCEDURE — 3078F DIAST BP <80 MM HG: CPT | Mod: CPTII,S$GLB,, | Performed by: INTERNAL MEDICINE

## 2024-02-14 PROCEDURE — 81240 F2 GENE: CPT | Performed by: INTERNAL MEDICINE

## 2024-02-14 PROCEDURE — 3008F BODY MASS INDEX DOCD: CPT | Mod: CPTII,S$GLB,, | Performed by: INTERNAL MEDICINE

## 2024-02-14 PROCEDURE — 99203 OFFICE O/P NEW LOW 30 MIN: CPT | Mod: S$GLB,,, | Performed by: INTERNAL MEDICINE

## 2024-02-14 NOTE — TELEPHONE ENCOUNTER
Spoke to patient to reschedule procedure(s) Colonoscopy       Physician to perform procedure(s) Dr. CATY Baker   Date of Procedure (s) 2/23/24  Arrival Time 8:30 AM  Time of Procedure(s) 9:30 AM   Location of Procedure(s) [facility]--  Enter at the rear of the building through the emergency department screening station or the Outpatient Registration door, then continue to endoscopy department on the 2nd floor.    Type of Rx Prep sent to patient: Suprep  Instructions provided to patient via [facility]    Patient was informed on the following information and verbalized understanding. Screening questionnaire reviewed with patient and complete. If procedure requires anesthesia, a responsible adult needs to be present to accompany the patient home, patient cannot drive after receiving anesthesia. Appointment details are tentative, especially check-in time. Patient will receive a prep-op call 7 days prior to confirm check-in time for procedure. If applicable the patient should contact their pharmacy to verify Rx for procedure prep is ready for pick-up. Patient was advised to call the scheduling department at [phone] if pharmacy states no Rx is available. Patient was advised to call the endoscopy scheduling department if any questions or concerns arise.      [facility] Endoscopy Scheduling Department

## 2024-02-14 NOTE — PROGRESS NOTES
"PATIENT: Cloie Bodden Reyes  MRN: [id number]  DATE: 2/14/2024      Subjective:     Chief complaint:  Chief Complaint   Patient presents with    Deep Vein Thrombosis    Establish Care     History of Present Illness: Ms. Reyes presents to establish care for HISTORY OF DVT.   62yo woman, notes approx 4 years ago she developed RLE DVT related to flying to [facility]. She reports that about 2-3 hours after her flight to [place] she felt pain and swelling in the RLE--she went to the doctor the following day and was diagnosed with a DVT. She notes getting "shots" for a couple weeks and symptoms improved; she has been on aspirin 81mg since that time. She does however note that for the past 2 weeks she has noticed increase in swelling and disocomfort in the RLE similar to prior DVT. Denies history of PE. Notes a son has been dealing with DVTs since his teenage years.    Review of Systems   A comprehensive review of systems was performed; pertinent positives and negatives are noted in the HPI.            Objective:      Vitals:   Vitals:    02/14/24 0842   BP: 128/73   BP Location: Left arm   Patient Position: Sitting   BP Method: Medium (Automatic)   Pulse: 66   Resp: 16   SpO2: 100%   Weight: 79.9 kg (176 lb 2.4 oz)   Height: 5' 7" (1.702 m)     BMI: Body mass index is 27.59 kg/m².      Physical Exam:   Physical Exam  Vitals and nursing note reviewed.   Constitutional:       General: She is not in acute distress.     Appearance: Normal appearance. She is not toxic-appearing.   HENT:      Head: Normocephalic and atraumatic.   Eyes:      General: No scleral icterus.     Conjunctiva/sclera: Conjunctivae normal.   Cardiovascular:      Rate and Rhythm: Normal rate.   Pulmonary:      Effort: Pulmonary effort is normal. No respiratory distress.   Abdominal:      General: There is no distension.   Musculoskeletal:         General: No swelling or deformity.      Cervical back: Neck supple.   Skin:     Coloration: Skin is not jaundiced. "      Findings: No erythema.   Neurological:      General: No focal deficit present.      Mental Status: She is alert and oriented to person, place, and time.      Motor: No weakness.   Psychiatric:         Mood and Affect: Mood normal.         Behavior: Behavior normal.           Laboratory Data:  WBC   Date Value Ref Range Status   06/27/2023 3.77 (L) 3.90 - 12.70 K/uL Final     Hemoglobin   Date Value Ref Range Status   06/27/2023 13.7 12.0 - 16.0 g/dL Final     POC Hematocrit   Date Value Ref Range Status   06/27/2023 42 36 - 54 %PCV Final     Hematocrit   Date Value Ref Range Status   06/27/2023 42.1 37.0 - 48.5 % Final     Platelets   Date Value Ref Range Status   06/27/2023 194 150 - 450 K/uL Final     Gran # (ANC)   Date Value Ref Range Status   06/27/2023 1.6 (L) 1.8 - 7.7 K/uL Final     Gran %   Date Value Ref Range Status   06/27/2023 43.3 38.0 - 73.0 % Final       Chemistry        Component Value Date/Time     06/27/2023 1130    K 4.6 06/27/2023 1130     06/27/2023 1130    CO2 28 06/27/2023 1130    BUN 9 06/27/2023 1130    CREATININE 0.7 06/27/2023 1130    GLU 86 06/27/2023 1130        Component Value Date/Time    CALCIUM 10.0 06/27/2023 1130    ALKPHOS 73 06/27/2023 1130    AST 18 06/27/2023 1130    ALT 16 06/27/2023 1130    BILITOT 0.4 06/27/2023 1130    ESTGFRAFRICA >60.0 09/30/2021 0820    EGFRNONAA >60.0 09/30/2021 0820              Assessment/Plan:     1. History of DVT (deep vein thrombosis)    2. Right leg swelling      History of DVT in a woman with strong family history of DVT; suspect there is a familial component--will evaluate for same.  It does sound as though her first VTE was clearly provoked however considering the family history, thrombophilia testing is reasonable.     She has noted some increase in swelling of the RLE though mild. Will refer for ultrasound and check d-dimer.    Med and Orders:  Orders Placed This Encounter    FAC 5 LEIDEN    PROTH MUT    PROTEIN C ANTIGEN,  TOTAL    PROTEIN S ANTIGEN, FREE & TOTAL    D dimer, quantitative    AT-III       Follow Up:  Follow up in about 6 weeks (around 3/27/2024).      Above care plan was discussed with patient and all questions were addressed to their expressed satisfaction.       Lauri Shell MD, FACP  Hematology & Medical Oncology  [facility]

## 2024-02-15 LAB — AT III ACT/NOR PPP CHRO: 122 % (ref 83–118)

## 2024-02-16 ENCOUNTER — TELEPHONE (OUTPATIENT)
Dept: HEMATOLOGY/ONCOLOGY | Facility: CLINIC | Age: 64
End: 2024-02-16
Payer: COMMERCIAL

## 2024-02-16 DIAGNOSIS — I80.9 THROMBOPHLEBITIS: Primary | ICD-10-CM

## 2024-02-16 RX ORDER — RIVAROXABAN 15 MG-20MG
KIT ORAL
Qty: 1 EACH | Refills: 0 | Status: SHIPPED | OUTPATIENT
Start: 2024-02-16 | End: 2024-03-26 | Stop reason: SDUPTHER

## 2024-02-16 NOTE — TELEPHONE ENCOUNTER
----- Message from Kerry Jacob sent at 2/16/2024 10:27 AM CST -----  Please call pt back at  [phone] she would like her results of the Ultra Sound and Lab work done on 2/14/24 .

## 2024-02-19 LAB
F2 C.20210G>A GENO BLD/T: NEGATIVE
F5 P.R506Q BLD/T QL: ABNORMAL
PROT C AG ACT/NOR PPP IA: 108 % (ref 72–160)

## 2024-02-20 ENCOUNTER — TELEPHONE (OUTPATIENT)
Dept: FAMILY MEDICINE | Facility: CLINIC | Age: 64
End: 2024-02-20
Payer: COMMERCIAL

## 2024-02-20 LAB — PROT S FREE AG ACT/NOR PPP IA: 99 % (ref 50–147)

## 2024-02-20 NOTE — TELEPHONE ENCOUNTER
----- Message from Snehal Gayle sent at 2/20/2024  9:11 AM CST -----  Type: Patient Call Back         Who called: REYES, CLOIE BODDEN [[id number]]         What is the request in detail: Pt would like to speak to office about blood clots in legs. Please advise          Can the clinic reply by [facility]? No         Would the patient rather a call back or a response via [facility]? Call back         Best call back number: Telephone Information:  Mobile          [phone]             Additional Information:         Thank you.

## 2024-02-22 ENCOUNTER — TELEPHONE (OUTPATIENT)
Dept: ENDOSCOPY | Facility: HOSPITAL | Age: 64
End: 2024-02-22
Payer: COMMERCIAL

## 2024-02-22 ENCOUNTER — ANESTHESIA EVENT (OUTPATIENT)
Dept: ENDOSCOPY | Facility: HOSPITAL | Age: 64
End: 2024-02-22
Payer: COMMERCIAL

## 2024-02-23 ENCOUNTER — ANESTHESIA (OUTPATIENT)
Dept: ENDOSCOPY | Facility: HOSPITAL | Age: 64
End: 2024-02-23
Payer: COMMERCIAL

## 2024-02-23 ENCOUNTER — HOSPITAL ENCOUNTER (OUTPATIENT)
Facility: HOSPITAL | Age: 64
Discharge: HOME | End: 2024-02-23
Attending: INTERNAL MEDICINE | Admitting: INTERNAL MEDICINE
Payer: COMMERCIAL

## 2024-02-23 VITALS
TEMPERATURE: 98 F | SYSTOLIC BLOOD PRESSURE: 107 MMHG | HEART RATE: 53 BPM | OXYGEN SATURATION: 99 % | DIASTOLIC BLOOD PRESSURE: 59 MMHG | RESPIRATION RATE: 18 BRPM

## 2024-02-23 DIAGNOSIS — Z12.11 COLON CANCER SCREENING: ICD-10-CM

## 2024-02-23 PROCEDURE — 88305 TISSUE EXAM BY PATHOLOGIST: CPT | Mod: 26,,, | Performed by: PATHOLOGY

## 2024-02-23 PROCEDURE — 25000003 PHARM REV CODE 250: Performed by: ANESTHESIOLOGY

## 2024-02-23 PROCEDURE — 45380 COLONOSCOPY AND BIOPSY: CPT | Mod: 59,PT | Performed by: INTERNAL MEDICINE

## 2024-02-23 PROCEDURE — 25000003 PHARM REV CODE 250: Performed by: STUDENT IN AN ORGANIZED HEALTH CARE EDUCATION/TRAINING PROGRAM

## 2024-02-23 PROCEDURE — 37000008 HC ANESTHESIA 1ST 15 MINUTES: Performed by: INTERNAL MEDICINE

## 2024-02-23 PROCEDURE — 45385 COLONOSCOPY W/LESION REMOVAL: CPT | Mod: PT | Performed by: INTERNAL MEDICINE

## 2024-02-23 PROCEDURE — 45385 COLONOSCOPY W/LESION REMOVAL: CPT | Mod: 33,,, | Performed by: INTERNAL MEDICINE

## 2024-02-23 PROCEDURE — 27201274 HC FORCEPS, SPYBITE: Performed by: INTERNAL MEDICINE

## 2024-02-23 PROCEDURE — 88305 TISSUE EXAM BY PATHOLOGIST: CPT | Performed by: PATHOLOGY

## 2024-02-23 PROCEDURE — 63600175 PHARM REV CODE 636 W HCPCS: Performed by: STUDENT IN AN ORGANIZED HEALTH CARE EDUCATION/TRAINING PROGRAM

## 2024-02-23 PROCEDURE — 37000009 HC ANESTHESIA EA ADD 15 MINS: Performed by: INTERNAL MEDICINE

## 2024-02-23 PROCEDURE — 27201089 HC SNARE, DISP (ANY): Performed by: INTERNAL MEDICINE

## 2024-02-23 PROCEDURE — D9220A PRA ANESTHESIA: Mod: 33,ANES,, | Performed by: ANESTHESIOLOGY

## 2024-02-23 PROCEDURE — 45380 COLONOSCOPY AND BIOPSY: CPT | Mod: 59,33,, | Performed by: INTERNAL MEDICINE

## 2024-02-23 PROCEDURE — D9220A PRA ANESTHESIA: Mod: 33,CRNA,, | Performed by: STUDENT IN AN ORGANIZED HEALTH CARE EDUCATION/TRAINING PROGRAM

## 2024-02-23 RX ORDER — SODIUM CHLORIDE 9 MG/ML
INJECTION, SOLUTION INTRAVENOUS CONTINUOUS
Status: DISCONTINUED | OUTPATIENT
Start: 2024-02-23 | End: 2024-02-23 | Stop reason: HOSPADM

## 2024-02-23 RX ORDER — PROPOFOL 10 MG/ML
VIAL (ML) INTRAVENOUS
Status: DISCONTINUED | OUTPATIENT
Start: 2024-02-23 | End: 2024-02-23

## 2024-02-23 RX ORDER — PROPOFOL 10 MG/ML
INJECTION, EMULSION INTRAVENOUS CONTINUOUS PRN
Status: DISCONTINUED | OUTPATIENT
Start: 2024-02-23 | End: 2024-02-23

## 2024-02-23 RX ORDER — PHENYLEPHRINE HCL IN 0.9% NACL 1 MG/10 ML
SYRINGE (ML) INTRAVENOUS
Status: DISCONTINUED
Start: 2024-02-23 | End: 2024-02-23 | Stop reason: HOSPADM

## 2024-02-23 RX ORDER — PHENYLEPHRINE HYDROCHLORIDE 10 MG/ML
INJECTION INTRAVENOUS
Status: DISCONTINUED | OUTPATIENT
Start: 2024-02-23 | End: 2024-02-23

## 2024-02-23 RX ORDER — LIDOCAINE HYDROCHLORIDE 20 MG/ML
INJECTION INTRAVENOUS
Status: DISCONTINUED | OUTPATIENT
Start: 2024-02-23 | End: 2024-02-23

## 2024-02-23 RX ORDER — PROPOFOL 10 MG/ML
INJECTION, EMULSION INTRAVENOUS
Status: DISCONTINUED
Start: 2024-02-23 | End: 2024-02-23 | Stop reason: HOSPADM

## 2024-02-23 RX ORDER — LIDOCAINE HYDROCHLORIDE 20 MG/ML
INJECTION, SOLUTION EPIDURAL; INFILTRATION; INTRACAUDAL; PERINEURAL
Status: DISCONTINUED
Start: 2024-02-23 | End: 2024-02-23 | Stop reason: HOSPADM

## 2024-02-23 RX ADMIN — PROPOFOL 20 MG: 10 INJECTION, EMULSION INTRAVENOUS at 10:02

## 2024-02-23 RX ADMIN — GLYCOPYRROLATE 0.2 MG: 0.2 INJECTION, SOLUTION INTRAMUSCULAR; INTRAVITREAL at 09:02

## 2024-02-23 RX ADMIN — PHENYLEPHRINE HYDROCHLORIDE 100 MCG: 10 INJECTION INTRAVENOUS at 09:02

## 2024-02-23 RX ADMIN — PROPOFOL 150 MCG/KG/MIN: 10 INJECTION, EMULSION INTRAVENOUS at 09:02

## 2024-02-23 RX ADMIN — PROPOFOL 20 MG: 10 INJECTION, EMULSION INTRAVENOUS at 09:02

## 2024-02-23 RX ADMIN — PROPOFOL 70 MG: 10 INJECTION, EMULSION INTRAVENOUS at 09:02

## 2024-02-23 RX ADMIN — SODIUM CHLORIDE: 0.9 INJECTION, SOLUTION INTRAVENOUS at 09:02

## 2024-02-23 RX ADMIN — LIDOCAINE HYDROCHLORIDE 80 MG: 20 INJECTION, SOLUTION INTRAVENOUS at 09:02

## 2024-02-23 NOTE — ANESTHESIA POSTPROCEDURE EVALUATION
Anesthesia Post Evaluation    Patient: Cloie Bodden Reyes    Procedure(s) Performed: Procedure(s) (LRB):  COLONOSCOPY (N/A)    Final Anesthesia Type: general      Patient location during evaluation: GI PACU  Patient participation: Yes- Able to Participate  Level of consciousness: awake and alert  Post-procedure vital signs: reviewed and stable  Airway patency: patent    PONV status at discharge: No PONV  Anesthetic complications: no      Cardiovascular status: blood pressure returned to baseline and hemodynamically stable  Respiratory status: unassisted, spontaneous ventilation and room air  Hydration status: euvolemic  Follow-up not needed.              Vitals Value Taken Time   /59 02/23/24 1051   Temp 36.4 °C (97.5 °F) 02/23/24 1021   Pulse 53 02/23/24 1051   Resp 18 02/23/24 1051   SpO2 99 % 02/23/24 1051         Event Time   Out of Recovery 10:51:00         Pain/Bayron Score: Bayron Score: 10 (2/23/2024 10:51 AM)

## 2024-02-23 NOTE — PROVATION PATIENT INSTRUCTIONS
Discharge Summary/Instructions after an Endoscopic Procedure  Patient Name: Cloie Reyes  Patient MRN: [id number]  Patient YOB: 1960  Friday, February 23, 2024  Michelle Baker MD  Dear patient,  As a result of recent federal legislation (The Federal Cures Act), you may   receive lab or pathology results from your procedure in your [facility]   account before your physician is able to contact you. Your physician or   their representative will relay the results to you with their   recommendations at their soonest availability.  Thank you,  RESTRICTIONS:  During your procedure today, you received medications for sedation.  These   medications may affect your judgment, balance and coordination.  Therefore,   for 24 hours, you have the following restrictions:   - DO NOT drive a car, operate machinery, make legal/financial decisions,   sign important papers or drink alcohol.    ACTIVITY:  Today: no heavy lifting, straining or running due to procedural   sedation/anesthesia.  The following day: return to full activity including work.  DIET:  Eat and drink normally unless instructed otherwise.     TREATMENT FOR COMMON SIDE EFFECTS:  - Mild abdominal pain, nausea, belching, bloating or excessive gas:  rest,   eat lightly and use a heating pad.  - Sore Throat: treat with throat lozenges and/or gargle with warm salt   water.  - Because air was used during the procedure, expelling large amounts of air   from your rectum or belching is normal.  - If a bowel prep was taken, you may not have a bowel movement for 1-3 days.    This is normal.  SYMPTOMS TO WATCH FOR AND REPORT TO YOUR PHYSICIAN:  1. Abdominal pain or bloating, other than gas cramps.  2. Chest pain.  3. Back pain.  4. Signs of infection such as: chills or fever occurring within 24 hours   after the procedure.  5. Rectal bleeding, which would show as bright red, maroon, or black stools.   (A tablespoon of blood from the rectum is not serious, especially  if   hemorrhoids are present.)  6. Vomiting.  7. Weakness or dizziness.  GO DIRECTLY TO THE NEAREST EMERGENCY ROOM IF YOU HAVE ANY OF THE FOLLOWING:      Difficulty breathing              Chills and/or fever over 101 F   Persistent vomiting and/or vomiting blood   Severe abdominal pain   Severe chest pain   Black, tarry stools   Bleeding- more than one tablespoon   Any other symptom or condition that you feel may need urgent attention  Your doctor recommends these additional instructions:  If any biopsies were taken, your doctors clinic will contact you in 1 to 2   weeks with any results.  - Discharge patient to home.   - Resume previous diet.   - Continue present medications.   - Await pathology results.   - Repeat colonoscopy in 3 years for surveillance.  For questions, problems or results please call your physician - Michelle Baker MD at Work:  [phone].  [facility] can be reached at [phone]     IF A COMPLICATION OR EMERGENCY SITUATION ARISES AND YOU ARE UNABLE TO REACH   YOUR PHYSICIAN - GO DIRECTLY TO THE EMERGENCY ROOM.  Michelle Baker MD  2/23/2024 10:22:03 AM  This report has been verified and signed electronically.  Dear patient,  As a result of recent federal legislation (The Federal Cures Act), you may   receive lab or pathology results from your procedure in your [facility]   account before your physician is able to contact you. Your physician or   their representative will relay the results to you with their   recommendations at their soonest availability.  Thank you,  PROVATION

## 2024-02-23 NOTE — ANESTHESIA PREPROCEDURE EVALUATION
02/23/2024  Cloie Bodden Reyes is a 63 y.o., female.      Pre-op Assessment    I have reviewed the Patient Summary Reports.     I have reviewed the Nursing Notes. I have reviewed the NPO Status.   I have reviewed the Medications.     Review of Systems  Anesthesia Hx:  No problems with previous Anesthesia             Denies Family Hx of Anesthesia complications.    Denies Personal Hx of Anesthesia complications.                    Social:  No Alcohol Use, Non-Smoker       Hematology/Oncology:  Hematology Normal   Oncology Normal                                   EENT/Dental:  EENT/Dental Normal           Cardiovascular:  Cardiovascular Normal                   Hx of DVT                         Renal/:  Renal/ Normal                 Hepatic/GI:  Hepatic/GI Normal                 Neurological:  Neurology Normal                                      Endocrine:  Endocrine Normal            Psych:  Psychiatric Normal                    Physical Exam  General: Oriented, Alert, Cooperative and Well nourished    Airway:  Mallampati: II / I  Mouth Opening: Normal  TM Distance: Normal  Tongue: Normal  Neck ROM: Normal ROM    Dental:  Edentulous        Anesthesia Plan  Type of Anesthesia, risks & benefits discussed:    Anesthesia Type: Gen Natural Airway  Intra-op Monitoring Plan: Standard ASA Monitors  Induction:  IV  Informed Consent: Informed consent signed with the Patient and all parties understand the risks and agree with anesthesia plan.  All questions answered. Patient consented to blood products? No  ASA Score: 2    Ready For Surgery From Anesthesia Perspective.     .

## 2024-02-23 NOTE — H&P
Short Stay Endoscopy History and Physical    PCP - Vivi Stiles MD    Procedure - Colonoscopy  ASA - per anesthesia  Mallampati - per anesthesia  History of Anesthesia problems - no  Family history Anesthesia problems - no   Plan of anesthesia - General    HPI:  This is a 63 y.o. female here for evaluation of : asymptomatic screening exam      ROS:  Constitutional: No fevers, chills, No weight loss  CV: No chest pain  Pulm: No cough, No shortness of breath  GI: see HPI  Derm: No rash    Medical History:  has a past medical history of Allergy, Fibrocystic breast, and History of cholecystectomy.    Surgical History:  has a past surgical history that includes Hernia repair; Vaginal delivery; Cholecystectomy; Biopsy (10/28/2020); Breast biopsy (Right, 10/28/2020); and Esophagogastroduodenoscopy (N/A, 12/28/2023).    Family History: family history includes Cancer in her mother; Clotting disorder in her son; Diabetes in her brother, father, and sister; Endometrial cancer in her maternal grandmother and mother; No Known Problems in her sister and son; Stroke in her brother.. Otherwise no colon cancer, inflammatory bowel disease, or GI malignancies.    Social History:  reports that she has never smoked. She has never used smokeless tobacco. She reports that she does not drink alcohol and does not use drugs.    Review of patient's allergies indicates:   Allergen Reactions    Ibuprofen Other (See Comments)     Leg and hand       Medications:   Medications Prior to Admission   Medication Sig Dispense Refill Last Dose    aspirin (ECOTRIN) 81 MG EC tablet Take 81 mg by mouth once.    2/22/2024    acetaminophen (TYLENOL) 500 MG tablet Take 500 mg by mouth daily as needed for Pain.       azelastine (ASTELIN) 137 mcg (0.1 %) nasal spray 1 spray (137 mcg total) by Nasal route 2 (two) times daily. 15 mL 2     loratadine (CLARITIN) 10 mg tablet Take 1 tablet (10 mg total) by mouth daily as needed. 30 tablet 0     rivaroxaban  (XARELTO DVT-PE TREAT 30D START) 15 mg (42)- 20 mg (9) tablet dose pack Take 1 tablet (15 mg) by mouth twice daily with food for 21 days followed by 1 tablet (20 mg) by mouth once daily with food 1 each 0     [START ON 3/16/2024] rivaroxaban (XARELTO) 20 mg Tab Take 1 tablet (20 mg total) by mouth daily with dinner or evening meal. 30 tablet 2     triamcinolone (NASACORT) 55 mcg nasal inhaler 2 sprays by Nasal route once daily.            Physical Exam:    Vital Signs:   Vitals:    02/23/24 0844   BP: 127/60   Pulse: 68   Resp: 18   Temp: 98.5 °F (36.9 °C)       Gen: NAD, lying comfortably  HENT: NCAT, oropharynx clear  Eyes: anicteric sclerae, EOMI grossly  Neck: supple, no visible masses/goiter  Cardiac: RRR  Lungs: non-labored breathing  Abd: soft, NT/ND, normoactive BS  Ext: no LE edema, warm, well perfused  Skin: skin intact on exposed body parts, no visible rashes, lesions  Neuro: A&Ox4, neuro exam grossly intact, moves all extremities  Psych: appropriate mood, affect        Labs:  Lab Results   Component Value Date    WBC 3.77 (L) 06/27/2023    HGB 13.7 06/27/2023    HCT 42 06/27/2023     06/27/2023    CHOL 206 (H) 10/31/2022    TRIG 69 10/31/2022    HDL 77 (H) 10/31/2022    ALT 16 06/27/2023    AST 18 06/27/2023     06/27/2023    K 4.6 06/27/2023     06/27/2023    CREATININE 0.7 06/27/2023    BUN 9 06/27/2023    CO2 28 06/27/2023    TSH 1.460 06/06/2023    HGBA1C 5.6 06/06/2023       Plan:  Colonoscopy for colon cancer screening.    I have explained the risks and benefits of endoscopy procedures to the patient including but not limited to bleeding, perforation, infection, and death.  The patient was asked if they understand and allowed to ask any further questions to their satisfaction.    Michelle Baker MD

## 2024-02-23 NOTE — TRANSFER OF CARE
Anesthesia Transfer of Care Note    Patient: Cloie Bodden Reyes    Procedure(s) Performed: Procedure(s) (LRB):  COLONOSCOPY (N/A)    Patient location: GI    Anesthesia Type: general    Transport from OR: Transported from OR on room air with adequate spontaneous ventilation    Post pain: adequate analgesia    Post assessment: no apparent anesthetic complications    Post vital signs: stable    Level of consciousness: lethargic    Nausea/Vomiting: no nausea/vomiting    Complications: none    Transfer of care protocol was followed      Last vitals: Visit Vitals  BP (!) 107/56 (BP Location: Left arm, Patient Position: Lying)   Pulse 72   Temp 36.4 °C (97.5 °F) (Oral)   Resp 10   LMP 05/11/2017 (Approximate)   SpO2 100%   Breastfeeding No

## 2024-02-27 LAB
FINAL PATHOLOGIC DIAGNOSIS: NORMAL
GROSS: NORMAL
Lab: NORMAL

## 2024-03-12 ENCOUNTER — HOSPITAL ENCOUNTER (EMERGENCY)
Facility: HOSPITAL | Age: 64
Discharge: HOME | End: 2024-03-12
Attending: EMERGENCY MEDICINE
Payer: COMMERCIAL

## 2024-03-12 ENCOUNTER — TELEPHONE (OUTPATIENT)
Dept: OPHTHALMOLOGY | Facility: CLINIC | Age: 64
End: 2024-03-12
Payer: COMMERCIAL

## 2024-03-12 VITALS
WEIGHT: 168 LBS | BODY MASS INDEX: 26.37 KG/M2 | OXYGEN SATURATION: 99 % | RESPIRATION RATE: 20 BRPM | HEIGHT: 67 IN | DIASTOLIC BLOOD PRESSURE: 73 MMHG | HEART RATE: 71 BPM | SYSTOLIC BLOOD PRESSURE: 120 MMHG | TEMPERATURE: 98 F

## 2024-03-12 DIAGNOSIS — H00.011 HORDEOLUM EXTERNUM OF RIGHT UPPER EYELID: Primary | ICD-10-CM

## 2024-03-12 PROCEDURE — 99283 EMERGENCY DEPT VISIT LOW MDM: CPT | Mod: ER

## 2024-03-12 RX ORDER — ACETAMINOPHEN 500 MG
500 TABLET ORAL EVERY 6 HOURS PRN
Qty: 30 TABLET | Refills: 0 | OUTPATIENT
Start: 2024-03-12 | End: 2024-04-04

## 2024-03-12 RX ORDER — ERYTHROMYCIN 5 MG/G
OINTMENT OPHTHALMIC
Qty: 3.5 G | Refills: 0 | Status: SHIPPED | OUTPATIENT
Start: 2024-03-12

## 2024-03-12 NOTE — TELEPHONE ENCOUNTER
----- Message from Daly Arreola sent at 3/12/2024  9:29 AM CDT -----  Consult/Advisory    Name Of Caller:Ciro Larkin Preference:[phone]    Nature of call: Ptn called regarding swelling and redness in the right eye

## 2024-03-12 NOTE — ED PROVIDER NOTES
Encounter Date: 3/12/2024    SCRIBE #1 NOTE: I, Genoveva Velasquez, am scribing for, and in the presence of,  Muriel Delgadillo DO.       History     Chief Complaint   Patient presents with    Eye Problem     Patient presents w/ a c/o of lower/upper right eyelid swelling since yesterday. Denies any foreign objects in eye, or insect bite near the eye. Denies any vision complaints at this time. States that the swelling has improved.      63 y.o. female with no pertinent PMHx presents to the ED with right-sided periorbital swelling since yesterday. She reports a right temporal headache today. She states swelling has significantly gone down today after applying heat with a hot tea bag and aspirin. No other exacerbating or alleviating factors. Denies visual disturbances, light-headedness, fever, nausea, vomiting, CP, SOB, or other associated symptoms.       The history is provided by the patient. No  was used.     Review of patient's allergies indicates:   Allergen Reactions    Ibuprofen Other (See Comments)     Leg and hand     Past Medical History:   Diagnosis Date    Allergy     Fibrocystic breast     History of cholecystectomy      Past Surgical History:   Procedure Laterality Date    BIOPSY  10/28/2020    BREAST BIOPSY Right 10/28/2020    Benign fibrotic breast tissue with fibroadenomatoid change and duct ectasia    CHOLECYSTECTOMY      COLONOSCOPY N/A 2/23/2024    Procedure: COLONOSCOPY;  Surgeon: Michelle Baker MD;  Location: [facility];  Service: Endoscopy;  Laterality: N/A;  Referral: Vivi Stiles MD / Deo / Inst portal / LW  2/14 pt rescheduled, Suprep, portal -ml  2/16- pt states she is not starting her Xarelto until after her colonoscopy / pc complete. DBM    ESOPHAGOGASTRODUODENOSCOPY N/A 12/28/2023    Procedure: EGD (ESOPHAGOGASTRODUODENOSCOPY);  Surgeon: Tom Landeros MD;  Location: [facility];  Service: Endoscopy;  Laterality: N/A;    HERNIA REPAIR      VAGINAL DELIVERY       Family  History   Problem Relation Age of Onset    Cancer Mother         kidney    Endometrial cancer Mother     Diabetes Father     Diabetes Sister     Stroke Brother     Clotting disorder Son     Diabetes Brother     No Known Problems Sister     No Known Problems Son     Endometrial cancer Maternal Grandmother     Amblyopia Neg Hx     Blindness Neg Hx     Cataracts Neg Hx     Glaucoma Neg Hx     Macular degeneration Neg Hx     Retinal detachment Neg Hx     Strabismus Neg Hx      Social History     Tobacco Use    Smoking status: Never    Smokeless tobacco: Never   Substance Use Topics    Alcohol use: No     Alcohol/week: 0.0 standard drinks of alcohol    Drug use: No     Review of Systems   Constitutional:  Negative for fever.   HENT:  Negative for rhinorrhea and sore throat.    Eyes:  Negative for pain, discharge and visual disturbance.        (+) periorbital swelling, right   Respiratory:  Negative for shortness of breath.    Cardiovascular:  Negative for chest pain and leg swelling.   Gastrointestinal:  Negative for abdominal pain, diarrhea, nausea and vomiting.   Musculoskeletal:  Negative for back pain.   Skin:  Negative for rash.   Neurological:  Positive for headaches. Negative for syncope and light-headedness.   All other systems reviewed and are negative.      Physical Exam     Initial Vitals [03/12/24 1230]   BP Pulse Resp Temp SpO2   120/73 71 20 98.2 °F (36.8 °C) 99 %      MAP       --         Physical Exam    Nursing note and vitals reviewed.  Constitutional: She appears well-developed and well-nourished.   HENT:   Head: Normocephalic and atraumatic.   Right Ear: External ear normal.   Left Ear: External ear normal.   Nose: Nose normal.   Mouth/Throat: Oropharynx is clear and moist.   Eyes: Conjunctivae and EOM are normal. Pupils are equal, round, and reactive to light.   Erythema to the R upper eyelid with some localized swelling approx. 3mm.   Neck: Phonation normal. Neck supple.   Normal range of  motion.  Cardiovascular:  Normal rate, regular rhythm, normal heart sounds and intact distal pulses.     Exam reveals no gallop and no friction rub.       No murmur heard.  Pulmonary/Chest: Effort normal and breath sounds normal. No stridor. No respiratory distress. She has no wheezes. She has no rhonchi. She has no rales. She exhibits no tenderness.   Abdominal: Abdomen is soft. Bowel sounds are normal. She exhibits no distension. There is no abdominal tenderness. There is no rigidity, no rebound and no guarding.   Musculoskeletal:         General: No tenderness or edema. Normal range of motion.      Cervical back: Normal range of motion and neck supple.     Neurological: She is alert and oriented to person, place, and time. She has normal strength. No cranial nerve deficit or sensory deficit. GCS score is 15. GCS eye subscore is 4. GCS verbal subscore is 5. GCS motor subscore is 6.   Skin: Skin is warm and dry. Capillary refill takes less than 2 seconds. No rash noted.   Psychiatric: She has a normal mood and affect. Her behavior is normal.       Patient gave consent to have physical exam performed.      ED Course   Procedures  Labs Reviewed - No data to display       Imaging Results    None          Medications - No data to display  Medical Decision Making  Risk  OTC drugs.  Prescription drug management.    Medical Decision Making:    This is an evaluation of a 63 y.o. female that presents to the Emergency Department for periorbital swelling  Chief Complaint   Patient presents with    Eye Problem     Patient presents w/ a c/o of lower/upper right eyelid swelling since yesterday. Denies any foreign objects in eye, or insect bite near the eye. Denies any vision complaints at this time. States that the swelling has improved.          The patient is a non-toxic and well appearing patient. On physical exam, patient appears well hydrated with moist mucus membranes. Breath sounds are clear and equal bilaterally with no  adventitious breath sounds, tachypnea or respiratory distress. Regular rate and rhythm. No murmurs. Abdomen soft and non tender. Patient is tolerating PO without difficulty. Erythema to the R upper eyelid with some localized swelling approx. 3mm.Physical exam otherwise as above.     I have reviewed vital signs and nursing notes.   Vital Signs Are Reassuring.     Based on the patient's symptoms, I am considering and evaluating for the following differential diagnoses: hordeolum, conjunctivitis, viral conjunctivitis, bacterial conjunctivitis      ED Course:Treatment in the ED included Physical Exam and medications given in ED  Medications - No data to display.   Patient reports feeling better after treatment in the ER.       External Data/Documents Reviewed: Previous medical records and vital signs reviewed, see HPI and Physical exam.       Risk  Diagnosis or treatment significantly limited by the following social determinants of health: Body mass index is 26.31 kg/m².     In shared decision making with the patient, we discussed treatment, prescriptions, labs, and imaging results.    Discharge home with   ED Prescriptions       Medication Sig Dispense Start Date End Date Auth. Provider    erythromycin (ROMYCIN) ophthalmic ointment Place a 1/2 inch ribbon of ointment into the upper eyelid 5 times a day. 3.5 g 3/12/2024 -- Muriel Delgadillo, DO    acetaminophen (TYLENOL) 500 MG tablet Take 1 tablet (500 mg total) by mouth every 6 (six) hours as needed for Pain (and fever). 30 tablet 3/12/2024 -- Muriel Delgadillo, DO          Fill and take prescriptions as directed.  Return to the ED if symptoms worsen or do not resolve.   Answered questions and discussed discharge plan.    Patient feels better and is ready for discharge.  Follow up with PCP/specialist in 1 day        At time of discharge patient is awake alert oriented x4 speaking clearly in full sentences and moving all 4 extremities.     The following labs and imaging were  reviewed:        No visits with results within 1 Day(s) from this visit.   Latest known visit with results is:   Admission on 02/23/2024, Discharged on 02/23/2024   Component Date Value Ref Range Status    Final Pathologic Diagnosis 02/23/2024    Final                    Value:1.  Transverse colon polyp, biopsy:  Tubular adenoma (s)    2.  Descending colon polyp, biopsy:  Colonic mucosa with minimal surface hyperplastic change      Interp By Anna Lerner D.O., Signed on 02/27/2024 at 11:33    Gross 02/23/2024    Final                    Value:1: Container Label: Surgery MRN:  [id number] and Pathology MRN:  [id number]  Received in formalin labeled  &quot;transverse colon polyp x4&quot; and consists of 4 brown tan soft tissue fragments measuring 0.3 cm, 0.3 cm, 0.2 cm and less than 0.1 cm in greatest dimension. Entirely submitted in cassette [facility]--1A.     2: Container Label: Surgery MRN:  [id number] and Pathology MRN:  [id number]  Received in formalin labeled  &quot;descending colon polyp&quot; and consists of 1 brown tan soft tissue fragment measuring 0.3 x 0.3 x 0.2 cm. Entirely submitted in cassette [facility]--2A.  Whitleycandy Gurdeep      Disclaimer 02/23/2024 Unless the case is a 'gross only' or additional testing only, the final diagnosis for each specimen is based on a microscopic examination of appropriate tissue sections.   Final        Imaging Results    None               Scribe Attestation:   Scribe #1: I performed the above scribed service and the documentation accurately describes the services I performed. I attest to the accuracy of the note.                            I, Dr. Muriel Delgadillo, personally performed the services described in this documentation. This document was produced by a scribe under my direction and in my presence. All medical record entries made by the scribe were at my direction and in my presence.  I have reviewed the chart and agree that the record reflects my personal performance  and is accurate and complete. Muriel Delgadillo DO.     03/12/2024 6:14 PM      Clinical Impression:  Final diagnoses:  [H00.011] Hordeolum externum of right upper eyelid (Primary)          ED Disposition Condition    Discharge Stable          ED Prescriptions       Medication Sig Dispense Start Date End Date Auth. Provider    erythromycin (ROMYCIN) ophthalmic ointment Place a 1/2 inch ribbon of ointment into the upper eyelid 5 times a day. 3.5 g 3/12/2024 -- Muriel Delgadillo DO    acetaminophen (TYLENOL) 500 MG tablet Take 1 tablet (500 mg total) by mouth every 6 (six) hours as needed for Pain (and fever). 30 tablet 3/12/2024 -- Muriel Delgadillo DO          Follow-up Information    None          Muriel Delgadillo DO  03/12/24 1074

## 2024-03-13 ENCOUNTER — TELEPHONE (OUTPATIENT)
Dept: OPHTHALMOLOGY | Facility: CLINIC | Age: 64
End: 2024-03-13
Payer: COMMERCIAL

## 2024-03-13 NOTE — TELEPHONE ENCOUNTER
----- Message from Daly Arreola sent at 3/13/2024  4:01 PM CDT -----  Consult/Advisory    Name Of Caller:Ciro       Contact Preference:[phone]    Nature of call:ptn right eye is swollen and the outside of the eye is red she stated ,it has her cheek swollen as well.she was seen at the er on yesterday she stated called yesterday and was told they couldn't get her in she stated if anything is avail can it be at the [facility] location

## 2024-03-14 ENCOUNTER — TELEPHONE (OUTPATIENT)
Dept: OPHTHALMOLOGY | Facility: CLINIC | Age: 64
End: 2024-03-14
Payer: COMMERCIAL

## 2024-03-14 NOTE — TELEPHONE ENCOUNTER
----- Message from Daly Arreola sent at 3/14/2024  4:22 PM CDT -----  Consult/Advisory    Name Of Caller:Ciro       Contact Preference:[phone]    Nature of call: Ptn didn't know you had call her on yesterday please call ptn to assist

## 2024-03-15 ENCOUNTER — TELEPHONE (OUTPATIENT)
Dept: OPHTHALMOLOGY | Facility: CLINIC | Age: 64
End: 2024-03-15
Payer: COMMERCIAL

## 2024-03-15 NOTE — TELEPHONE ENCOUNTER
----- Message from Corry Hartley sent at 3/15/2024  9:39 AM CDT -----  Regarding: Urgent Appt  Patient called in regards to being seen today at [facility]  due to having a lot of pain.    Please call back to further assist- [phone]

## 2024-03-19 ENCOUNTER — OFFICE VISIT (OUTPATIENT)
Dept: OPTOMETRY | Facility: CLINIC | Age: 64
End: 2024-03-19
Payer: COMMERCIAL

## 2024-03-19 DIAGNOSIS — H00.011 HORDEOLUM EXTERNUM OF RIGHT UPPER EYELID: Primary | ICD-10-CM

## 2024-03-19 PROCEDURE — 99999 PR PBB SHADOW E&M-EST. PATIENT-LVL II: CPT | Mod: PBBFAC,,, | Performed by: OPTOMETRIST

## 2024-03-19 PROCEDURE — 99203 OFFICE O/P NEW LOW 30 MIN: CPT | Mod: S$GLB,,, | Performed by: OPTOMETRIST

## 2024-03-19 PROCEDURE — 1159F MED LIST DOCD IN RCRD: CPT | Mod: CPTII,S$GLB,, | Performed by: OPTOMETRIST

## 2024-03-19 PROCEDURE — 1160F RVW MEDS BY RX/DR IN RCRD: CPT | Mod: CPTII,S$GLB,, | Performed by: OPTOMETRIST

## 2024-03-19 RX ORDER — CEPHALEXIN 500 MG/1
500 CAPSULE ORAL 2 TIMES DAILY
COMMUNITY
Start: 2024-03-16

## 2024-03-19 RX ORDER — NEOMYCIN SULFATE, POLYMYXIN B SULFATE, AND DEXAMETHASONE 3.5; 10000; 1 MG/G; [USP'U]/G; MG/G
OINTMENT OPHTHALMIC
COMMUNITY
Start: 2024-03-16

## 2024-03-19 NOTE — PROGRESS NOTES
Subjective:       Patient ID: Cloie Bodden Reyes is a 63 y.o. female      Chief Complaint   Patient presents with    Eye Problem     History of Present Illness  Dls: 1 yr     62 y/o female presents today with c/o 1 week large bump RUL pt was seen  ER on lapalco was given catina od QID pt states decrease in swelling still has a large bump on RUL some mucous od no pain. Pt seen by outside ECP x 3 days ago and given oral keflex and dexacine catina.     Eye meds  Sergio/poly/ dex OD QID   Cephalexin PO        Assessment/Plan:     1. Hordeolum externum of right upper eyelid  Hordeolum RUL. Pt states has improved some since onset. Was seen by outside ECP and given oral keflex and dexacine catina. Pt advised to finish full course of keflex, can use catina TID RUL. Recommend warm compresses bid x 5-10 minutes. If does not fully resolve and refer for excision.     Follow up if symptoms worsen or fail to improve.

## 2024-03-26 ENCOUNTER — OFFICE VISIT (OUTPATIENT)
Dept: HEMATOLOGY/ONCOLOGY | Facility: CLINIC | Age: 64
End: 2024-03-26
Payer: COMMERCIAL

## 2024-03-26 VITALS
WEIGHT: 177.25 LBS | OXYGEN SATURATION: 100 % | SYSTOLIC BLOOD PRESSURE: 117 MMHG | TEMPERATURE: 98 F | RESPIRATION RATE: 16 BRPM | BODY MASS INDEX: 27.82 KG/M2 | DIASTOLIC BLOOD PRESSURE: 63 MMHG | HEIGHT: 67 IN | HEART RATE: 69 BPM

## 2024-03-26 DIAGNOSIS — Z86.718 HISTORY OF DVT (DEEP VEIN THROMBOSIS): Primary | ICD-10-CM

## 2024-03-26 DIAGNOSIS — I80.9 THROMBOPHLEBITIS: ICD-10-CM

## 2024-03-26 DIAGNOSIS — D68.51 HOMOZYGOUS FACTOR V LEIDEN MUTATION: ICD-10-CM

## 2024-03-26 PROCEDURE — 3078F DIAST BP <80 MM HG: CPT | Mod: CPTII,S$GLB,, | Performed by: INTERNAL MEDICINE

## 2024-03-26 PROCEDURE — 3008F BODY MASS INDEX DOCD: CPT | Mod: CPTII,S$GLB,, | Performed by: INTERNAL MEDICINE

## 2024-03-26 PROCEDURE — 99213 OFFICE O/P EST LOW 20 MIN: CPT | Mod: S$GLB,,, | Performed by: INTERNAL MEDICINE

## 2024-03-26 PROCEDURE — 1159F MED LIST DOCD IN RCRD: CPT | Mod: CPTII,S$GLB,, | Performed by: INTERNAL MEDICINE

## 2024-03-26 PROCEDURE — 99999 PR PBB SHADOW E&M-EST. PATIENT-LVL III: CPT | Mod: PBBFAC,,, | Performed by: INTERNAL MEDICINE

## 2024-03-26 PROCEDURE — 3074F SYST BP LT 130 MM HG: CPT | Mod: CPTII,S$GLB,, | Performed by: INTERNAL MEDICINE

## 2024-03-26 RX ORDER — RIVAROXABAN 15 MG-20MG
KIT ORAL
Qty: 1 EACH | Refills: 0 | Status: SHIPPED | OUTPATIENT
Start: 2024-03-26

## 2024-04-01 PROBLEM — D68.51 HOMOZYGOUS FACTOR V LEIDEN MUTATION: Status: ACTIVE | Noted: 2024-04-01

## 2024-04-01 NOTE — PROGRESS NOTES
"PATIENT: Cloie Bodden Reyes  MRN: [id number]  DATE: 3/26/2024      Subjective:     Chief complaint:  Chief Complaint   Patient presents with    thrombophlebitis    Follow-up       Heme Hx:  History of Present Illness: Ms. Reyes presents to establish care for HISTORY OF DVT.   62yo woman, notes approx 4 years ago she developed RLE DVT related to flying to [facility]. She reports that about 2-3 hours after her flight to [place] she felt pain and swelling in the RLE--she went to the doctor the following day and was diagnosed with a DVT. She notes getting "shots" for a couple weeks and symptoms improved; she has been on aspirin 81mg since that time. She does however note that for the past 2 weeks she has noticed increase in swelling and disocomfort in the RLE similar to prior DVT. Denies history of PE. Notes a son has been dealing with DVTs since his teenage years.    Interval History: Ms. Reyes returns for follow up on thrombophlebitis. She was prescribed Xarelto following our last appointment but apparently she didn't get that filled. She notes ongoing pain/discomfort/swelling in the right leg related to thrombophlebitis. No evidence of DVT and normal d-dimer. Thrombophilia workup notable for FVL homozygosity.      Review of Systems   A comprehensive review of systems was performed; pertinent positives and negatives are noted in the HPI.          Objective:      Vitals:   Vitals:    03/26/24 0956   BP: 117/63   BP Location: Left arm   Patient Position: Sitting   BP Method: Medium (Automatic)   Pulse: 69   Resp: 16   Temp: 97.8 °F (36.6 °C)   TempSrc: Oral   SpO2: 100%   Weight: 80.4 kg (177 lb 4 oz)   Height: 5' 7" (1.702 m)     BMI: Body mass index is 27.76 kg/m².      Physical Exam:   Physical Exam  Vitals and nursing note reviewed.   Constitutional:       General: She is not in acute distress.     Appearance: Normal appearance. She is not toxic-appearing.   HENT:      Head: Normocephalic and atraumatic.   Eyes:      " General: No scleral icterus.     Conjunctiva/sclera: Conjunctivae normal.   Cardiovascular:      Rate and Rhythm: Normal rate.   Pulmonary:      Effort: Pulmonary effort is normal. No respiratory distress.   Abdominal:      General: There is no distension.   Musculoskeletal:         General: No swelling or deformity.      Cervical back: Neck supple.   Skin:     Coloration: Skin is not jaundiced.      Findings: No erythema.   Neurological:      General: No focal deficit present.      Mental Status: She is alert and oriented to person, place, and time.      Motor: No weakness.   Psychiatric:         Mood and Affect: Mood normal.         Behavior: Behavior normal.           Laboratory Data:  WBC   Date Value Ref Range Status   06/27/2023 3.77 (L) 3.90 - 12.70 K/uL Final     Hemoglobin   Date Value Ref Range Status   06/27/2023 13.7 12.0 - 16.0 g/dL Final     POC Hematocrit   Date Value Ref Range Status   06/27/2023 42 36 - 54 %PCV Final     Hematocrit   Date Value Ref Range Status   06/27/2023 42.1 37.0 - 48.5 % Final     Platelets   Date Value Ref Range Status   06/27/2023 194 150 - 450 K/uL Final     Gran # (ANC)   Date Value Ref Range Status   06/27/2023 1.6 (L) 1.8 - 7.7 K/uL Final     Gran %   Date Value Ref Range Status   06/27/2023 43.3 38.0 - 73.0 % Final       Chemistry        Component Value Date/Time     06/27/2023 1130    K 4.6 06/27/2023 1130     06/27/2023 1130    CO2 28 06/27/2023 1130    BUN 9 06/27/2023 1130    CREATININE 0.7 06/27/2023 1130    GLU 86 06/27/2023 1130        Component Value Date/Time    CALCIUM 10.0 06/27/2023 1130    ALKPHOS 73 06/27/2023 1130    AST 18 06/27/2023 1130    ALT 16 06/27/2023 1130    BILITOT 0.4 06/27/2023 1130    ESTGFRAFRICA >60.0 09/30/2021 0820    EGFRNONAA >60.0 09/30/2021 0820        ontains abnormal data US Lower Extremity Veins Right  Order: [id number]  Status: Final result       Visible to patient: Yes (seen)       Next appt: 04/10/2024 at 09:30 AM in  Pulmonology (Evin Martins MD)       Dx: History of DVT (deep vein thrombosis)    0 Result Notes  Details    Reading Physician Reading Date Result Priority   Rishabh Lance MD  [phone] 2/14/2024 Routine     Narrative & Impression  EXAMINATION:  US LOWER EXTREMITY VEINS RIGHT     CLINICAL HISTORY:  Personal history of other venous thrombosis and embolism     TECHNIQUE:  Duplex and color flow Doppler evaluation and graded compression of the right lower extremity veins was performed.     COMPARISON:  None     FINDINGS:  Right thigh veins: The common femoral, femoral, popliteal, upper greater saphenous, and deep femoral veins are patent and free of thrombus. The veins are normally compressible and have normal phasic flow and augmentation response.     Right calf veins: The visualized calf veins are patent.     Contralateral CFV: The contralateral (left) common femoral vein is patent and free of thrombus.     Miscellaneous: Occlusive thrombus involving a superficial vein in the right medial calf.  The clot measures at least 3 cm in length.     Impression:     Superficial thrombophlebitis in the right medial calf.     No evidence of deep venous thrombosis in the right lower extremity.     This report was flagged in Epic as abnormal.        Electronically signed by: Rishabh Lance  Date:                                            02/14/2024  Time:                                           16:55           Exam Ended: 02/14/24 14:34 CST Last Resulted: 02/14/24 16:55 CST               Assessment/Plan:     1. History of DVT (deep vein thrombosis)    2. Thrombophlebitis    3. Homozygous Factor V Leiden mutation      She has symptomatic thrombophlebitis and was prescribed anticoagulation at last appointment however she states she did not ever get that prescription filled. She continues to have discomfort as a result of superficial thrombophlebitis of the right calf--will reorder xarelto and follow-up in 6  weeks.    Med and Orders:  Orders Placed This Encounter    rivaroxaban (XARELTO DVT-PE TREAT 30D START) 15 mg (42)- 20 mg (9) tablet dose pack       Follow Up:  Follow up in about 6 weeks (around 5/7/2024).      Above care plan was discussed with patient and all questions were addressed to their expressed satisfaction.       Lauri Shell MD, [facility]  Hematology & Medical Oncology  [facility]

## 2024-04-03 ENCOUNTER — TELEPHONE (OUTPATIENT)
Dept: HEMATOLOGY/ONCOLOGY | Facility: CLINIC | Age: 64
End: 2024-04-03
Payer: COMMERCIAL

## 2024-04-03 NOTE — TELEPHONE ENCOUNTER
Spoke with patient she said she is confused being her pharmacy filled her Xarelto 20 mg to take once a day then called her back for her to go get a different prescription which is 15 mg starter back to take twice a day. I went and verified with Dr. Shell and he said that she is to start with the starter pack and take 15 mg twice a day for 21 day and then after she is finished with that she starts the 20 mg once a day. I relayed the instructions to patient and she repeated it back to me to make sure she had it correct which she did. She will call back if she has any other questions.       ----- Message from Kimberly Haynes sent at 4/3/2024  9:59 AM CDT -----  .Type:  Needs Medical Advice    Who Called:  pt  Symptoms (please be specific):    How long has patient had these symptoms:    Pharmacy name and phone #:    Would the patient rather a call back or a response via [facility]?   Best Call Back Number:  [phone]  Additional Information: pt wants to speak to the office about the medicien arline that was given to her

## 2024-04-04 ENCOUNTER — HOSPITAL ENCOUNTER (EMERGENCY)
Facility: HOSPITAL | Age: 64
Discharge: HOME | End: 2024-04-04
Attending: EMERGENCY MEDICINE
Payer: COMMERCIAL

## 2024-04-04 VITALS
DIASTOLIC BLOOD PRESSURE: 82 MMHG | HEIGHT: 67 IN | OXYGEN SATURATION: 98 % | RESPIRATION RATE: 17 BRPM | WEIGHT: 163 LBS | TEMPERATURE: 100 F | SYSTOLIC BLOOD PRESSURE: 143 MMHG | HEART RATE: 94 BPM | BODY MASS INDEX: 25.58 KG/M2

## 2024-04-04 DIAGNOSIS — J10.1 INFLUENZA A: Primary | ICD-10-CM

## 2024-04-04 LAB
CTP QC/QA: YES
INFLUENZA A ANTIGEN, POC: POSITIVE
INFLUENZA B ANTIGEN, POC: NEGATIVE
POC RAPID STREP A: NEGATIVE
SARS-COV-2 RDRP RESP QL NAA+PROBE: NEGATIVE

## 2024-04-04 PROCEDURE — 25000003 PHARM REV CODE 250: Mod: ER | Performed by: PHYSICIAN ASSISTANT

## 2024-04-04 PROCEDURE — 87804 INFLUENZA ASSAY W/OPTIC: CPT | Mod: ER

## 2024-04-04 PROCEDURE — 99284 EMERGENCY DEPT VISIT MOD MDM: CPT | Mod: ER

## 2024-04-04 PROCEDURE — 87880 STREP A ASSAY W/OPTIC: CPT | Mod: ER

## 2024-04-04 PROCEDURE — 87635 SARS-COV-2 COVID-19 AMP PRB: CPT | Mod: ER | Performed by: EMERGENCY MEDICINE

## 2024-04-04 RX ORDER — OSELTAMIVIR PHOSPHATE 75 MG/1
75 CAPSULE ORAL 2 TIMES DAILY
Qty: 10 CAPSULE | Refills: 0 | Status: SHIPPED | OUTPATIENT
Start: 2024-04-04 | End: 2024-04-09

## 2024-04-04 RX ORDER — GUAIFENESIN 100 MG/5ML
400 SOLUTION ORAL EVERY 4 HOURS PRN
Qty: 180 ML | Refills: 0 | Status: SHIPPED | OUTPATIENT
Start: 2024-04-04 | End: 2024-04-14

## 2024-04-04 RX ORDER — MONTELUKAST SODIUM 10 MG/1
10 TABLET ORAL NIGHTLY
Qty: 30 TABLET | Refills: 0 | Status: SHIPPED | OUTPATIENT
Start: 2024-04-04 | End: 2024-05-04

## 2024-04-04 RX ORDER — ACETAMINOPHEN 500 MG
500 TABLET ORAL EVERY 4 HOURS PRN
Qty: 20 TABLET | Refills: 0 | Status: SHIPPED | OUTPATIENT
Start: 2024-04-04 | End: 2024-04-09

## 2024-04-04 RX ORDER — ACETAMINOPHEN 500 MG
500 TABLET ORAL
Status: COMPLETED | OUTPATIENT
Start: 2024-04-04 | End: 2024-04-04

## 2024-04-04 RX ORDER — BENZONATATE 100 MG/1
100 CAPSULE ORAL 3 TIMES DAILY PRN
Qty: 20 CAPSULE | Refills: 0 | Status: SHIPPED | OUTPATIENT
Start: 2024-04-04 | End: 2024-04-11

## 2024-04-04 RX ORDER — FLUTICASONE PROPIONATE 50 MCG
1 SPRAY, SUSPENSION (ML) NASAL 2 TIMES DAILY PRN
Qty: 15 G | Refills: 0 | Status: SHIPPED | OUTPATIENT
Start: 2024-04-04 | End: 2024-05-04

## 2024-04-04 RX ADMIN — ACETAMINOPHEN 500 MG: 500 TABLET ORAL at 04:04

## 2024-04-04 NOTE — DISCHARGE INSTRUCTIONS

## 2024-04-04 NOTE — Clinical Note
"Cloie "Cloie" Reyes was seen and treated in our emergency department on 4/4/2024.     COVID-19 is present in our communities across the state. There is limited testing for COVID at this time, so not all patients can be tested. In this situation, your employee meets the following criteria:    Cloie Bodden Reyes has met the criteria for COVID-19 testing and has a NEGATIVE result. The employee can return to work once they are asymptomatic for 24 hours without the use of fever reducing medications (Tylenol, Motrin, etc).     If the employee is not fully vaccinated and had a close contact:  · Retest at 5 to 7 days post-exposure  · If possible, it is recommended that they quarantine for 5 days from the time of contact regardless of their test status.  · A mask should be worn post quarantine for 5 days.    If you have any questions or concerns, or if I can be of further assistance, please do not hesitate to contact me.    Sincerely,             Mahogany Torrez PA-C"

## 2024-04-04 NOTE — ED PROVIDER NOTES
Encounter Date: 4/4/2024    SCRIBE #1 NOTE: I, Flavio Guzman Do, am scribing for, and in the presence of,  Mahogany Torrez PA-C. I have scribed the following portions of the note - Other sections scribed: HPI, ROS, PE.       History     Chief Complaint   Patient presents with    Fever     Pt reports fever and body aches since last night. Last taken tylenol PTA. Denies CP, SOB, or cough     CC: URI concerns    HPI: 62 yo female with a PMHx of DVT, presents to the ED with URI concerns onset yesterday night. Patient reports associated fever, fatigue, congestion, rhinorrhea, mild sore throat, and postnasal drip. She denies recent sick contact. No other exacerbating or alleviating factors. Patient denies general myalgia, cough, nausea, emesis, diarrhea, chest pain, abdominal pain, dysuria, or other associated symptoms.     She also reports right leg myalgia from a previous DVT. She notes compliance with tylenol and Xarelto 15mg.     The history is provided by the patient. No  was used.     Review of patient's allergies indicates:   Allergen Reactions    Ibuprofen Other (See Comments)     Leg and hand     Past Medical History:   Diagnosis Date    Allergy     Fibrocystic breast     History of cholecystectomy      Past Surgical History:   Procedure Laterality Date    BIOPSY  10/28/2020    BREAST BIOPSY Right 10/28/2020    Benign fibrotic breast tissue with fibroadenomatoid change and duct ectasia    CHOLECYSTECTOMY      COLONOSCOPY N/A 2/23/2024    Procedure: COLONOSCOPY;  Surgeon: Michelle Baker MD;  Location: [facility];  Service: Endoscopy;  Laterality: N/A;  Referral: Vivi Stiles MD / Deo / Inst portal / LW  2/14 pt rescheduled, Suprep, portal -ml  2/16- pt states she is not starting her Xarelto until after her colonoscopy / pc complete. DBM    ESOPHAGOGASTRODUODENOSCOPY N/A 12/28/2023    Procedure: EGD (ESOPHAGOGASTRODUODENOSCOPY);  Surgeon: Tom Landeros MD;  Location: [facility];   Service: Endoscopy;  Laterality: N/A;    HERNIA REPAIR      VAGINAL DELIVERY       Family History   Problem Relation Age of Onset    Cancer Mother         kidney    Endometrial cancer Mother     Diabetes Father     Diabetes Sister     Stroke Brother     Clotting disorder Son     Diabetes Brother     No Known Problems Sister     No Known Problems Son     Endometrial cancer Maternal Grandmother     Amblyopia Neg Hx     Blindness Neg Hx     Cataracts Neg Hx     Glaucoma Neg Hx     Macular degeneration Neg Hx     Retinal detachment Neg Hx     Strabismus Neg Hx      Social History     Tobacco Use    Smoking status: Never    Smokeless tobacco: Never   Substance Use Topics    Alcohol use: No     Alcohol/week: 0.0 standard drinks of alcohol    Drug use: No     Review of Systems   Constitutional:  Positive for fatigue and fever. Negative for chills and diaphoresis.   HENT:  Positive for congestion, postnasal drip, rhinorrhea and sore throat.    Eyes:  Negative for photophobia and visual disturbance.   Respiratory:  Negative for cough and shortness of breath.    Cardiovascular:  Negative for chest pain and leg swelling.   Gastrointestinal:  Negative for abdominal pain, blood in stool, constipation, diarrhea, nausea and vomiting.   Genitourinary:  Negative for dysuria, frequency, hematuria and urgency.   Musculoskeletal:  Positive for myalgias (Right leg). Negative for neck pain and neck stiffness.   Skin:  Negative for rash and wound.   Neurological:  Negative for weakness, light-headedness, numbness and headaches.   Psychiatric/Behavioral:  Negative for confusion and suicidal ideas.        Physical Exam     Initial Vitals [04/04/24 1626]   BP Pulse Resp Temp SpO2   (!) 143/82 94 17 99.5 °F (37.5 °C) 98 %      MAP       --         Physical Exam    Nursing note and vitals reviewed.  Constitutional: She appears well-developed and well-nourished.   HENT:   Head: Normocephalic.   Right Ear: External ear normal.   Left Ear:  External ear normal.   Nose: Nose normal.   Mouth/Throat: Uvula is midline. Posterior oropharyngeal erythema present. No oropharyngeal exudate.   Tolerates secretions. Postnasal Drip. No peritonsillar swelling.    Eyes: Conjunctivae are normal.   Cardiovascular:  Normal rate and regular rhythm.     Exam reveals no gallop and no friction rub.       No murmur heard.  Pulmonary/Chest: Breath sounds normal. She has no wheezes. She has no rhonchi. She has no rales.   Abdominal: Abdomen is soft. Bowel sounds are normal. She exhibits no distension. There is no abdominal tenderness. There is no rebound, no guarding, no tenderness at McBurney's point and negative Truong's sign.   Musculoskeletal:         General: Normal range of motion.     Lymphadenopathy:     She has no cervical adenopathy.   Neurological: She is alert. She has normal strength. No cranial nerve deficit or sensory deficit.   Skin: Skin is warm and dry.   Psychiatric: She has a normal mood and affect.         ED Course   Procedures  Labs Reviewed   POCT RAPID INFLUENZA A/B - Abnormal; Notable for the following components:       Result Value    Inflenza A Ag positive (*)     All other components within normal limits   SARS-COV-2 RDRP GENE    Narrative:     This test utilizes isothermal nucleic acid amplification technology to detect the SARS-CoV-2 RdRp nucleic acid segment. The analytical sensitivity (limit of detection) is 500 copies/swab.     A POSITIVE result is indicative of the presence of SARS-CoV-2 RNA; clinical correlation with patient history and other diagnostic information is necessary to determine patient infection status.    A NEGATIVE result means that SARS-CoV-2 nucleic acids are not present above the limit of detection. A NEGATIVE result should be treated as presumptive. It does not rule out the possibility of COVID-19 and should not be the sole basis for treatment decisions. If COVID-19 is strongly suspected based on clinical and exposure  history, re-testing using an alternate molecular assay should be considered.     Commercial kits are provided by [employer].   _________________________________________________________________   The authorized Fact Sheet for Healthcare Providers and the authorized Fact Sheet for Patients of the ID NOW COVID-19 are available on the FDA website:    https://www.fda.gov/media/474529/download      https://www.fda.gov/media/685876/download      POCT STREP A MOLECULAR   POCT INFLUENZA A/B MOLECULAR   POCT STREP A, RAPID          Imaging Results    None          Medications   rivaroxaban tablet 15 mg (has no administration in time range)   acetaminophen tablet 500 mg (500 mg Oral Given 4/4/24 1657)     Medical Decision Making  Pt is a 63 y.o. female  presenting for evaluation of 1 day history of URI symptoms as listed above    Pt's initial vitals ---------------------------               04/04/24                      1626         ---------------------------   BP:        (!) 143/82       Pulse:         94           Resp:          17           Temp:   99.5 °F (37.5 °C)  ---------------------------    In no distress.   Exam above.   Lungs ctab. Considered but doubt PNA.   Flu pos  Strep neg  Covid neg    No evidence of bacterial etiology of symptoms at this time.   Considered but doubt bacterial sinusitis. Abdomen soft nontender. No urinary symptoms.   Tolerating PO.    Given tylenol in the ED  Will discharge with meds for symptomatic treatment.   PCP follow up in 2 days. Return to ER for worsening or as needed.       Risk  OTC drugs.  Prescription drug management.            Scribe Attestation:   Scribe #1: I performed the above scribed service and the documentation accurately describes the services I performed. I attest to the accuracy of the note.              I, PALOMO Whitlock , personally performed the services described in this documentation.  All medical record entries made by the  eitan were at my direction and in my presence.  I have reviewed the chart and agree that the record reflects my personal performance and is accurate and complete.                   Clinical Impression:  Final diagnoses:  [J10.1] Influenza A (Primary)                 Mahogany Torrez PA-C  04/04/24 2033

## 2024-04-09 ENCOUNTER — TELEPHONE (OUTPATIENT)
Dept: HEMATOLOGY/ONCOLOGY | Facility: CLINIC | Age: 64
End: 2024-04-09
Payer: COMMERCIAL

## 2024-04-09 NOTE — TELEPHONE ENCOUNTER
Spoke with patient told her that Dr. Shell doesn't have anything sooner than 4/17 when she is leaving. I told her being she is scheduled as virtual she can leave it as is if she will be where she can talk for a few minutes which she said she will be so she will leave appt as is on 5/8.       ----- Message from Parris Higginbotham sent at 4/9/2024 10:40 AM CDT -----  Type:  Needs Medical Advice    Who Called: pt  Would the patient rather a call back or a response via [facility]? call  Best Call Back Number: [phone]   Additional Information:   Pt requesting a call regarding her appt on 05/08  Pt stated she's going out of town for a month and was wondering can she be seen sooner   Pt leaves on 04/17/24

## 2024-04-10 ENCOUNTER — OFFICE VISIT (OUTPATIENT)
Dept: PULMONOLOGY | Facility: CLINIC | Age: 64
End: 2024-04-10
Payer: COMMERCIAL

## 2024-04-10 VITALS
BODY MASS INDEX: 27.09 KG/M2 | HEIGHT: 67 IN | HEART RATE: 74 BPM | OXYGEN SATURATION: 98 % | SYSTOLIC BLOOD PRESSURE: 111 MMHG | DIASTOLIC BLOOD PRESSURE: 73 MMHG | WEIGHT: 172.63 LBS

## 2024-04-10 DIAGNOSIS — R29.818 SUSPECTED SLEEP APNEA: ICD-10-CM

## 2024-04-10 DIAGNOSIS — R09.81 NASAL CONGESTION: ICD-10-CM

## 2024-04-10 DIAGNOSIS — R06.83 PRIMARY SNORING: Primary | ICD-10-CM

## 2024-04-10 PROCEDURE — 3074F SYST BP LT 130 MM HG: CPT | Mod: CPTII,S$GLB,, | Performed by: INTERNAL MEDICINE

## 2024-04-10 PROCEDURE — 99999 PR PBB SHADOW E&M-EST. PATIENT-LVL V: CPT | Mod: PBBFAC,,, | Performed by: INTERNAL MEDICINE

## 2024-04-10 PROCEDURE — 3008F BODY MASS INDEX DOCD: CPT | Mod: CPTII,S$GLB,, | Performed by: INTERNAL MEDICINE

## 2024-04-10 PROCEDURE — 1159F MED LIST DOCD IN RCRD: CPT | Mod: CPTII,S$GLB,, | Performed by: INTERNAL MEDICINE

## 2024-04-10 PROCEDURE — 3078F DIAST BP <80 MM HG: CPT | Mod: CPTII,S$GLB,, | Performed by: INTERNAL MEDICINE

## 2024-04-10 PROCEDURE — 99203 OFFICE O/P NEW LOW 30 MIN: CPT | Mod: S$GLB,,, | Performed by: INTERNAL MEDICINE

## 2024-04-10 NOTE — PATIENT INSTRUCTIONS
1.  [facility] Sinus Rinse Regular Kit    Recipe 1/4 teaspoon of salt and 1/4 teaspoon of baking soda in distilled water.  Be sure to tilt head forward as shown in picture.          flonase or nasonex over the counter.  2 sprays per nostril daily. Be sure to tilt head forward when dispensing medication.

## 2024-04-10 NOTE — PROGRESS NOTES
Cloie Reyes  was seen as a new patient at the request of  Cherelle Moody PA* for the evaluation of  dante.    CHIEF COMPLAINT:    Chief Complaint   Patient presents with    Apnea       HISTORY OF PRESENT ILLNESS: Cloie Bodden Reyes is a 63 y.o. female is here for sleep evaluation.   Patient was seen in cardiology for AR and TR.  Patient was referred for dante evaluation due to h/o fatigue.      STOPBANG during initial visit:  Snore:  denied  Tire:  denied  Observed apnea:  denied  Pressure (HTN):  denied  BMI:  Body mass index is 27.04 kg/m².   Age:  63 y.o.  Neck (inch):  13.5  Gender:  female    Other sleep ROS:  denied parasomnia.  No cataplexy. No rls symptoms.  Recurrent dvt treated with eliquis.      Blount Sleepiness Scale score during initial sleep evaluation was 2.    SLEEP ROUTINE:  Activity the hour prior to sleep: watch tv in bed or den     Bed partner:    Time to bed:  10 pm   Lights off:  off  Sleep onset latency:  5 am         Disruptions or awakenings:    2 times for bathroom (no issue going back to sleep)    Wakeup time:      6 am   Perceived sleep quality:  rested       Daytime naps:      none  Weekend sleep routine:      same  Caffeine use: occasional coffee  exercise habit:   walk 1/2 mile daily.  Stopped due dvt       PAST MEDICAL HISTORY:    Active Ambulatory Problems     Diagnosis Date Noted    Bradycardia 06/07/2023    Weakness 06/07/2023    History of DVT in adulthood 02/14/2024    Homozygous Factor V Leiden mutation 04/01/2024    Primary snoring 04/10/2024    Nasal congestion 04/10/2024     Resolved Ambulatory Problems     Diagnosis Date Noted    No Resolved Ambulatory Problems     Past Medical History:   Diagnosis Date    Allergy     Fibrocystic breast     History of cholecystectomy                 PAST SURGICAL HISTORY:    Past Surgical History:   Procedure Laterality Date    BIOPSY  10/28/2020    BREAST BIOPSY Right 10/28/2020    Benign fibrotic breast tissue with  fibroadenomatoid change and duct ectasia    CHOLECYSTECTOMY      COLONOSCOPY N/A 2/23/2024    Procedure: COLONOSCOPY;  Surgeon: Michelle Baker MD;  Location: [facility];  Service: Endoscopy;  Laterality: N/A;  Referral: Vivi Stiles MD / Suprep / Inst portal / LW  2/14 pt rescheduled, Suprep, portal -ml  2/16- pt states she is not starting her Xarelto until after her colonoscopy / pc complete. DBM    ESOPHAGOGASTRODUODENOSCOPY N/A 12/28/2023    Procedure: EGD (ESOPHAGOGASTRODUODENOSCOPY);  Surgeon: Tom Landeros MD;  Location: [facility];  Service: Endoscopy;  Laterality: N/A;    HERNIA REPAIR      VAGINAL DELIVERY           FAMILY HISTORY:                Family History   Problem Relation Age of Onset    Cancer Mother         kidney    Endometrial cancer Mother     Diabetes Father     Diabetes Sister     Stroke Brother     Clotting disorder Son     Diabetes Brother     No Known Problems Sister     No Known Problems Son     Endometrial cancer Maternal Grandmother     Amblyopia Neg Hx     Blindness Neg Hx     Cataracts Neg Hx     Glaucoma Neg Hx     Macular degeneration Neg Hx     Retinal detachment Neg Hx     Strabismus Neg Hx        SOCIAL HISTORY:          Tobacco:   Social History     Tobacco Use   Smoking Status Never   Smokeless Tobacco Never       alcohol use:    Social History     Substance and Sexual Activity   Alcohol Use No    Alcohol/week: 0.0 standard drinks of alcohol                 Occupation:  retire  and school    ALLERGIES:    Review of patient's allergies indicates:   Allergen Reactions    Ibuprofen Other (See Comments)     Leg and hand       CURRENT MEDICATIONS:    Current Outpatient Medications   Medication Sig Dispense Refill    aspirin (ECOTRIN) 81 MG EC tablet Take 81 mg by mouth once.       benzonatate (TESSALON) 100 MG capsule Take 1 capsule (100 mg total) by mouth 3 (three) times daily as needed. 20 capsule 0    cephALEXin (KEFLEX) 500 MG capsule Take 500 mg by mouth 2  "(two) times daily.      erythromycin (ROMYCIN) ophthalmic ointment Place a 1/2 inch ribbon of ointment into the upper eyelid 5 times a day. 3.5 g 0    fluticasone propionate (FLONASE) 50 mcg/actuation nasal spray 1 spray (50 mcg total) by Each Nostril route 2 (two) times daily as needed for Rhinitis or Allergies. 15 g 0    guaiFENesin 100 mg/5 ml (ROBITUSSIN) 100 mg/5 mL syrup Take 20 mLs (400 mg total) by mouth every 4 (four) hours as needed for Cough or Congestion. 180 mL 0    montelukast (SINGULAIR) 10 mg tablet Take 1 tablet (10 mg total) by mouth every evening. 30 tablet 0    neomycin-polymyxin-dexamethasone (DEXACINE) 3.5 mg/g-10,000 unit/g-0.1 % Oint       rivaroxaban (XARELTO DVT-PE TREAT 30D START) 15 mg (42)- 20 mg (9) tablet dose pack Take 1 tablet (15 mg) by mouth twice daily with food for 21 days followed by 1 tablet (20 mg) by mouth once daily with food 1 each 0    rivaroxaban (XARELTO) 20 mg Tab Take 1 tablet (20 mg total) by mouth daily with dinner or evening meal. 30 tablet 2    triamcinolone (NASACORT) 55 mcg nasal inhaler 2 sprays by Nasal route once daily.      azelastine (ASTELIN) 137 mcg (0.1 %) nasal spray 1 spray (137 mcg total) by Nasal route 2 (two) times daily. 15 mL 2    loratadine (CLARITIN) 10 mg tablet Take 1 tablet (10 mg total) by mouth daily as needed. 30 tablet 0     No current facility-administered medications for this visit.                  REVIEW OF SYSTEMS:     Sleep related symptoms as per HPI.  CONST:Denies weight gain    HEENT: Denies sinus congestion  PULM: Denies dyspnea  CARD:  Denies palpitations   GI:  Denies acid reflux  : Denies polyuria  NEURO: Denies headaches  PSYCH: Denies mood disturbance  HEME: Denies anemia   Otherwise, a balance of systems reviewed is negative.          PHYSICAL EXAM:  Vitals:    04/10/24 0936   BP: 111/73   Pulse: 74   SpO2: 98%   Weight: 78.3 kg (172 lb 9.9 oz)   Height: 5' 7" (1.702 m)   PainSc: 0-No pain     Body mass index is 27.04 " kg/m².     GENERAL: Normal development, well groomed  HEENT:  Conjunctivae are non-erythematous; Pupils equal, round, and reactive to light; Nose is symmetrical; Nasal mucosa is pink and moist; Septum is midline; Inferior turbinates are normal; Nasal airflow is normal; Posterior pharynx is pink; Modified Mallampati: 3; Posterior palate is normal; Tonsils +1; Uvula is normal and pink;Tongue is normal; edentulous; No TMJ tenderness; Jaw opening and protrusion without click and without discomfort.  NECK: Supple. Neck circumference is 13.5 inches. No thyromegaly. No palpable nodes.     SKIN: On face and neck: No abrasions, no rashes, no lesions.  No subcutaneous nodules are palpable.  RESPIRATORY: Chest is clear to auscultation.  Normal chest expansion and non-labored breathing at rest.  CARDIOVASCULAR: Normal S1, S2.  No murmurs, gallops or rubs. No carotid bruits bilaterally.  EXTREMITIES: No edema. No clubbing. No cyanosis. Station normal. Gait normal.        NEURO/PSYCH: Oriented to time, place and person. Normal attention span and concentration. Affect is full. Mood is normal.                                              DATA no prior sleep study  Echo 6/7/23  The left ventricle is normal in size with concentric remodeling and normal systolic function.  The estimated ejection fraction is 55%.  Normal right ventricular size with normal right ventricular systolic function.  Mild aortic regurgitation.  Mild tricuspid regurgitation.  The estimated PA systolic pressure is 24 mmHg.         Lab Results   Component Value Date    TSH 1.460 06/06/2023       ASSESSMENT/PLAN    Problem List Items Addressed This Visit       Nasal congestion    Overview     Recent viral illness  Recommend sinus irriation         Primary snoring - Primary    Overview     much improved since weight loss.  Low pretest probability for dante per stopbang criteria.           Other Visit Diagnoses       Suspected sleep apnea                  Precautions:  The patient was advised to abstain from driving should they feel sleepy or drowsy.       Thank you for allowing me the opportunity to participate in the care of your patient.    Patient will No follow-ups on file.    Please cc note to  Cherelle Moody PA*.    35 minutes of total time spent on the encounter, which includes face to face time and non-face to face time preparing to see the patient (eg, review of tests), Obtaining and/or reviewing separately obtained history, documenting clinical information in the electronic or other health record, independently interpreting results (not separately reported) and communicating results to the patient/family/caregiver, or Care coordination (not separately reported).

## 2024-04-12 ENCOUNTER — TELEPHONE (OUTPATIENT)
Dept: HEMATOLOGY/ONCOLOGY | Facility: CLINIC | Age: 64
End: 2024-04-12
Payer: COMMERCIAL

## 2024-04-12 NOTE — TELEPHONE ENCOUNTER
Spoke with patient she said that every time she takes Xarelto she starts feeling sick to her stomach and unable to eat the rest of the day due to having nausea real bad. She does eat something prior to taking it so she isn't taking it on an empty stomach. She said today she hasn't taking it as of yet as feels good but will take it later today. She said she did go to the ER on 4/4 and tested positive for the flu and was given Tamiflu, Tessalon and Montelukast so she isn't sure if that's what started it. Trying to see if she can possibly be having side effects from the Xarelto. I told her that I will speak with Dr. Shell and let him know what's going ok and either myself or Dr. Shell will give her a call back. Patient thanked me for calling and will wait to hear back from one of us.      ----- Message from Criss Avila sent at 4/12/2024  1:36 PM CDT -----  Type:  Needs Medical Advice    Who Called:  Cloie Reyes  Symptoms (please be specific):  Upset stomach and vomiting    How long has patient had these symptoms:   Almost 2 weeks ago    Would the patient rather a call back or a response via [facility]?  call  Best Call Back Number:   [phone]  Additional Information:  Pt states that she is having side effects to the medication and would like a call back on what next steps to take.       rivaroxaban (XARELTO DVT-PE TREAT 30D START) 15 mg (42)- 20 mg (9) tablet dose pack [[id number]

## 2024-04-15 ENCOUNTER — OFFICE VISIT (OUTPATIENT)
Dept: HEMATOLOGY/ONCOLOGY | Facility: CLINIC | Age: 64
End: 2024-04-15
Payer: COMMERCIAL

## 2024-04-15 ENCOUNTER — TELEPHONE (OUTPATIENT)
Dept: HEMATOLOGY/ONCOLOGY | Facility: CLINIC | Age: 64
End: 2024-04-15
Payer: COMMERCIAL

## 2024-04-15 DIAGNOSIS — I80.9 THROMBOPHLEBITIS: Primary | ICD-10-CM

## 2024-04-15 DIAGNOSIS — Z86.718 HISTORY OF DVT (DEEP VEIN THROMBOSIS): ICD-10-CM

## 2024-04-15 PROCEDURE — 99213 OFFICE O/P EST LOW 20 MIN: CPT | Mod: 95,,, | Performed by: INTERNAL MEDICINE

## 2024-04-15 NOTE — TELEPHONE ENCOUNTER
----- Message from Krissy Anthony sent at 4/15/2024 11:45 AM CDT -----  Regarding: Consult/Advisory  Contact: REYES, CLOIE BODDEN [[id number]]     Consult/Advisory     Name Of Caller:Cloie Bodden Reyes         Contact Preference:[phone] (home)       Nature of call:Patient is calling for virtual audio appt she states she still waiting no one has come on yet. Patients artur is for 11.30 Requesting a call back

## 2024-04-17 NOTE — PROGRESS NOTES
"PATIENT: Cloie Bodden Reyes  MRN: [id number]  DATE: 4/15/2024    Audio Only Telehealth Visit     The patient location is: her home  The chief complaint leading to consultation is: thrombophlebitis, drug side-effect  Visit type: Virtual visit with audio only (telephone)  Total time spent with patient: 10 min     The reason for the audio only service rather than synchronous audio and video virtual visit was related to technical difficulties or patient preference/necessity.     Each patient to whom I provide medical services by telemedicine is:  (1) informed of the relationship between the physician and patient and the respective role of any other health care provider with respect to management of the patient; and (2) notified that they may decline to receive medical services by telemedicine and may withdraw from such care at any time. Patient verbally consented to receive this service via voice-only telephone call.     This service was not originating from a related E/M service provided within the previous 7 days nor will  to an E/M service or procedure within the next 24 hours or my soonest available appointment.  Prevailing standard of care was able to be met in this audio-only visit.        Subjective:     Chief complaint:  Chief Complaint   Patient presents with    Follow-up    thrombophlebitis       HEME History:   Ms. Reyes presents to establish care for HISTORY OF DVT.   62yo woman, notes approx 4 years ago she developed RLE DVT related to flying to [facility]. She reports that about 2-3 hours after her flight to [place] she felt pain and swelling in the RLE--she went to the doctor the following day and was diagnosed with a DVT. She notes getting "shots" for a couple weeks and symptoms improved; she has been on aspirin 81mg since that time. She does however note that for the past 2 weeks she has noticed increase in swelling and disocomfort in the RLE similar to prior DVT. Denies history of PE. Notes a son " has been dealing with DVTs since his teenage years.           Interval History: Ms. Reyes participates remotely in telemedicine follow-up today.   She had been advised (by me) to stop taking Xarelto following recent viral illness because she said taking Xarelto was causing severe nausea/vomiting lasting all day. She has been off it now a couple days and states she feels okay. The superficial thrombophlebitis for which she had been taking it has resolved. I advised that if the leg is not bothering her at this point and the medication is causing such side effects, I would advise she remain off it for now.       Review of Systems   A comprehensive review of systems was performed; pertinent positives and negatives are noted in the HPI.        Objective:      Vitals: There were no vitals filed for this visit.  BMI: There is no height or weight on file to calculate BMI.      Physical Exam:   No physical exam due to remote nature of the visit.        Laboratory Data:  WBC   Date Value Ref Range Status   06/27/2023 3.77 (L) 3.90 - 12.70 K/uL Final     Hemoglobin   Date Value Ref Range Status   06/27/2023 13.7 12.0 - 16.0 g/dL Final     POC Hematocrit   Date Value Ref Range Status   06/27/2023 42 36 - 54 %PCV Final     Hematocrit   Date Value Ref Range Status   06/27/2023 42.1 37.0 - 48.5 % Final     Platelets   Date Value Ref Range Status   06/27/2023 194 150 - 450 K/uL Final     Gran # (ANC)   Date Value Ref Range Status   06/27/2023 1.6 (L) 1.8 - 7.7 K/uL Final     Gran %   Date Value Ref Range Status   06/27/2023 43.3 38.0 - 73.0 % Final       Chemistry        Component Value Date/Time     06/27/2023 1130    K 4.6 06/27/2023 1130     06/27/2023 1130    CO2 28 06/27/2023 1130    BUN 9 06/27/2023 1130    CREATININE 0.7 06/27/2023 1130    GLU 86 06/27/2023 1130        Component Value Date/Time    CALCIUM 10.0 06/27/2023 1130    ALKPHOS 73 06/27/2023 1130    AST 18 06/27/2023 1130    ALT 16 06/27/2023 1130     BILITOT 0.4 06/27/2023 1130    ESTGFRAFRICA >60.0 09/30/2021 0820    EGFRNONAA >60.0 09/30/2021 0820              Assessment/Plan:     1. Thrombophlebitis    2. History of DVT (deep vein thrombosis)      She had started Xarelto due to persistent symptoms from superficial thrombophlebitis--those symptoms are now resolved and she has no other strong indications to remain on anticoagulation at this time. She was intolerant of Xarelto so that is being stopped for now. If symptoms of thrombophlebitis return likely we will just plan an alterate anticoagulant.    Med and Orders:       Follow Up:  Follow up in about 4 weeks (around 5/13/2024).      Above care plan was discussed with patient and all questions were addressed to their expressed satisfaction.       Lauri Shell MD, FACP  Hematology & Medical Oncology  [facility]

## 2024-06-13 ENCOUNTER — PATIENT OUTREACH (OUTPATIENT)
Dept: ADMINISTRATIVE | Facility: HOSPITAL | Age: 64
End: 2024-06-13
Payer: COMMERCIAL

## 2024-08-12 ENCOUNTER — TELEPHONE (OUTPATIENT)
Dept: OPHTHALMOLOGY | Facility: CLINIC | Age: 64
End: 2024-08-12
Payer: COMMERCIAL

## 2024-08-13 ENCOUNTER — TELEPHONE (OUTPATIENT)
Dept: OPHTHALMOLOGY | Facility: CLINIC | Age: 64
End: 2024-08-13
Payer: COMMERCIAL

## 2024-08-13 NOTE — TELEPHONE ENCOUNTER
----- Message from Cristina Alvares sent at 8/12/2024 11:21 AM CDT -----  Regarding: Dr. Keller appointment  Contact: [phone]    ----- Message -----  From: Karen Granger  Sent: 8/12/2024  10:02 AM CDT  To: Arianna PORRAS Staff    Type:  Sooner Apoointment Request  Caller is requesting a sooner appointment.  Name of Caller:Daphnie Sister  When is the first available appointment?Sooner then 10/25/2024 at any location  Symptoms:Patient sister states her sister has sting in her eye  Would the patient rather a call back or a response via [facility]? Call   Best Call Back Number:[phone]   Additional Information: Please call the number in the message [place]

## 2024-08-26 ENCOUNTER — HOSPITAL ENCOUNTER (EMERGENCY)
Facility: HOSPITAL | Age: 64
Discharge: HOME | End: 2024-08-26
Attending: EMERGENCY MEDICINE
Payer: COMMERCIAL

## 2024-08-26 VITALS
TEMPERATURE: 99 F | HEART RATE: 68 BPM | DIASTOLIC BLOOD PRESSURE: 74 MMHG | WEIGHT: 171 LBS | OXYGEN SATURATION: 98 % | RESPIRATION RATE: 20 BRPM | BODY MASS INDEX: 26.78 KG/M2 | SYSTOLIC BLOOD PRESSURE: 120 MMHG

## 2024-08-26 DIAGNOSIS — M79.672 LEFT FOOT PAIN: ICD-10-CM

## 2024-08-26 PROCEDURE — 99283 EMERGENCY DEPT VISIT LOW MDM: CPT | Mod: 25,ER

## 2024-08-26 PROCEDURE — 25000003 PHARM REV CODE 250: Mod: ER

## 2024-08-26 RX ORDER — HYDROCODONE BITARTRATE AND ACETAMINOPHEN 5; 325 MG/1; MG/1
1 TABLET ORAL
Status: COMPLETED | OUTPATIENT
Start: 2024-08-26 | End: 2024-08-26

## 2024-08-26 RX ORDER — HYDROCODONE BITARTRATE AND ACETAMINOPHEN 5; 325 MG/1; MG/1
1 TABLET ORAL EVERY 6 HOURS PRN
Qty: 12 TABLET | Refills: 0 | Status: SHIPPED | OUTPATIENT
Start: 2024-08-26

## 2024-08-26 RX ADMIN — HYDROCODONE BITARTRATE AND ACETAMINOPHEN 1 TABLET: 5; 325 TABLET ORAL at 12:08

## 2024-08-26 NOTE — DISCHARGE INSTRUCTIONS
You were seen in the emergency department today for left foot pain.  Please take all medications as prescribed and as we discussed.  Follow-up with specialist if instructed to do so.  It is important to remember that some problems are difficult to diagnose and may not be found during your Emergency Department visit. Be sure to follow up with your primary care doctor and review all labs/imaging/tests that were performed during this visit with them. Some labs/tests may be outside of the normal range and require non-emergent follow-up and further investigation to help diagnose/exclude/prevent complications or other medical conditions. Return to the emergency department for any new or worsening symptoms. Thank you for allowing me to care for you today, it was my pleasure. I hope you get to feeling better soon!

## 2024-08-26 NOTE — ED PROVIDER NOTES
Encounter Date: 8/26/2024    SCRIBE #1 NOTE: I, Milana Perez, am scribing for, and in the presence of,  Rico Ritter PA-C. I have scribed the following portions of the note - Other sections scribed: HPI, ROS, PE.       History     Chief Complaint   Patient presents with    Foot Injury     Left foot injury after she stepped off ladder wrong on Friday      Patient is a 63 y.o. female with a past medical history of DVT who presents to the Emergency Department for evaluation of right ankle pain  x 1 week.  Patient reports she twisted her right ankle after missing a step while going down a ladder. Denies head injury or LOC. Patient is still ambulatory but notes she has been walking on her toes. She has not taken any medications for the symptoms. Patient takes daily 81 mg Aspirin due to history of DTV. Denies any anticoagulant use such as Eliquis or Xarelto. Reports drug allergy to Ibuprofen. Reports history of heart valve regurgitation but is followed by cardiology. She denies fever, nausea, vomiting, or diarrhea.      The history is provided by the patient. No  was used.     Review of patient's allergies indicates:   Allergen Reactions    Ibuprofen Other (See Comments)     Leg and hand     Past Medical History:   Diagnosis Date    Allergy     Fibrocystic breast     History of cholecystectomy     Personal history of colonic polyps 02/23/2024     Past Surgical History:   Procedure Laterality Date    BIOPSY  10/28/2020    BREAST BIOPSY Right 10/28/2020    Benign fibrotic breast tissue with fibroadenomatoid change and duct ectasia    CHOLECYSTECTOMY      COLONOSCOPY N/A 2/23/2024    Procedure: COLONOSCOPY;  Surgeon: Michelle Baker MD;  [facility]: [facility];  Service: Endoscopy;  Laterality: N/A;  Referral: Vivi Stiles MD / Deo / Inst portal / LW  2/14 pt rescheduled, Suprep, portal -ml  2/16- pt states she is not starting her Xarelto until after her colonoscopy / pc complete. CAT     ESOPHAGOGASTRODUODENOSCOPY N/A 12/28/2023    Procedure: EGD (ESOPHAGOGASTRODUODENOSCOPY);  Surgeon: Tom Landeros MD;  Location: [facility];  Service: Endoscopy;  Laterality: N/A;    HERNIA REPAIR      VAGINAL DELIVERY       Family History   Problem Relation Name Age of Onset    Cancer Mother          kidney    Endometrial cancer Mother      Diabetes Father      Diabetes Sister      Stroke Brother      Clotting disorder Son      Diabetes Brother      No Known Problems Sister      No Known Problems Son      Endometrial cancer Maternal Grandmother      Amblyopia Neg Hx      Blindness Neg Hx      Cataracts Neg Hx      Glaucoma Neg Hx      Macular degeneration Neg Hx      Retinal detachment Neg Hx      Strabismus Neg Hx       Social History     Tobacco Use    Smoking status: Never    Smokeless tobacco: Never   Substance Use Topics    Alcohol use: No     Alcohol/week: 0.0 standard drinks of alcohol    Drug use: No     Review of Systems   Constitutional:  Negative for chills and fever.   Respiratory:  Negative for shortness of breath.    Cardiovascular:  Negative for chest pain, palpitations and leg swelling.   Gastrointestinal:  Negative for abdominal pain, diarrhea, nausea and vomiting.   Musculoskeletal:  Positive for arthralgias (R ankle). Negative for neck pain and neck stiffness.   Skin:  Positive for color change.   Neurological:  Negative for dizziness, syncope, light-headedness, numbness and headaches.       Physical Exam     Initial Vitals [08/26/24 1044]   BP Pulse Resp Temp SpO2   120/74 68 20 98.6 °F (37 °C) 98 %      MAP       --         Physical Exam    Nursing note and vitals reviewed.  Constitutional: She appears well-developed and well-nourished.   HENT:   Head: Normocephalic and atraumatic.   Right Ear: External ear normal.   Left Ear: External ear normal.   Neck: Carotid bruit is not present.   Normal range of motion.  Cardiovascular:  Normal rate, regular rhythm, normal heart sounds and intact  distal pulses.     Exam reveals no gallop and no friction rub.       No murmur heard.  Pulmonary/Chest: Breath sounds normal. No respiratory distress. She has no wheezes. She has no rhonchi. She has no rales.   Abdominal: Abdomen is soft. Bowel sounds are normal. She exhibits no distension. There is no abdominal tenderness. There is no rebound and no guarding.   Musculoskeletal:         General: Normal range of motion.      Cervical back: Normal range of motion.      Comments: Normal active range of motion of right ankle. No obvious deformities or swelling. Ecchymosis and mild tenderness to sole of right foot. Normal capillary refill. 2+ DP pulses. Patient is ambulatory.      Neurological: She is alert and oriented to person, place, and time. GCS score is 15. GCS eye subscore is 4. GCS verbal subscore is 5. GCS motor subscore is 6.   Psychiatric: She has a normal mood and affect.         ED Course   Procedures  Labs Reviewed - No data to display       Imaging Results              X-Ray Ankle Complete Right (Final result)  Result time 08/26/24 12:19:02   Procedure changed from X-Ray Ankle Complete Left     Final result by Elias Guzmán MD (08/26/24 12:19:02)                   Impression:      No acute displaced fracture-dislocation identified.      Electronically signed by: Elias Guzmán MD  Date:    08/26/2024  Time:    12:19               Narrative:    EXAMINATION:  XR FOOT COMPLETE 3 VIEW RIGHT; XR ANKLE COMPLETE 3 VIEW RIGHT    CLINICAL HISTORY:  pain;. Pain in left foot    TECHNIQUE:  AP, lateral, and oblique views of the right foot and right ankle were performed.    COMPARISON:  None    FINDINGS:  Overall alignment is within normal limits.  No displaced fracture, dislocation or destructive osseous process.  Ankle mortise appears intact.  Bronchitic elation is congruent.  Baseline minimal DJD at the ankle and foot.  Small enthesophyte at the Achilles insertion site.  No subcutaneous emphysema or radiopaque  foreign body.                                       X-Ray Foot Complete Right (Final result)  Result time 08/26/24 12:19:02   Procedure changed from X-Ray Foot Complete Left     Final result by Elias Guzmán MD (08/26/24 12:19:02)                   Impression:      No acute displaced fracture-dislocation identified.      Electronically signed by: Elias Guzmán MD  Date:    08/26/2024  Time:    12:19               Narrative:    EXAMINATION:  XR FOOT COMPLETE 3 VIEW RIGHT; XR ANKLE COMPLETE 3 VIEW RIGHT    CLINICAL HISTORY:  pain;. Pain in left foot    TECHNIQUE:  AP, lateral, and oblique views of the right foot and right ankle were performed.    COMPARISON:  None    FINDINGS:  Overall alignment is within normal limits.  No displaced fracture, dislocation or destructive osseous process.  Ankle mortise appears intact.  Bronchitic elation is congruent.  Baseline minimal DJD at the ankle and foot.  Small enthesophyte at the Achilles insertion site.  No subcutaneous emphysema or radiopaque foreign body.                                       Medications   HYDROcodone-acetaminophen 5-325 mg per tablet 1 tablet (1 tablet Oral Given 8/26/24 1234)     Medical Decision Making  This is an emergent evaluation of a 63 y.o. female with a past medical history of DVT who presents to the Emergency Department for evaluation of right ankle pain  x 1 week.  Patient reports she twisted her right ankle after missing a step while going down a ladder.    Patient looks well clinically. Normal active range of motion of right ankle. No obvious deformities or swelling. Ecchymosis and mild tenderness to sole of right foot. Normal capillary refill. 2+ DP pulses. Patient is ambulatory.  Regular rate rhythm without murmurs.  No carotid bruits appreciated on exam. Lungs are clear to auscultation bilaterally.  Abdomen is soft, nontender, non distended, with normal bowel sounds.     Differential diagnosis includes but is not limited to fracture,  dislocation, sprain.  Considered septic joint but highly doubtful given no joint erythema, swelling, mild range of motion, and no systemic symptoms.     Workup initiated with x-rays.  Ordered 1 Norco for pain.  Vital signs, chart, labs, and/or imaging were all reviewed.  See ED course below and interpretations above. My overall impression is sprain of foot. Will discharge home with short course of pain medicine, with orthopedic follow-up.  Ambulatory referral placed.  Patient placed in postop shoe for comfort measures. Patient is very well appearing, and in no acute distress. Vital signs are reassuring here in the emergency department, patient is afebrile, breathing comfortable, satting 98 % on room air. Patient/Caregiver is stable for discharge at this time.  Patient/Caregiver was informed of results and plan of care. Patient/Caregiver verbalized understanding of care plan. All questions and concerns were addressed. Discussed strict return precautions with the patient/caregiver. Instructed follow up with primary care provider within 1 week.      Rico Ritter PA-C    DISCLAIMER: This note was prepared with [employer] voice recognition transcription software. Garbled syntax, mangled pronouns, and other bizarre constructions may be attributed to that software system.       Amount and/or Complexity of Data Reviewed  Radiology: ordered. Decision-making details documented in ED Course.    Risk  Prescription drug management.            Scribe Attestation:   Scribe #1: I performed the above scribed service and the documentation accurately describes the services I performed. I attest to the accuracy of the note.        ED Course as of 08/26/24 1418   Mon Aug 26, 2024   1215 BP: 120/74 [TM]   1215 Temp: 98.6 °F (37 °C) [TM]   1215 Pulse: 68 [TM]   1215 Resp: 20 [TM]   1215 SpO2: 98 % [TM]   1224 No acute displaced fracture-dislocation identified. [TM]   1224 X-Ray Ankle Complete Right  No acute displaced fracture-dislocation  identified. [TM]   1224 Will place patient in postop shoe for comfort measures.  Will discharge home short course of pain medicine with orthopedic follow-up. [TM]      ED Course User Index  [TM] Rico Ritter PA-C                           IRico PA-C, personally performed the services described in this documentation. All medical record entries made by the scribe were at my direction and in my presence. I have reviewed the chart and agree that the record reflects my personal performance and is accurate and complete.      DISCLAIMER: This note was prepared with [employer] voice recognition transcription software. Garbled syntax, mangled pronouns, and other bizarre constructions may be attributed to that software system.    Clinical Impression:  Final diagnoses:  [M79.672] Left foot pain          ED Disposition Condition    Discharge Stable          ED Prescriptions       Medication Sig Dispense Start Date End Date Auth. Provider    HYDROcodone-acetaminophen (NORCO) 5-325 mg per tablet Take 1 tablet by mouth every 6 (six) hours as needed for Pain. 12 tablet 8/26/2024 -- Rico Ritter PA-C          Follow-up Information       Follow up With Specialties Details Why Contact Info    [facility] ED Emergency Medicine Go to  As needed, If symptoms worsen, or new symptoms develop [address] [facility] [address] [phone]    Primary care doctor  Schedule an appointment as soon as possible for a visit in 3 days               Rico Ritter PA-C  08/26/24 [id number]

## 2024-10-09 ENCOUNTER — TELEPHONE (OUTPATIENT)
Dept: TRANSPLANT | Facility: CLINIC | Age: 64
End: 2024-10-09

## 2024-10-09 DIAGNOSIS — I50.9 CONGESTIVE HEART FAILURE, UNSPECIFIED HF CHRONICITY, UNSPECIFIED HEART FAILURE TYPE: Primary | ICD-10-CM

## 2024-10-21 ENCOUNTER — TELEPHONE (OUTPATIENT)
Dept: FAMILY MEDICINE | Facility: CLINIC | Age: 64
End: 2024-10-21
Payer: MEDICARE

## 2024-10-21 DIAGNOSIS — Z12.31 BREAST CANCER SCREENING BY MAMMOGRAM: Primary | ICD-10-CM

## 2024-10-21 NOTE — TELEPHONE ENCOUNTER
----- Message from Brandy sent at 10/21/2024 12:29 PM CDT -----  Pt needs orders for a mammogram. Has a history of breast cancer and is due for a mammogram.    Call Back [phone]

## 2024-10-21 NOTE — TELEPHONE ENCOUNTER
----- Message from Areli sent at 10/21/2024 12:10 PM CDT -----  Type:  Mammogram    Caller is requesting to schedule their annual mammogram appointment.  Order is not listed in EPIC.  Please enter order and contact patient to schedule.  Name of Caller: self     Where would they like the mammogram performed? [facility]    Would the patient rather a call back or a response via [facility]? call    Best Call Back Number: .[phone]

## 2024-10-23 ENCOUNTER — HOSPITAL ENCOUNTER (OUTPATIENT)
Facility: HOSPITAL | Age: 64
Discharge: HOME | End: 2024-10-24
Attending: EMERGENCY MEDICINE | Admitting: STUDENT IN AN ORGANIZED HEALTH CARE EDUCATION/TRAINING PROGRAM
Payer: MEDICARE

## 2024-10-23 DIAGNOSIS — R07.9 CHEST PAIN: Primary | ICD-10-CM

## 2024-10-23 DIAGNOSIS — N39.0 ACUTE UTI: ICD-10-CM

## 2024-10-23 PROBLEM — M19.90 ARTHRITIS: Status: ACTIVE | Noted: 2019-11-17

## 2024-10-23 PROBLEM — R91.8 INDETERMINATE PULMONARY NODULES: Status: ACTIVE | Noted: 2024-10-23

## 2024-10-23 PROBLEM — R51.9 HEADACHE: Status: ACTIVE | Noted: 2019-11-17

## 2024-10-23 LAB
ALBUMIN SERPL-MCNC: 3.7 G/DL (ref 3.3–5.5)
ALP SERPL-CCNC: 66 U/L (ref 42–141)
BILIRUB SERPL-MCNC: 0.7 MG/DL (ref 0.2–1.6)
BILIRUBIN, POC UA: NEGATIVE
BLOOD, POC UA: ABNORMAL
BUN SERPL-MCNC: 11 MG/DL (ref 7–22)
CALCIUM SERPL-MCNC: 9.8 MG/DL (ref 8–10.3)
CHLORIDE SERPL-SCNC: 101 MMOL/L (ref 98–108)
CLARITY, UA: CLEAR
COLOR, UA: ABNORMAL
CREAT SERPL-MCNC: 0.6 MG/DL (ref 0.6–1.2)
GLUCOSE SERPL-MCNC: 120 MG/DL (ref 73–118)
GLUCOSE, POC UA: NEGATIVE
HCT, POC: NORMAL
HGB, POC: NORMAL (ref 14–18)
KETONES, POC UA: NEGATIVE
LEUKOCYTE EST, POC UA: ABNORMAL
MCH, POC: NORMAL
MCHC, POC: NORMAL
MCV, POC: NORMAL
MPV, POC: NORMAL
NITRITE, POC UA: NEGATIVE
OHS QRS DURATION: 86 MS
OHS QTC CALCULATION: 387 MS
PH UR STRIP: 7 [PH] (ref 5–8)
POC ALT (SGPT): 13 U/L (ref 10–47)
POC AST (SGOT): 25 U/L (ref 11–38)
POC B-TYPE NATRIURETIC PEPTIDE: 14.1 PG/ML (ref 0–100)
POC CARDIAC TROPONIN I: 0 NG/ML (ref 0–0.08)
POC PLATELET COUNT: NORMAL
POC PTINR: 0.9 (ref 0.9–1.2)
POC PTWBT: 9.3 SEC (ref 9.7–14.3)
POC TCO2: 30 MMOL/L (ref 18–33)
POTASSIUM BLD-SCNC: 4 MMOL/L (ref 3.6–5.1)
PROTEIN, POC UA: NEGATIVE
PROTEIN, POC: 7.3 G/DL (ref 6.4–8.1)
RBC, POC: NORMAL
RDW, POC: NORMAL
SAMPLE: ABNORMAL
SAMPLE: NORMAL
SODIUM BLD-SCNC: 143 MMOL/L (ref 128–145)
SPECIFIC GRAVITY, POC UA: 1.01 (ref 1–1.03)
TROPONIN I SERPL DL<=0.01 NG/ML-MCNC: <0.006 NG/ML (ref 0–0.03)
UROBILINOGEN, POC UA: 0.2 E.U./DL
WBC, POC: NORMAL

## 2024-10-23 PROCEDURE — G0378 HOSPITAL OBSERVATION PER HR: HCPCS

## 2024-10-23 PROCEDURE — 84484 ASSAY OF TROPONIN QUANT: CPT | Mod: ER

## 2024-10-23 PROCEDURE — 81003 URINALYSIS AUTO W/O SCOPE: CPT | Mod: ER

## 2024-10-23 PROCEDURE — 25500020 PHARM REV CODE 255: Mod: ER | Performed by: EMERGENCY MEDICINE

## 2024-10-23 PROCEDURE — 83880 ASSAY OF NATRIURETIC PEPTIDE: CPT | Mod: ER

## 2024-10-23 PROCEDURE — 93005 ELECTROCARDIOGRAM TRACING: CPT | Mod: ER

## 2024-10-23 PROCEDURE — G0378 HOSPITAL OBSERVATION PER HR: HCPCS | Mod: ER

## 2024-10-23 PROCEDURE — 36415 COLL VENOUS BLD VENIPUNCTURE: CPT | Performed by: STUDENT IN AN ORGANIZED HEALTH CARE EDUCATION/TRAINING PROGRAM

## 2024-10-23 PROCEDURE — 96372 THER/PROPH/DIAG INJ SC/IM: CPT | Performed by: STUDENT IN AN ORGANIZED HEALTH CARE EDUCATION/TRAINING PROGRAM

## 2024-10-23 PROCEDURE — 25000242 PHARM REV CODE 250 ALT 637 W/ HCPCS: Mod: ER | Performed by: EMERGENCY MEDICINE

## 2024-10-23 PROCEDURE — 93010 ELECTROCARDIOGRAM REPORT: CPT | Mod: ,,, | Performed by: INTERNAL MEDICINE

## 2024-10-23 PROCEDURE — 63600175 PHARM REV CODE 636 W HCPCS: Performed by: STUDENT IN AN ORGANIZED HEALTH CARE EDUCATION/TRAINING PROGRAM

## 2024-10-23 PROCEDURE — 99285 EMERGENCY DEPT VISIT HI MDM: CPT | Mod: 25,ER

## 2024-10-23 PROCEDURE — 85025 COMPLETE CBC W/AUTO DIFF WBC: CPT | Mod: ER

## 2024-10-23 PROCEDURE — 87086 URINE CULTURE/COLONY COUNT: CPT | Performed by: EMERGENCY MEDICINE

## 2024-10-23 PROCEDURE — 80053 COMPREHEN METABOLIC PANEL: CPT | Mod: ER

## 2024-10-23 PROCEDURE — 85610 PROTHROMBIN TIME: CPT | Mod: ER

## 2024-10-23 PROCEDURE — 25000003 PHARM REV CODE 250: Mod: ER | Performed by: EMERGENCY MEDICINE

## 2024-10-23 PROCEDURE — 25000242 PHARM REV CODE 250 ALT 637 W/ HCPCS: Mod: GY,ER | Performed by: EMERGENCY MEDICINE

## 2024-10-23 PROCEDURE — 84484 ASSAY OF TROPONIN QUANT: CPT | Performed by: STUDENT IN AN ORGANIZED HEALTH CARE EDUCATION/TRAINING PROGRAM

## 2024-10-23 RX ORDER — ENOXAPARIN SODIUM 100 MG/ML
40 INJECTION SUBCUTANEOUS EVERY 24 HOURS
Status: DISCONTINUED | OUTPATIENT
Start: 2024-10-23 | End: 2024-10-24 | Stop reason: HOSPADM

## 2024-10-23 RX ORDER — ALUMINUM HYDROXIDE, MAGNESIUM HYDROXIDE, AND SIMETHICONE 1200; 120; 1200 MG/30ML; MG/30ML; MG/30ML
30 SUSPENSION ORAL 4 TIMES DAILY PRN
Status: DISCONTINUED | OUTPATIENT
Start: 2024-10-23 | End: 2024-10-24 | Stop reason: HOSPADM

## 2024-10-23 RX ORDER — POLYETHYLENE GLYCOL 3350 17 G/17G
17 POWDER, FOR SOLUTION ORAL DAILY PRN
Status: DISCONTINUED | OUTPATIENT
Start: 2024-10-23 | End: 2024-10-24 | Stop reason: HOSPADM

## 2024-10-23 RX ORDER — TALC
6 POWDER (GRAM) TOPICAL NIGHTLY PRN
Status: DISCONTINUED | OUTPATIENT
Start: 2024-10-23 | End: 2024-10-24 | Stop reason: HOSPADM

## 2024-10-23 RX ORDER — ASPIRIN 325 MG
325 TABLET ORAL
Status: COMPLETED | OUTPATIENT
Start: 2024-10-23 | End: 2024-10-23

## 2024-10-23 RX ORDER — IBUPROFEN 200 MG
16 TABLET ORAL
Status: DISCONTINUED | OUTPATIENT
Start: 2024-10-23 | End: 2024-10-24 | Stop reason: HOSPADM

## 2024-10-23 RX ORDER — IBUPROFEN 200 MG
24 TABLET ORAL
Status: DISCONTINUED | OUTPATIENT
Start: 2024-10-23 | End: 2024-10-24 | Stop reason: HOSPADM

## 2024-10-23 RX ORDER — ACETAMINOPHEN 325 MG/1
650 TABLET ORAL
Status: COMPLETED | OUTPATIENT
Start: 2024-10-23 | End: 2024-10-23

## 2024-10-23 RX ORDER — SODIUM CHLORIDE 0.9 % (FLUSH) 0.9 %
3 SYRINGE (ML) INJECTION EVERY 12 HOURS PRN
Status: DISCONTINUED | OUTPATIENT
Start: 2024-10-23 | End: 2024-10-24 | Stop reason: HOSPADM

## 2024-10-23 RX ORDER — GLUCAGON 1 MG
1 KIT INJECTION
Status: DISCONTINUED | OUTPATIENT
Start: 2024-10-23 | End: 2024-10-24 | Stop reason: HOSPADM

## 2024-10-23 RX ORDER — ACETAMINOPHEN 325 MG/1
650 TABLET ORAL EVERY 4 HOURS PRN
Status: DISCONTINUED | OUTPATIENT
Start: 2024-10-23 | End: 2024-10-24 | Stop reason: HOSPADM

## 2024-10-23 RX ORDER — NALOXONE HCL 0.4 MG/ML
0.02 VIAL (ML) INJECTION
Status: DISCONTINUED | OUTPATIENT
Start: 2024-10-23 | End: 2024-10-24 | Stop reason: HOSPADM

## 2024-10-23 RX ORDER — NITROGLYCERIN 0.4 MG/1
0.4 TABLET SUBLINGUAL EVERY 5 MIN PRN
Status: DISCONTINUED | OUTPATIENT
Start: 2024-10-23 | End: 2024-10-24 | Stop reason: HOSPADM

## 2024-10-23 RX ORDER — SIMETHICONE 80 MG
1 TABLET,CHEWABLE ORAL 4 TIMES DAILY PRN
Status: DISCONTINUED | OUTPATIENT
Start: 2024-10-23 | End: 2024-10-24 | Stop reason: HOSPADM

## 2024-10-23 RX ORDER — NAPROXEN SODIUM 220 MG/1
81 TABLET, FILM COATED ORAL DAILY
Status: DISCONTINUED | OUTPATIENT
Start: 2024-10-24 | End: 2024-10-24 | Stop reason: HOSPADM

## 2024-10-23 RX ORDER — ONDANSETRON HYDROCHLORIDE 2 MG/ML
4 INJECTION, SOLUTION INTRAVENOUS EVERY 8 HOURS PRN
Status: DISCONTINUED | OUTPATIENT
Start: 2024-10-23 | End: 2024-10-24 | Stop reason: HOSPADM

## 2024-10-23 RX ORDER — ACETAMINOPHEN 325 MG/1
650 TABLET ORAL EVERY 8 HOURS PRN
Status: DISCONTINUED | OUTPATIENT
Start: 2024-10-23 | End: 2024-10-24 | Stop reason: HOSPADM

## 2024-10-23 RX ORDER — PROCHLORPERAZINE EDISYLATE 5 MG/ML
5 INJECTION INTRAMUSCULAR; INTRAVENOUS EVERY 6 HOURS PRN
Status: DISCONTINUED | OUTPATIENT
Start: 2024-10-23 | End: 2024-10-24 | Stop reason: HOSPADM

## 2024-10-23 RX ADMIN — ACETAMINOPHEN 650 MG: 325 TABLET ORAL at 12:10

## 2024-10-23 RX ADMIN — IOHEXOL 100 ML: 350 INJECTION, SOLUTION INTRAVENOUS at 01:10

## 2024-10-23 RX ADMIN — NITROGLYCERIN 0.4 MG: 0.4 TABLET, ORALLY DISINTEGRATING SUBLINGUAL at 12:10

## 2024-10-23 RX ADMIN — ENOXAPARIN SODIUM 40 MG: 40 INJECTION SUBCUTANEOUS at 08:10

## 2024-10-23 RX ADMIN — ASPIRIN 325 MG ORAL TABLET 325 MG: 325 PILL ORAL at 12:10

## 2024-10-24 VITALS
RESPIRATION RATE: 19 BRPM | WEIGHT: 172.63 LBS | BODY MASS INDEX: 27.09 KG/M2 | HEART RATE: 71 BPM | TEMPERATURE: 99 F | HEIGHT: 67 IN | OXYGEN SATURATION: 99 % | DIASTOLIC BLOOD PRESSURE: 69 MMHG | SYSTOLIC BLOOD PRESSURE: 127 MMHG

## 2024-10-24 PROBLEM — R07.89 CHEST PAIN, ATYPICAL: Status: ACTIVE | Noted: 2024-10-23

## 2024-10-24 LAB
ANION GAP SERPL CALC-SCNC: 9 MMOL/L (ref 8–16)
ASCENDING AORTA: 3.37 CM
AV INDEX (PROSTH): 0.85
AV MEAN GRADIENT: 3.3 MMHG
AV PEAK GRADIENT: 6.8 MMHG
AV REGURGITATION PRESSURE HALF TIME: 835.56 MS
AV VALVE AREA BY VELOCITY RATIO: 2.4 CM²
AV VALVE AREA: 2.7 CM²
AV VELOCITY RATIO: 0.77
BASOPHILS # BLD AUTO: 0.03 K/UL (ref 0–0.2)
BASOPHILS NFR BLD: 0.7 % (ref 0–1.9)
BSA FOR ECHO PROCEDURE: 1.91 M2
BUN SERPL-MCNC: 9 MG/DL (ref 8–23)
CALCIUM SERPL-MCNC: 9.6 MG/DL (ref 8.7–10.5)
CHLORIDE SERPL-SCNC: 105 MMOL/L (ref 95–110)
CHOLEST SERPL-MCNC: 216 MG/DL (ref 120–199)
CHOLEST/HDLC SERPL: 2.7 {RATIO} (ref 2–5)
CO2 SERPL-SCNC: 26 MMOL/L (ref 23–29)
CREAT SERPL-MCNC: 0.8 MG/DL (ref 0.5–1.4)
CV ECHO LV RWT: 0.27 CM
CV STRESS BASE HR: 71 BPM
DIASTOLIC BLOOD PRESSURE: 74 MMHG
DIFFERENTIAL METHOD BLD: ABNORMAL
DOP CALC AO PEAK VEL: 1.3 M/S
DOP CALC AO VTI: 24.6 CM
DOP CALC LVOT AREA: 3.1 CM2
DOP CALC LVOT DIAMETER: 2 CM
DOP CALC LVOT PEAK VEL: 1 M/S
DOP CALC LVOT STROKE VOLUME: 65.6 CM3
DOP CALC MV VTI: 26 CM
DOP CALCLVOT PEAK VEL VTI: 20.9 CM
E WAVE DECELERATION TIME: 345.16 MSEC
E/A RATIO: 0.73
E/E' RATIO: 7.18 M/S
ECHO LV POSTERIOR WALL: 0.7 CM (ref 0.6–1.1)
EOSINOPHIL # BLD AUTO: 0.2 K/UL (ref 0–0.5)
EOSINOPHIL NFR BLD: 4.3 % (ref 0–8)
ERYTHROCYTE [DISTWIDTH] IN BLOOD BY AUTOMATED COUNT: 11.9 % (ref 11.5–14.5)
EST. GFR  (NO RACE VARIABLE): >60 ML/MIN/1.73 M^2
ESTIMATED AVG GLUCOSE: 111 MG/DL (ref 68–131)
FRACTIONAL SHORTENING: 34.6 % (ref 28–44)
GLUCOSE SERPL-MCNC: 100 MG/DL (ref 70–110)
HBA1C MFR BLD: 5.5 % (ref 4–5.6)
HCT VFR BLD AUTO: 41 % (ref 37–48.5)
HDLC SERPL-MCNC: 79 MG/DL (ref 40–75)
HDLC SERPL: 36.6 % (ref 20–50)
HGB BLD-MCNC: 13.4 G/DL (ref 12–16)
IMM GRANULOCYTES # BLD AUTO: 0 K/UL (ref 0–0.04)
IMM GRANULOCYTES NFR BLD AUTO: 0 % (ref 0–0.5)
INTERVENTRICULAR SEPTUM: 0.8 CM (ref 0.6–1.1)
IVC DIAMETER: 1.32 CM
LA MAJOR: 4.1 CM
LA MINOR: 3.71 CM
LA WIDTH: 3.7 CM
LDLC SERPL CALC-MCNC: 118.2 MG/DL (ref 63–159)
LEFT ATRIUM SIZE: 3.02 CM
LEFT ATRIUM VOLUME INDEX: 19.5 ML/M2
LEFT ATRIUM VOLUME: 37 CM3
LEFT INTERNAL DIMENSION IN SYSTOLE: 3.4 CM (ref 2.1–4)
LEFT VENTRICLE DIASTOLIC VOLUME INDEX: 68.53 ML/M2
LEFT VENTRICLE DIASTOLIC VOLUME: 130.2 ML
LEFT VENTRICLE MASS INDEX: 70.4 G/M2
LEFT VENTRICLE SYSTOLIC VOLUME INDEX: 25.2 ML/M2
LEFT VENTRICLE SYSTOLIC VOLUME: 47.85 ML
LEFT VENTRICULAR INTERNAL DIMENSION IN DIASTOLE: 5.2 CM (ref 3.5–6)
LEFT VENTRICULAR MASS: 133.8 G
LV LATERAL E/E' RATIO: 5.55 M/S
LV SEPTAL E/E' RATIO: 10.17 M/S
LVED V (TEICH): 130.2 ML
LVES V (TEICH): 47.85 ML
LVOT MG: 2.29 MMHG
LVOT MV: 0.72 CM/S
LYMPHOCYTES # BLD AUTO: 2 K/UL (ref 1–4.8)
LYMPHOCYTES NFR BLD: 43.8 % (ref 18–48)
MAGNESIUM SERPL-MCNC: 2 MG/DL (ref 1.6–2.6)
MCH RBC QN AUTO: 26.9 PG (ref 27–31)
MCHC RBC AUTO-ENTMCNC: 32.7 G/DL (ref 32–36)
MCV RBC AUTO: 82 FL (ref 82–98)
MONOCYTES # BLD AUTO: 0.4 K/UL (ref 0.3–1)
MONOCYTES NFR BLD: 8.1 % (ref 4–15)
MV MEAN GRADIENT: 1 MMHG
MV PEAK A VEL: 0.83 M/S
MV PEAK E VEL: 0.61 M/S
MV PEAK GRADIENT: 4 MMHG
MV STENOSIS PRESSURE HALF TIME: 100.1 MS
MV VALVE AREA BY CONTINUITY EQUATION: 2.52 CM2
MV VALVE AREA P 1/2 METHOD: 2.2 CM2
NEUTROPHILS # BLD AUTO: 1.9 K/UL (ref 1.8–7.7)
NEUTROPHILS NFR BLD: 43.1 % (ref 38–73)
NONHDLC SERPL-MCNC: 137 MG/DL
NRBC BLD-RTO: 0 /100 WBC
OHS CV CPX 85 PERCENT MAX PREDICTED HEART RATE MALE: 133
OHS CV CPX MAX PREDICTED HEART RATE: 156
OHS CV CPX PATIENT IS FEMALE: 1
OHS CV CPX PATIENT IS MALE: 0
OHS CV CPX PEAK DIASTOLIC BLOOD PRESSURE: 68 MMHG
OHS CV CPX PEAK HEAR RATE: 132 BPM
OHS CV CPX PEAK RATE PRESSURE PRODUCT: NORMAL
OHS CV CPX PEAK SYSTOLIC BLOOD PRESSURE: 121 MMHG
OHS CV CPX PERCENT MAX PREDICTED HEART RATE ACHIEVED: 88
OHS CV CPX RATE PRESSURE PRODUCT PRESENTING: 8094
OHS CV RV/LV RATIO: 0.6 CM
PISA AR MAX VEL: 3.39 M/S
PISA TR MAX VEL: 1.99 M/S
PLATELET # BLD AUTO: 191 K/UL (ref 150–450)
PMV BLD AUTO: 9.8 FL (ref 9.2–12.9)
POTASSIUM SERPL-SCNC: 4.8 MMOL/L (ref 3.5–5.1)
PULM VEIN S/D RATIO: 1.44
PV PEAK D VEL: 0.5 M/S
PV PEAK GRADIENT: 2 MMHG
PV PEAK S VEL: 0.72 M/S
PV PEAK VELOCITY: 0.79 M/S
RA MAJOR: 3.77 CM
RA PRESSURE ESTIMATED: 8 MMHG
RA WIDTH: 3.32 CM
RBC # BLD AUTO: 4.98 M/UL (ref 4–5.4)
RIGHT VENTRICLE DIASTOLIC BASEL DIMENSION: 3.1 CM
RIGHT VENTRICULAR END-DIASTOLIC DIMENSION: 3.11 CM
RV TB RVSP: 10 MMHG
SINUS: 3.07 CM
SODIUM SERPL-SCNC: 140 MMOL/L (ref 136–145)
STJ: 2.89 CM
SYSTOLIC BLOOD PRESSURE: 114 MMHG
TDI LATERAL: 0.11 M/S
TDI SEPTAL: 0.06 M/S
TDI: 0.09 M/S
TR MAX PG: 16 MMHG
TRICUSPID ANNULAR PLANE SYSTOLIC EXCURSION: 2.31 CM
TRIGL SERPL-MCNC: 94 MG/DL (ref 30–150)
TROPONIN I SERPL DL<=0.01 NG/ML-MCNC: <0.006 NG/ML (ref 0–0.03)
TSH SERPL DL<=0.005 MIU/L-ACNC: 1.75 UIU/ML (ref 0.4–4)
TV REST PULMONARY ARTERY PRESSURE: 24 MMHG
WBC # BLD AUTO: 4.47 K/UL (ref 3.9–12.7)
Z-SCORE OF LEFT VENTRICULAR DIMENSION IN END DIASTOLE: -0.25
Z-SCORE OF LEFT VENTRICULAR DIMENSION IN END SYSTOLE: 0.27

## 2024-10-24 PROCEDURE — 36415 COLL VENOUS BLD VENIPUNCTURE: CPT | Performed by: STUDENT IN AN ORGANIZED HEALTH CARE EDUCATION/TRAINING PROGRAM

## 2024-10-24 PROCEDURE — 63600175 PHARM REV CODE 636 W HCPCS: Performed by: INTERNAL MEDICINE

## 2024-10-24 PROCEDURE — 84484 ASSAY OF TROPONIN QUANT: CPT | Performed by: STUDENT IN AN ORGANIZED HEALTH CARE EDUCATION/TRAINING PROGRAM

## 2024-10-24 PROCEDURE — 80061 LIPID PANEL: CPT | Performed by: STUDENT IN AN ORGANIZED HEALTH CARE EDUCATION/TRAINING PROGRAM

## 2024-10-24 PROCEDURE — 80048 BASIC METABOLIC PNL TOTAL CA: CPT | Performed by: STUDENT IN AN ORGANIZED HEALTH CARE EDUCATION/TRAINING PROGRAM

## 2024-10-24 PROCEDURE — 25000003 PHARM REV CODE 250: Performed by: STUDENT IN AN ORGANIZED HEALTH CARE EDUCATION/TRAINING PROGRAM

## 2024-10-24 PROCEDURE — 84443 ASSAY THYROID STIM HORMONE: CPT | Performed by: STUDENT IN AN ORGANIZED HEALTH CARE EDUCATION/TRAINING PROGRAM

## 2024-10-24 PROCEDURE — 83036 HEMOGLOBIN GLYCOSYLATED A1C: CPT | Performed by: STUDENT IN AN ORGANIZED HEALTH CARE EDUCATION/TRAINING PROGRAM

## 2024-10-24 PROCEDURE — 96374 THER/PROPH/DIAG INJ IV PUSH: CPT

## 2024-10-24 PROCEDURE — G0378 HOSPITAL OBSERVATION PER HR: HCPCS

## 2024-10-24 PROCEDURE — 85025 COMPLETE CBC W/AUTO DIFF WBC: CPT | Performed by: STUDENT IN AN ORGANIZED HEALTH CARE EDUCATION/TRAINING PROGRAM

## 2024-10-24 PROCEDURE — 83735 ASSAY OF MAGNESIUM: CPT | Performed by: STUDENT IN AN ORGANIZED HEALTH CARE EDUCATION/TRAINING PROGRAM

## 2024-10-24 RX ORDER — DOBUTAMINE HYDROCHLORIDE 400 MG/100ML
10-20 INJECTION INTRAVENOUS CONTINUOUS
Status: DISCONTINUED | OUTPATIENT
Start: 2024-10-24 | End: 2024-10-24 | Stop reason: HOSPADM

## 2024-10-24 RX ORDER — ATORVASTATIN CALCIUM 20 MG/1
20 TABLET, FILM COATED ORAL NIGHTLY
Qty: 30 TABLET | Refills: 3 | Status: SHIPPED | OUTPATIENT
Start: 2024-10-24 | End: 2025-10-24

## 2024-10-24 RX ADMIN — DOBUTAMINE HYDROCHLORIDE 10 MCG/KG/MIN: 400 INJECTION INTRAVENOUS at 01:10

## 2024-10-24 RX ADMIN — ASPIRIN 81 MG CHEWABLE TABLET 81 MG: 81 TABLET CHEWABLE at 08:10

## 2024-10-25 ENCOUNTER — OFFICE VISIT (OUTPATIENT)
Dept: OPHTHALMOLOGY | Facility: CLINIC | Age: 64
End: 2024-10-25
Payer: MEDICARE

## 2024-10-25 DIAGNOSIS — H52.7 REFRACTIVE ERROR: ICD-10-CM

## 2024-10-25 DIAGNOSIS — H04.123 DRY EYE SYNDROME OF BOTH EYES: ICD-10-CM

## 2024-10-25 DIAGNOSIS — H25.13 NUCLEAR SCLEROSIS OF BOTH EYES: Primary | ICD-10-CM

## 2024-10-25 DIAGNOSIS — H43.813 VITREOUS DETACHMENT OF BOTH EYES: ICD-10-CM

## 2024-10-25 LAB — BACTERIA UR CULT: NORMAL

## 2024-10-25 PROCEDURE — 99999 PR PBB SHADOW E&M-EST. PATIENT-LVL II: CPT | Mod: PBBFAC,,, | Performed by: OPHTHALMOLOGY

## 2024-10-25 NOTE — PROGRESS NOTES
Subjective:       Patient ID: Cloie Bodden Reyes is a 63 y.o. female.    Chief Complaint: Concerns About Ocular Health    HPI    Dls: 3/19/23 Dr. Dow     62 y/o female presents today with c/o blurry vision at distance and near   ou  Pt wears trifocal glasses.   Pt c/o fbs od off/on.     + ou tearing  + ou itching  No burning  No pain  No ha's  + od>os off/on floaters  No flashes    Eye meds  Otc gtts ou prn     Pohx:   None    Fohx:   None          Last edited by Joan Fine MA on 10/25/2024  1:37 PM.             Assessment:       1. Nuclear sclerosis of both eyes    2. Dry eye syndrome of both eyes    3. Vitreous detachment of both eyes    4. Refractive error        Plan:       Cataracts- Not visually significant.  MARISOL OU-Needs AT's.  PVD's OU-Stable.  RE-Pt wants MRx.      AT's.  Give MRx.  RTC 1 yr.

## 2024-10-28 ENCOUNTER — PATIENT OUTREACH (OUTPATIENT)
Dept: ADMINISTRATIVE | Facility: CLINIC | Age: 64
End: 2024-10-28
Payer: MEDICARE

## 2024-10-30 DIAGNOSIS — Z78.0 MENOPAUSE: ICD-10-CM

## 2024-10-31 ENCOUNTER — HOSPITAL ENCOUNTER (OUTPATIENT)
Dept: RADIOLOGY | Facility: HOSPITAL | Age: 64
Discharge: HOME | End: 2024-10-31
Attending: FAMILY MEDICINE
Payer: MEDICARE

## 2024-10-31 DIAGNOSIS — Z12.31 BREAST CANCER SCREENING BY MAMMOGRAM: ICD-10-CM

## 2024-10-31 PROCEDURE — 77067 SCR MAMMO BI INCL CAD: CPT | Mod: TC

## 2024-11-07 ENCOUNTER — TELEPHONE (OUTPATIENT)
Dept: ORTHOPEDICS | Facility: CLINIC | Age: 64
End: 2024-11-07
Payer: MEDICARE

## 2024-11-07 ENCOUNTER — OFFICE VISIT (OUTPATIENT)
Dept: FAMILY MEDICINE | Facility: CLINIC | Age: 64
End: 2024-11-07
Payer: MEDICARE

## 2024-11-07 VITALS
DIASTOLIC BLOOD PRESSURE: 68 MMHG | WEIGHT: 175.06 LBS | HEART RATE: 76 BPM | HEIGHT: 67 IN | SYSTOLIC BLOOD PRESSURE: 120 MMHG | TEMPERATURE: 98 F | OXYGEN SATURATION: 97 % | BODY MASS INDEX: 27.48 KG/M2

## 2024-11-07 DIAGNOSIS — I10 ESSENTIAL HYPERTENSION: ICD-10-CM

## 2024-11-07 DIAGNOSIS — R92.8 ABNORMAL MAMMOGRAM: Primary | ICD-10-CM

## 2024-11-07 DIAGNOSIS — E78.5 HYPERLIPIDEMIA, UNSPECIFIED HYPERLIPIDEMIA TYPE: ICD-10-CM

## 2024-11-07 PROBLEM — I70.0 AORTIC ATHEROSCLEROSIS: Status: ACTIVE | Noted: 2024-11-07

## 2024-11-07 PROCEDURE — 3008F BODY MASS INDEX DOCD: CPT | Mod: CPTII,S$GLB,, | Performed by: FAMILY MEDICINE

## 2024-11-07 PROCEDURE — 99999 PR PBB SHADOW E&M-EST. PATIENT-LVL IV: CPT | Mod: PBBFAC,,, | Performed by: FAMILY MEDICINE

## 2024-11-07 PROCEDURE — 1157F ADVNC CARE PLAN IN RCRD: CPT | Mod: CPTII,S$GLB,, | Performed by: FAMILY MEDICINE

## 2024-11-07 PROCEDURE — 3078F DIAST BP <80 MM HG: CPT | Mod: CPTII,S$GLB,, | Performed by: FAMILY MEDICINE

## 2024-11-07 PROCEDURE — 1159F MED LIST DOCD IN RCRD: CPT | Mod: CPTII,S$GLB,, | Performed by: FAMILY MEDICINE

## 2024-11-07 PROCEDURE — 1160F RVW MEDS BY RX/DR IN RCRD: CPT | Mod: CPTII,S$GLB,, | Performed by: FAMILY MEDICINE

## 2024-11-07 PROCEDURE — 3288F FALL RISK ASSESSMENT DOCD: CPT | Mod: CPTII,S$GLB,, | Performed by: FAMILY MEDICINE

## 2024-11-07 PROCEDURE — 1101F PT FALLS ASSESS-DOCD LE1/YR: CPT | Mod: CPTII,S$GLB,, | Performed by: FAMILY MEDICINE

## 2024-11-07 PROCEDURE — 3074F SYST BP LT 130 MM HG: CPT | Mod: CPTII,S$GLB,, | Performed by: FAMILY MEDICINE

## 2024-11-07 PROCEDURE — 99214 OFFICE O/P EST MOD 30 MIN: CPT | Mod: S$GLB,,, | Performed by: FAMILY MEDICINE

## 2024-11-07 PROCEDURE — 3044F HG A1C LEVEL LT 7.0%: CPT | Mod: CPTII,S$GLB,, | Performed by: FAMILY MEDICINE

## 2024-11-07 RX ORDER — ATORVASTATIN CALCIUM 20 MG/1
20 TABLET, FILM COATED ORAL NIGHTLY
Qty: 90 TABLET | Refills: 3 | Status: SHIPPED | OUTPATIENT
Start: 2024-11-07 | End: 2025-11-07

## 2024-11-07 NOTE — TELEPHONE ENCOUNTER
Spoke to patient. Asked for clarification on what she needed evaluated during her appointment. Patient asked that we cancel that appointment. She is no longer having pain.     Kailyn Juarez MS, ATC, OTC  Clinical/Surgical Assistant - Dr. Susan Seaman  Orthopedics  Phone: [phone]

## 2024-11-07 NOTE — PROGRESS NOTES
Routine Office Visit     Patient Name: Cloie Bodden Reyes    : 1960  MRN: [id number]    Subjective     History of Present Illness    CHIEF COMPLAINT:  Patient presents today for follow-up after a recent ER visit for chest pain.    CHEST PAIN:  She reports a recent emergency room visit due to chest pain, with a negative workup. She describes persistent, brief sticking pain on the right side of her chest, located underneath and radiating in a specific pattern. This pain has been occurring since her hospital visit.    BREAST CONCERN:  She reports an abnormal mammogram with an abnormality noted in the left outer position of the left breast. She denies pain in the area of the abnormality but describes a transient sticking sensation under the breast. A diagnostic mammogram and left breast ultrasound have been ordered for further evaluation.    FOOT PAIN:  She reports improvement in foot pain and can now put her leg down a little, whereas previously she was unable to do so. She has scheduled an appointment with Dr. Seaman in a few weeks to address the foot issue. She acknowledges that therapy will be necessary for further recovery.    MEDICATIONS:  She reports taking Amlodipine 10 mg daily and Atorvastatin without any issues. She denies needing any medication refills at this time.    RECENT ILLNESS:  She reports experiencing a prolonged period of illness during her time in [place], including various pains and multiple falls, with an incident involving her Achilles tendon. She also contracted a severe case of influenza, which she describes as particularly intense. She attributes these illnesses to her advanced age.      ROS:  General: no fever, no chills, no fatigue, no weight gain, no weight loss  Eyes: no vision changes, no redness, no discharge  ENT: no ear pain, no nasal congestion, no sore throat  Cardiovascular: +chest pain, no palpitations, no lower extremity edema  Respiratory: no cough, no shortness of  "breath  Gastrointestinal: no abdominal pain, no nausea, no vomiting, no diarrhea, no constipation, no blood in stool  Genitourinary: no dysuria, no hematuria, no frequency  Musculoskeletal: no joint pain, +muscle pain  Skin: no rash, no lesion  Neurological: no headache, no dizziness, no numbness, no tingling  Psychiatric: no anxiety, no depression, no sleep difficulty           Objective     /68   Pulse 76   Temp 97.5 °F (36.4 °C) (Oral)   Ht 5' 7" (1.702 m)   Wt 79.4 kg (175 lb 0.7 oz)   LMP 05/11/2017 (Approximate)   SpO2 97%   BMI 27.42 kg/m²   Physical Exam  Constitutional:       Appearance: She is well-developed.   HENT:      Head: Normocephalic and atraumatic.   Eyes:      Conjunctiva/sclera: Conjunctivae normal.      Pupils: Pupils are equal, round, and reactive to light.   Cardiovascular:      Rate and Rhythm: Normal rate and regular rhythm.      Heart sounds: Normal heart sounds. No murmur heard.     No friction rub. No gallop.   Pulmonary:      Effort: No respiratory distress.      Breath sounds: Normal breath sounds.   Abdominal:      General: Bowel sounds are normal. There is no distension.      Palpations: Abdomen is soft.      Tenderness: There is no abdominal tenderness.   Musculoskeletal:         General: Normal range of motion.      Cervical back: Normal range of motion and neck supple.   Lymphadenopathy:      Cervical: No cervical adenopathy.   Skin:     General: Skin is warm.   Neurological:      Mental Status: She is alert and oriented to person, place, and time.           Assessment     Assessment & Plan     Reviewed recent ER visit for chest pain, noting negative workup   Identified abnormal mammogram on left side, outer position, requiring further investigation   Evaluated need for bone density scan due to patient's age and risk factors   Assessed vaccination status, noting several due but patient declined    OSTEOPOROSIS RISK ASSESSMENT:   Explained importance of bone density " scan for osteoporosis risk assessment, particularly in minority women.   Ordered bone density scan.   Patient to take OTC calcium and vitamin D supplements.    INFLUENZA:   Discussed benefits of flu vaccination, especially given patient's recent flu experience.    FOLLOW UP:   Follow up for next annual appointment.         Problem List Items Addressed This Visit          Cardiac/Vascular    Essential hypertension   Continued amlodipine 10 mg daily.      Hyperlipidemia    Relevant Medications    atorvastatin (LIPITOR) 20 MG tablet   Refilled atorvastatin with 90-day supply and 3 refills.       Other Visit Diagnoses       Abnormal mammogram    -  Primary    Relevant Orders    US Breast Left Limited    Mammo Digital Diagnostic Left with Jonnie   Ordered diagnostic mammogram of left breast.   Ordered left breast ultrasound.                This note was generated with the assistance of ambient listening technology. Verbal consent was obtained by the patient and accompanying visitor(s) for the recording of patient appointment to facilitate this note. I attest to having reviewed and edited the generated note for accuracy, though some syntax or spelling errors may persist. Please contact the author of this note for any clarification.

## 2024-11-11 ENCOUNTER — TELEPHONE (OUTPATIENT)
Dept: ORTHOPEDICS | Facility: CLINIC | Age: 64
End: 2024-11-11
Payer: MEDICARE

## 2024-11-11 PROBLEM — I10 ESSENTIAL HYPERTENSION: Status: ACTIVE | Noted: 2024-11-11

## 2024-11-11 PROBLEM — E78.5 HYPERLIPIDEMIA: Status: ACTIVE | Noted: 2024-11-11

## 2024-11-11 NOTE — TELEPHONE ENCOUNTER
Spoke with patient. Scheduled for 12/9 for L achilles pain. Patient also wants to be seen for R foot pain. Dr. Seaman's next available appointment is 12/9. Reached out to Dr. Garg's staff asking if they had sooner availability. Patient verbalized understanding.     Kailyn Juarez MS, ATC, OTC  Clinical/Surgical Assistant - Dr. Susan Seaman  Orthopedics  Phone: [phone]      ----- Message from [employer] sent at 11/11/2024 10:08 AM CST -----  Type:  Sooner Apoointment Request    Caller is requesting a sooner appointment.   Name of Caller:pt   When is the first available appointment?12/12  Symptoms:achilles tendon pain in heel of foot  Would the patient rather a call back or a response via [facility]? Call   Best Call Back Number: [phone]  Additional Information:

## 2024-11-15 ENCOUNTER — OFFICE VISIT (OUTPATIENT)
Dept: TRANSPLANT | Facility: CLINIC | Age: 64
End: 2024-11-15
Payer: MEDICARE

## 2024-11-15 ENCOUNTER — LAB VISIT (OUTPATIENT)
Dept: LAB | Facility: HOSPITAL | Age: 64
End: 2024-11-15
Attending: INTERNAL MEDICINE
Payer: MEDICARE

## 2024-11-15 VITALS
HEART RATE: 75 BPM | WEIGHT: 179.44 LBS | DIASTOLIC BLOOD PRESSURE: 60 MMHG | BODY MASS INDEX: 28.16 KG/M2 | HEIGHT: 67 IN | SYSTOLIC BLOOD PRESSURE: 119 MMHG

## 2024-11-15 DIAGNOSIS — R00.2 PALPITATIONS: ICD-10-CM

## 2024-11-15 DIAGNOSIS — I10 ESSENTIAL HYPERTENSION: ICD-10-CM

## 2024-11-15 DIAGNOSIS — D68.51 HOMOZYGOUS FACTOR V LEIDEN MUTATION: ICD-10-CM

## 2024-11-15 DIAGNOSIS — R07.9 CHEST PAIN, UNSPECIFIED TYPE: ICD-10-CM

## 2024-11-15 DIAGNOSIS — I50.9 CONGESTIVE HEART FAILURE, UNSPECIFIED HF CHRONICITY, UNSPECIFIED HEART FAILURE TYPE: ICD-10-CM

## 2024-11-15 DIAGNOSIS — I51.89 DIASTOLIC DYSFUNCTION: Primary | ICD-10-CM

## 2024-11-15 LAB
ALBUMIN SERPL BCP-MCNC: 4 G/DL (ref 3.5–5.2)
ALP SERPL-CCNC: 83 U/L (ref 40–150)
ALT SERPL W/O P-5'-P-CCNC: 16 U/L (ref 10–44)
ANION GAP SERPL CALC-SCNC: 10 MMOL/L (ref 8–16)
AST SERPL-CCNC: 21 U/L (ref 10–40)
BASOPHILS # BLD AUTO: 0.05 K/UL (ref 0–0.2)
BASOPHILS NFR BLD: 1.2 % (ref 0–1.9)
BILIRUB SERPL-MCNC: 0.5 MG/DL (ref 0.1–1)
BNP SERPL-MCNC: 14 PG/ML (ref 0–99)
BUN SERPL-MCNC: 14 MG/DL (ref 8–23)
CALCIUM SERPL-MCNC: 9.8 MG/DL (ref 8.7–10.5)
CHLORIDE SERPL-SCNC: 102 MMOL/L (ref 95–110)
CO2 SERPL-SCNC: 26 MMOL/L (ref 23–29)
CREAT SERPL-MCNC: 0.7 MG/DL (ref 0.5–1.4)
DIFFERENTIAL METHOD BLD: NORMAL
EOSINOPHIL # BLD AUTO: 0.1 K/UL (ref 0–0.5)
EOSINOPHIL NFR BLD: 1.9 % (ref 0–8)
ERYTHROCYTE [DISTWIDTH] IN BLOOD BY AUTOMATED COUNT: 12.1 % (ref 11.5–14.5)
EST. GFR  (NO RACE VARIABLE): >60 ML/MIN/1.73 M^2
GLUCOSE SERPL-MCNC: 86 MG/DL (ref 70–110)
HCT VFR BLD AUTO: 41 % (ref 37–48.5)
HGB BLD-MCNC: 13.7 G/DL (ref 12–16)
IMM GRANULOCYTES # BLD AUTO: 0.01 K/UL (ref 0–0.04)
IMM GRANULOCYTES NFR BLD AUTO: 0.2 % (ref 0–0.5)
LYMPHOCYTES # BLD AUTO: 1.5 K/UL (ref 1–4.8)
LYMPHOCYTES NFR BLD: 36.3 % (ref 18–48)
MAGNESIUM SERPL-MCNC: 2.2 MG/DL (ref 1.6–2.6)
MCH RBC QN AUTO: 27.5 PG (ref 27–31)
MCHC RBC AUTO-ENTMCNC: 33.4 G/DL (ref 32–36)
MCV RBC AUTO: 82 FL (ref 82–98)
MONOCYTES # BLD AUTO: 0.3 K/UL (ref 0.3–1)
MONOCYTES NFR BLD: 8 % (ref 4–15)
NEUTROPHILS # BLD AUTO: 2.2 K/UL (ref 1.8–7.7)
NEUTROPHILS NFR BLD: 52.4 % (ref 38–73)
NRBC BLD-RTO: 0 /100 WBC
PLATELET # BLD AUTO: 197 K/UL (ref 150–450)
PMV BLD AUTO: 9.3 FL (ref 9.2–12.9)
POTASSIUM SERPL-SCNC: 4.7 MMOL/L (ref 3.5–5.1)
PROT SERPL-MCNC: 7.5 G/DL (ref 6–8.4)
RBC # BLD AUTO: 4.99 M/UL (ref 4–5.4)
SODIUM SERPL-SCNC: 138 MMOL/L (ref 136–145)
TSH SERPL DL<=0.005 MIU/L-ACNC: 1.39 UIU/ML (ref 0.4–4)
WBC # BLD AUTO: 4.24 K/UL (ref 3.9–12.7)

## 2024-11-15 PROCEDURE — 36415 COLL VENOUS BLD VENIPUNCTURE: CPT | Performed by: INTERNAL MEDICINE

## 2024-11-15 PROCEDURE — 99999 PR PBB SHADOW E&M-EST. PATIENT-LVL II: CPT | Mod: PBBFAC,,, | Performed by: INTERNAL MEDICINE

## 2024-11-15 PROCEDURE — 80053 COMPREHEN METABOLIC PANEL: CPT | Performed by: INTERNAL MEDICINE

## 2024-11-15 PROCEDURE — 84443 ASSAY THYROID STIM HORMONE: CPT | Performed by: INTERNAL MEDICINE

## 2024-11-15 PROCEDURE — 83880 ASSAY OF NATRIURETIC PEPTIDE: CPT | Performed by: INTERNAL MEDICINE

## 2024-11-15 PROCEDURE — 83880 ASSAY OF NATRIURETIC PEPTIDE: CPT | Mod: 91 | Performed by: INTERNAL MEDICINE

## 2024-11-15 PROCEDURE — 83735 ASSAY OF MAGNESIUM: CPT | Performed by: INTERNAL MEDICINE

## 2024-11-15 PROCEDURE — 85025 COMPLETE CBC W/AUTO DIFF WBC: CPT | Performed by: INTERNAL MEDICINE

## 2024-11-15 NOTE — PROGRESS NOTES
Subjective:       HPI:  Ms. Reyes is a 64 y.o. year old with a Hx of factor V leiden, HTN, dyslipidemia who comes for evaluation (self referred as I follow her  for HF) of recurrent episodes of chest pain associated with palpitations and nausea. These episodes do not appear to be triggered by exertion. Of note, she had a cardiac work-up that included cardiac enzymes (negative), stress Echo (preserved LV function with no evidence of stress induced myocardial ischemia). She was on a DOAC for her previous DVT but this has been discontinued by her hematologist as her DVT had resolved and patient was having significant GI side effects from Xarelto.      stress Echo done on 10/24/2024   Left Ventricle: The left ventricle is normal in size. Normal wall thickness. There is normal systolic function with a visually estimated ejection fraction of 55 - 60%. Grade I diastolic dysfunction.    Right Ventricle: Normal right ventricular cavity size. Systolic function is normal.    Pulmonary Artery: The estimated pulmonary artery systolic pressure is 24 mmHg.    Stress Protocol: The patient was infused intravenously with dobutamine. The patient received a graduated infusion of the stress agent beginning at  mcg/kg/min . The peak heart rate was 132 bpm, which is 88% of age predicted maximum heart rate. Low-level exercise was used. The patient reported no symptoms during the stress test. The test was stopped because the end of the protocol was reached.    ECG Conclusion: The ECG portion of the study is negative for ischemia.    Post-stress Conclusion: The study is negative with no echocardiographic evidence of stress induced ischemia.    CTA of chest done on 10/23/2024 was negative for PE    Past Medical History:   Diagnosis Date    Allergy     Essential hypertension 11/11/2024    Fibrocystic breast     History of cholecystectomy     Personal history of colonic polyps 02/23/2024     Past Surgical History:   Procedure  "Laterality Date    BIOPSY  10/28/2020    BREAST BIOPSY Right 10/28/2020    Benign fibrotic breast tissue with fibroadenomatoid change and duct ectasia    CHOLECYSTECTOMY      COLONOSCOPY N/A 2024    Procedure: COLONOSCOPY;  Surgeon: Michelle Baker MD;  Location: [facility] ENDO;  Service: Endoscopy;  Laterality: N/A;  Referral: Vivi Stiles MD / [facility] / Inst portal / LW   pt rescheduled, Suprep, portal -ml  - pt states she is not starting her Xarelto until after her colonoscopy / pc complete. DBM    ESOPHAGOGASTRODUODENOSCOPY N/A 2023    Procedure: EGD (ESOPHAGOGASTRODUODENOSCOPY);  Surgeon: Tom Landeros MD;  Location: [facility] ENDO;  Service: Endoscopy;  Laterality: N/A;    HERNIA REPAIR      VAGINAL DELIVERY       OB History          2    Para   2    Term   2            AB        Living             SAB        IAB        Ectopic        Multiple        Live Births               Obstetric Comments   Menopause @ age 57, denies HRT  Denies abnl pap, last pap  neg  Denies abnl MMG,   c-scope in .             Review of Systems   Constitutional: Positive for malaise/fatigue. Negative for chills, decreased appetite, diaphoresis, fever, night sweats, weight gain and weight loss.   Eyes: Negative.    Cardiovascular:  Positive for chest pain and palpitations. Negative for claudication, cyanosis, dyspnea on exertion, irregular heartbeat, leg swelling, near-syncope, orthopnea, paroxysmal nocturnal dyspnea and syncope.   Respiratory:  Negative for cough, hemoptysis and shortness of breath.    Endocrine: Negative.    Hematologic/Lymphatic: Negative.    Skin:  Negative for color change, dry skin and nail changes.   Musculoskeletal: Negative.    Gastrointestinal:  Positive for nausea.   Genitourinary: Negative.    Neurological:  Negative for weakness.       Objective:   Blood pressure 119/60, pulse 75, height 5' 7" (1.702 m), weight 81.4 kg (179 lb 7.3 oz), last menstrual period " 05/11/2017.body mass index is 28.11 kg/m².  Physical Exam  Vitals and nursing note reviewed.   Constitutional:       Appearance: She is well-developed.   HENT:      Head: Normocephalic.   Eyes:      Pupils: Pupils are equal, round, and reactive to light.   Neck:      Vascular: No JVD.   Cardiovascular:      Rate and Rhythm: Normal rate and regular rhythm.      Chest Wall: PMI is not displaced.      Pulses: Intact distal pulses.      Heart sounds: Normal heart sounds. No murmur heard.     No friction rub. No gallop.   Pulmonary:      Effort: Pulmonary effort is normal.      Breath sounds: Normal breath sounds. No wheezing or rales.   Abdominal:      General: Bowel sounds are normal.      Palpations: Abdomen is soft.   Musculoskeletal:      Cervical back: Neck supple.   Neurological:      Mental Status: She is alert and oriented to person, place, and time.         Labs:    Chemistry        Component Value Date/Time     11/15/2024 0821    K 4.7 11/15/2024 0821     11/15/2024 0821    CO2 26 11/15/2024 0821    BUN 14 11/15/2024 0821    CREATININE 0.7 11/15/2024 0821    GLU 86 11/15/2024 0821        Component Value Date/Time    CALCIUM 9.8 11/15/2024 0821    ALKPHOS 83 11/15/2024 0821    AST 21 11/15/2024 0821    ALT 16 11/15/2024 0821    BILITOT 0.5 11/15/2024 0821    ESTGFRAFRICA >60.0 09/30/2021 0820    EGFRNONAA >60.0 09/30/2021 0820          Magnesium   Date Value Ref Range Status   11/15/2024 2.2 1.6 - 2.6 mg/dL Final     Lab Results   Component Value Date    WBC 4.24 11/15/2024    HGB 13.7 11/15/2024    HCT 41.0 11/15/2024     11/15/2024     Lab Results   Component Value Date    INR 0.9 10/23/2024     BNP   Date Value Ref Range Status   11/15/2024 14 0 - 99 pg/mL Final     Comment:     Values of less than 100 pg/ml are consistent with non-CHF populations.   06/27/2023 28 0 - 99 pg/mL Final     Comment:     Values of less than 100 pg/ml are consistent with non-CHF populations.       Assessment:       1. Diastolic dysfunction    2. Palpitations    3. Chest pain, unspecified type        Plan:   Recurrent episodes of chest pain associated with nausea and palpitations. I have reviewed her stress Echo which was negative for ischemia. CTA was negative for PE.  I do recommend a 30 day event monitor to r/o arrhythmias   Also trial of PPI (had previous GI issues)  Also follow-up with US of her breast as recommended by her PCP  Recommend 2 gram sodium restriction and 1500cc fluid restriction.  Encourage physical activity with graded exercise program.  Requested patient to weigh themselves daily, and to notify us if their weight increases by more than 3 lbs in 1 day or 5 lbs in 1 week.     Advance Care Planning   Patient has ACP docs in file.     Taty Bain MD

## 2024-11-16 LAB — NT-PROBNP SERPL IA-MCNC: <36 PG/ML

## 2024-11-22 ENCOUNTER — HOSPITAL ENCOUNTER (OUTPATIENT)
Dept: RADIOLOGY | Facility: CLINIC | Age: 64
Discharge: HOME | End: 2024-11-22
Attending: FAMILY MEDICINE
Payer: MEDICARE

## 2024-11-22 DIAGNOSIS — Z78.0 MENOPAUSE: ICD-10-CM

## 2024-11-22 PROCEDURE — 77080 DXA BONE DENSITY AXIAL: CPT | Mod: 26,,, | Performed by: INTERNAL MEDICINE

## 2024-11-22 PROCEDURE — 77080 DXA BONE DENSITY AXIAL: CPT | Mod: TC,PO

## 2024-11-25 ENCOUNTER — HOSPITAL ENCOUNTER (OUTPATIENT)
Dept: RADIOLOGY | Facility: HOSPITAL | Age: 64
Discharge: HOME | End: 2024-11-25
Attending: FAMILY MEDICINE
Payer: MEDICARE

## 2024-11-25 DIAGNOSIS — R92.8 ABNORMAL MAMMOGRAM: ICD-10-CM

## 2024-11-25 PROCEDURE — 77061 BREAST TOMOSYNTHESIS UNI: CPT | Mod: 26,LT,, | Performed by: RADIOLOGY

## 2024-11-25 PROCEDURE — 77061 BREAST TOMOSYNTHESIS UNI: CPT | Mod: TC,LT

## 2024-11-25 PROCEDURE — 77065 DX MAMMO INCL CAD UNI: CPT | Mod: 26,LT,, | Performed by: RADIOLOGY

## 2024-12-02 ENCOUNTER — TELEPHONE (OUTPATIENT)
Dept: CARDIOLOGY | Facility: HOSPITAL | Age: 64
End: 2024-12-02
Payer: MEDICARE

## 2024-12-02 NOTE — TELEPHONE ENCOUNTER
A follow-up call was made to Ms. Reyes to verify the mailing address. No response was received, and a brief message was left on voicemail asking for a callback to verify the mailing address before sending the monitor out. A direct number was provided on Ms.Reyes voicemail to return my call.

## 2024-12-02 NOTE — TELEPHONE ENCOUNTER
----- Message from Zarina sent at 12/2/2024 11:36 AM CST -----  Regarding: Monitor  Pt [phone] calling in ref to her scheduled event monitor. She says someone told her it was being mailed, but it wasn't noted to mail out to pt. I did note the account to mail to pt, but she would like a call from you.    Thanks   Per Enrique Abdi is 2/11  CM awaiting [facility] approval and delivery

## 2024-12-04 NOTE — PROGRESS NOTES
Assessment: 64 y.o. female with R plantar fasciitis     I explained my diagnostic impression and the reasoning behind it in detail, using layman's terms.       Plan:   - Instructed in stretches   - Voltaren gel QID   - PT referral placed  - Return to clinic in 12 weeks. Return sooner if symptoms worsen or fail to improve.    All questions were answered in detail. The patient is in full agreement with the treatment plan and will proceed accordingly.    Chief Complaint   Patient presents with    Right Foot - Pain, Injury    Right Ankle - Pain, Injury     Initial visit (12/9/24): Cloie Bodden Reyes is a 64 y.o. female who presents today complaining of L achilles pain.   Duration of symptoms:  about 4 months  Trauma or new activity: yes - stepped off a ladder and twisted her ankle   Saw Dr Damian - states that she had an MRI done at their office - does not have disc or report. Showed some issue with her achilles  No medications  Pain worse with putting foot down flat, cold weather, weight bearing   Pain is constant with intermittent worsening  Prior treatment:     Tylenol helpful  Allergic to Ibuprofen, tolerates ASA   She states she is not allergic to aleve but was told to not take it because of her heart   Has voltaren gel - uses occasionally, does not use several times a day   PT was prescribed by Dr Damian but she did not do it - insurance issue.  She stopped going back to Dr Damian because she had to pay out of pocket   Had a CAM boot which she states caused her pain to worsen to stop using   Pain does interfere with activities of daily living .     This is the extent of the patient's complaints at this time.      Occupation: Retired       Review of patient's allergies indicates:   Allergen Reactions    Ibuprofen Other (See Comments)     Leg and hand       Current Outpatient Medications:     aspirin (ECOTRIN) 81 MG EC tablet, Take 81 mg by mouth once. , Disp: , Rfl:     atorvastatin (LIPITOR) 20 MG tablet, Take 1  tablet (20 mg total) by mouth every evening., Disp: 90 tablet, Rfl: 3    azelastine (ASTELIN) 137 mcg (0.1 %) nasal spray, 1 spray (137 mcg total) by Nasal route 2 (two) times daily., Disp: 15 mL, Rfl: 2    loratadine (CLARITIN) 10 mg tablet, Take 1 tablet (10 mg total) by mouth daily as needed., Disp: 30 tablet, Rfl: 0    Physical Exam:   Vitals:    12/09/24 0916   PainSc:   5   PainLoc: Foot       General: Patient is alert, awake and oriented to time, place and person. Mood and affect are appropriate.  Patient does not appear to be in any distress, denies any constitutional symptoms and appears stated age.   HEENT:  Pupils are equal and round, sclera are not injected. External examination of ears and nose reveals no abnormalities. Cranial nerves II-X are grossly intact  Skin:  no rashes, abrasions or open wounds on the affected extremity   Resp:  No respiratory distress or audible wheezing   CV: 2+  pulses, all extremities warm and well perfused   Right Lower Extremity   Tender over PF insertion on calcaneus   Nontender over Achilles insertion  Dorsiflexion to -5  Ltsi s/s/sp/dp/t  + ehl/fhl/ta/gs  2+ DP      Imaging:  Three views of the right ankle show spurring to the posterior calcaneus.  No arthritis    I personally reviewed and interpreted the patient's imaging obtained today in clinic     This note was created by combination of typed  and M-Modal dictation. Transcription and phonetic errors may be present.  If there are any questions, please contact me.    Past Medical History:   Diagnosis Date    Allergy     Essential hypertension 11/11/2024    Fibrocystic breast     History of cholecystectomy     Personal history of colonic polyps 02/23/2024       Active Problem List with Overview Notes    Diagnosis Date Noted    Hyperlipidemia 11/11/2024    Essential hypertension 11/11/2024    Chest pain, atypical 10/23/2024    Indeterminate pulmonary nodules 10/23/2024    Primary snoring 04/10/2024     much  improved since weight loss.  Low pretest probability for dante per stopbang criteria.       Nasal congestion 04/10/2024     Recent viral illness  Recommend sinus irriation      Homozygous Factor V Leiden mutation 04/01/2024    History of DVT in adulthood 02/14/2024    Bradycardia 06/07/2023    Weakness 06/07/2023    Arthritis 11/17/2019    Headache 11/17/2019       Past Surgical History:   Procedure Laterality Date    BIOPSY  10/28/2020    BREAST BIOPSY Right 10/28/2020    Benign fibrotic breast tissue with fibroadenomatoid change and duct ectasia    CHOLECYSTECTOMY      COLONOSCOPY N/A 2/23/2024    Procedure: COLONOSCOPY;  Surgeon: Michelle Baker MD;  Location: [facility] ENDO;  Service: Endoscopy;  Laterality: N/A;  Referral: Vivi Stiles MD / [place] / Inst portal / LW  2/14 pt rescheduled, Suprep, portal -ml  2/16- pt states she is not starting her Xarelto until after her colonoscopy / pc complete. DBM    ESOPHAGOGASTRODUODENOSCOPY N/A 12/28/2023    Procedure: EGD (ESOPHAGOGASTRODUODENOSCOPY);  Surgeon: Tom Landeros MD;  Location: [facility] ENDO;  Service: Endoscopy;  Laterality: N/A;    HERNIA REPAIR      VAGINAL DELIVERY         Family History   Problem Relation Name Age of Onset    Cancer Mother          kidney    Endometrial cancer Mother      Diabetes Father      Breast cancer Sister      Diabetes Sister      No Known Problems Sister      Endometrial cancer Maternal Grandmother      Stroke Brother      Diabetes Brother      Clotting disorder Son      No Known Problems Son      Amblyopia Neg Hx      Blindness Neg Hx      Cataracts Neg Hx      Glaucoma Neg Hx      Macular degeneration Neg Hx      Retinal detachment Neg Hx      Strabismus Neg Hx

## 2024-12-09 ENCOUNTER — OFFICE VISIT (OUTPATIENT)
Dept: ORTHOPEDICS | Facility: CLINIC | Age: 64
End: 2024-12-09
Payer: MEDICARE

## 2024-12-09 DIAGNOSIS — M79.672 LEFT FOOT PAIN: ICD-10-CM

## 2024-12-09 DIAGNOSIS — M72.2 PLANTAR FASCIITIS OF RIGHT FOOT: Primary | ICD-10-CM

## 2024-12-09 PROCEDURE — 3044F HG A1C LEVEL LT 7.0%: CPT | Mod: CPTII,S$GLB,, | Performed by: ORTHOPAEDIC SURGERY

## 2024-12-09 PROCEDURE — 99999 PR PBB SHADOW E&M-EST. PATIENT-LVL IV: CPT | Mod: PBBFAC,,, | Performed by: ORTHOPAEDIC SURGERY

## 2024-12-09 PROCEDURE — 1157F ADVNC CARE PLAN IN RCRD: CPT | Mod: CPTII,S$GLB,, | Performed by: ORTHOPAEDIC SURGERY

## 2024-12-09 PROCEDURE — 3288F FALL RISK ASSESSMENT DOCD: CPT | Mod: CPTII,S$GLB,, | Performed by: ORTHOPAEDIC SURGERY

## 2024-12-09 PROCEDURE — 1159F MED LIST DOCD IN RCRD: CPT | Mod: CPTII,S$GLB,, | Performed by: ORTHOPAEDIC SURGERY

## 2024-12-09 PROCEDURE — 99203 OFFICE O/P NEW LOW 30 MIN: CPT | Mod: S$GLB,,, | Performed by: ORTHOPAEDIC SURGERY

## 2024-12-09 PROCEDURE — 1100F PTFALLS ASSESS-DOCD GE2>/YR: CPT | Mod: CPTII,S$GLB,, | Performed by: ORTHOPAEDIC SURGERY

## 2024-12-09 NOTE — PATIENT INSTRUCTIONS
WHAT IS PLANTAR FASCIITIS?  If your first few steps out of bed in the morning cause severe pain in the heel of your foot, you may have plantar fasciitis, an overuse injury that affects the sole of the foot. A diagnosis of plantar fasciitis means you have inflamed the tough, fibrous band of tissue (fascia) connecting your heel bone to the base of your toes.     https://orthoinfo.aaos.org/globalassets/pdfs/planter-fasciitis.pdf    Causes  You're more likely to develop the condition if you're female, overweight or have a job that requires a lot of walking or standing on hard surfaces. You're also at risk if you walk or run for exercise, especially if you have tight calf muscles that limit how far you can flex your ankles. People with very flat feet or very high arches also are more prone to plantar fasciitis.    Symptoms  Plantar fasciitis typically starts gradually with mild pain at the heel bone often referred to as a stone bruise. You're more likely to feel it after (not during) exercise. The pain classically occurs right after getting up in the morning and after a period of sitting. If you don't treat plantar fasciitis, it may become a chronic condition. You may not be able to keep up your level of activity, and you may develop symptoms of foot, knee, hip and back problems because plantar fasciitis can change the way you walk.    Treatments  Stretching is the best treatment for plantar fasciitis. It may help to try to keep weight off your foot until the initial inflammation goes away. You can also apply ice to the sore area for 20 minutes three or four times a day to relieve your symptoms. Often a doctor will prescribe a nonsteroidal anti-inflammatory medication such as ibuprofen or naproxen. Home exercises to stretch your Achilles tendon and plantar fascia are the mainstay of treatment and reduce the chance of recurrence.    In one exercise, you lean forward against a wall with one knee straight and heel on the  ground. Your other knee is bent. Your heel cord and foot arch stretch as you lean. Hold for 10 seconds, relax and straighten up. Repeat 20 times for each sore heel. It is important to keep the knee fully extended on the side being stretched.     Stretch for plantar fasciitisIn another exercise, you lean forward onto a countertop, spreading your feet apart with one foot in front of the other. Flex your knees and squat down, keeping your heels on the ground as long as possible. Your heel cords and foot arches will stretch as the heels come up in the stretch. Hold for 10 seconds, relax and straighten up. Repeat 20 times.    About 90 percent of people with plantar fasciitis improve significantly after two months of initial treatment. You may be advised to use shoes with shock-absorbing soles or fitted with an off-the-shelf shoe insert device like a rubber heel pad. Your foot may be taped into a specific position.    If your plantar fasciitis continues after a few months of conservative treatment, your doctor may inject your heel with steroidal anti-inflammatory medication.If you still have symptoms, you may need to wear a walking cast for two to three weeks or a positional splint when you sleep. In a few cases, surgery is needed for chronically contracted tissue.     Plantar Fascia-Specific Stretching Program    1.) Cross your affected leg over your other leg.  2.) Using the hand on your affected side, take hold of your affected foot and pull your toes back towards shin. This creates tension/stretch in the arch of the foot/plantar fascia.  3.) Check for the appropriate stretch position by gently rubbing the thumb of your unaffected side left to right over the arch of the affected foot. The plantar fascia should feel firm, like a guitar string.  4.) Hold the stretch for a count of 10. A set is 10 repetitions.    Perform at least three sets of stretches per day. You cannot perform the stretch too often. The most important  times to stretch are before taking the first step in the morning and before standing after a period of prolonged sitting.    Additional Stretch: Achilles Tendon Stretch    1.) Place a shoe insert under your affected foot.  2.) Place your affected leg behind your unaffected leg with the toes of your back foot pointed towards the heel of your other foot.  3.) Lean into the wall.  4.) Bend your front knee while keeping your back leg straight with your heel firmly on the ground.  5.) Hold the stretch for a count of 10. A set is 10 repetitions.  6.) Perform the stretch at least three times a day.    Anti-inflammatory medications can help decrease the inflammation in the arch and heel of your foot. These medications include Advil®, Motrin®, Ibuprofen, and Aleve®. Use the medication as directed on the package. If you tolerate it well, take it daily for two weeks then discontinue for one week. If symptoms worsen or return, resume for two weeks, then stop. You should eat when taking these medications, as they can be hard on your stomach.    Over the counter inserts provide added arch support and soft cushion. Some patients will require custom inserts, and your surgeon may provide a prescription for these, but they can be an expensive out of pocket expense if your insurance does not cover them.    https://www.footcaremd.org/conditions-treatments/heel/plantar-fasciitis  The American [employer] (AOFAS) offers information on this site as an educational service. The content of [facility], including text, images, and graphics, is for informational purposes only. The content is not intended to substitute for professional medical advice, diagnoses or treatments.      Plantar Fasciitis Exercises  Toe Curls With Towel    1. Place a small towel on the floor. Using involved foot, curl towel toward you, using only your toes. Relax.  2. Repeat 10 times, 1-2 times per day.    Toe Extension    1. Sit with involved leg  crossed over uninvolved leg. Grasp toes with one hand and bend the toes and ankle upwards as far as possible to stretch the arch and calf muscle. With the other hand, perform deep massage along the arch of your foot.  2. Hold 10 seconds. Repeat for 2-3 minutes. Repeat 2-4 sessions per day.    Standing Calf Stretch    1. Stand placing hands on wall for support. Place your feet pointing straight ahead, with the involved foot in back of the other. The back leg should have a straight knee and front leg a bent knee. Shift forward, keeping back leg heel on the ground, so that you feel a stretch in the calf muscle of the back leg.  2. Hold 45 seconds, 2-3 times. Repeat 4-6 times per day.    Towel Stretch    The towel stretch is effective at reducing morning pain if done before getting out of bed.  1. Sit with involved leg straight out in front of you. Place a towel around your foot and gently pull toward you, feeling a stretch in your calf muscle.  2. Hold 45 seconds, 2-3 times. Repeat 4-6 times per day.     Calf Stretch on a Step    1. Stand with uninvolved foot flat on a step. Place involved ball of foot on the edge of the step. Gently let heel lower on involved leg to feel a stretch in your calf.  2. Hold 45 seconds, 2-3 times. Repeat 4-6 times per day.    Ice Massage Arch Roll    1. With involved foot resting on a frozen can or water bottle, golf ball, or tennis ball, roll your foot back and forth over the object.  2. Repeat for 3-5 minutes, 2 times per day.      Adapted from [email or web address]/Patient-Education/Educational-Materials/[email or web address]-Fasciitis-Exercises.aspx

## 2025-01-03 ENCOUNTER — CLINICAL SUPPORT (OUTPATIENT)
Dept: CARDIOLOGY | Facility: HOSPITAL | Age: 65
End: 2025-01-03
Attending: INTERNAL MEDICINE
Payer: MEDICARE

## 2025-01-03 DIAGNOSIS — R00.2 PALPITATIONS: ICD-10-CM

## 2025-01-03 PROCEDURE — 93271 ECG/MONITORING AND ANALYSIS: CPT

## 2025-01-06 ENCOUNTER — DOCUMENTATION ONLY (OUTPATIENT)
Dept: CARDIOLOGY | Facility: HOSPITAL | Age: 65
End: 2025-01-06
Payer: MEDICARE

## 2025-01-06 NOTE — PROGRESS NOTES
Patient wearing 30 day event monitor for diagnosis palpitations     Received auto-triggered alert notification for NSVT on January 5, 2025 at 4: 33 PM     Dr. Bain notified on 1/5/24 @ 5:17 PM.    Strips placed under this encounter. Will continue to monitor until 2/1/2025.

## 2025-03-24 DIAGNOSIS — Z00.00 ENCOUNTER FOR MEDICARE ANNUAL WELLNESS EXAM: ICD-10-CM

## 2025-03-28 ENCOUNTER — OFFICE VISIT (OUTPATIENT)
Dept: FAMILY MEDICINE | Facility: CLINIC | Age: 65
End: 2025-03-28
Payer: MEDICARE

## 2025-03-28 VITALS
WEIGHT: 188.25 LBS | BODY MASS INDEX: 29.55 KG/M2 | SYSTOLIC BLOOD PRESSURE: 120 MMHG | DIASTOLIC BLOOD PRESSURE: 78 MMHG | HEIGHT: 67 IN | OXYGEN SATURATION: 100 % | HEART RATE: 81 BPM

## 2025-03-28 DIAGNOSIS — R04.0 EPISTAXIS: ICD-10-CM

## 2025-03-28 DIAGNOSIS — R73.03 PRE-DIABETES: ICD-10-CM

## 2025-03-28 DIAGNOSIS — R91.8 INDETERMINATE PULMONARY NODULES: ICD-10-CM

## 2025-03-28 DIAGNOSIS — R51.9 INTRACTABLE HEADACHE, UNSPECIFIED CHRONICITY PATTERN, UNSPECIFIED HEADACHE TYPE: ICD-10-CM

## 2025-03-28 DIAGNOSIS — E78.5 HYPERLIPIDEMIA, UNSPECIFIED HYPERLIPIDEMIA TYPE: ICD-10-CM

## 2025-03-28 DIAGNOSIS — I10 ESSENTIAL HYPERTENSION: ICD-10-CM

## 2025-03-28 DIAGNOSIS — R91.1 SOLITARY PULMONARY NODULE: ICD-10-CM

## 2025-03-28 DIAGNOSIS — R41.3 OTHER AMNESIA: Primary | ICD-10-CM

## 2025-03-28 PROCEDURE — 1157F ADVNC CARE PLAN IN RCRD: CPT | Mod: CPTII,S$GLB,, | Performed by: FAMILY MEDICINE

## 2025-03-28 PROCEDURE — 1159F MED LIST DOCD IN RCRD: CPT | Mod: CPTII,S$GLB,, | Performed by: FAMILY MEDICINE

## 2025-03-28 PROCEDURE — 1160F RVW MEDS BY RX/DR IN RCRD: CPT | Mod: CPTII,S$GLB,, | Performed by: FAMILY MEDICINE

## 2025-03-28 PROCEDURE — 3078F DIAST BP <80 MM HG: CPT | Mod: CPTII,S$GLB,, | Performed by: FAMILY MEDICINE

## 2025-03-28 PROCEDURE — 3008F BODY MASS INDEX DOCD: CPT | Mod: CPTII,S$GLB,, | Performed by: FAMILY MEDICINE

## 2025-03-28 PROCEDURE — 99999 PR PBB SHADOW E&M-EST. PATIENT-LVL III: CPT | Mod: PBBFAC,,, | Performed by: FAMILY MEDICINE

## 2025-03-28 PROCEDURE — 3288F FALL RISK ASSESSMENT DOCD: CPT | Mod: CPTII,S$GLB,, | Performed by: FAMILY MEDICINE

## 2025-03-28 PROCEDURE — 99214 OFFICE O/P EST MOD 30 MIN: CPT | Mod: S$GLB,,, | Performed by: FAMILY MEDICINE

## 2025-03-28 PROCEDURE — 3074F SYST BP LT 130 MM HG: CPT | Mod: CPTII,S$GLB,, | Performed by: FAMILY MEDICINE

## 2025-03-28 PROCEDURE — G2211 COMPLEX E/M VISIT ADD ON: HCPCS | Mod: S$GLB,,, | Performed by: FAMILY MEDICINE

## 2025-03-28 PROCEDURE — 1101F PT FALLS ASSESS-DOCD LE1/YR: CPT | Mod: CPTII,S$GLB,, | Performed by: FAMILY MEDICINE

## 2025-03-31 NOTE — PROGRESS NOTES
Routine Office Visit     Patient Name: Cloie Bodden Reyes    : 1960  MRN: [id number]    Subjective     History of Present Illness    CHIEF COMPLAINT:  Patient presents with concerns about nosebleeds, occasional pain on one side of the head, and memory issues.    HPI:  Patient reports nosebleeds for about 2 weeks, characterized by clots when blowing her nose rather than continuous bleeding. She also mentions occasional pain on one side of her head, which she distinguishes from a typical headache. Patient emphasizes that she does not usually have headaches, and this pain is intermittent.    Patient reports memory loss, describing difficulty remembering where she places items and recent actions. Her family members, including her children and sister, have noticed this change and frequently call to check on her.    Patient had a recent health scare where she believed she was having a heart attack. She drove herself to urgent care, where the doctor called an ambulance to transport her to the hospital. A cardiac workup and stress echo were performed, both of which were negative for heart attack. A DVT was resolved. Dr. Bain, her cardiologist, recommended a 30-day event monitor, which patient completed.    Patient reports weakness at times, which she attributes to not eating or possibly to aging. She mentions falling and tearing her Achilles tendon, though the timing of this injury is not specified.    Patient traveled to [place] last year and became ill.    Patient has a history of breast cancer. She mentions having regular mammograms and being told she needs to do them annually.    Patient denies having headaches, dizziness, active bleeding from her nose, GI issues, blood clots in her lungs or legs, and current abdominal pain, nausea, or vomiting.    MEDICAL HISTORY:  Patient has a history of breast cancer and a heart condition with mentions of blockage and leakage. She also had DVT which has resolved. Patient has  "received the COVID-19 vaccine in the past. Patient's last Pap smear was in June 2024.    FAMILY HISTORY:  Family history is significant for uterine cancer in mother, aunt, and grandmother.    TEST RESULTS:  Patient underwent a stress echo test, which was negative for heart attack. She also completed a 30-day event monitor.    IMAGING:  A CTA Chest was performed, showing no blood clot in the lungs.    ALLERGIES:  Patient experiences seasonal allergies, which manifest as nosebleeds and nasal irritation.    SOCIAL HISTORY:  Occupation: Not working    Marital status:       ROS:  General: no fever, no chills, no fatigue, no weight gain, no weight loss, +weakness  Eyes: no vision changes, no redness, no discharge  ENT: no ear pain, no nasal congestion, no sore throat, +nosebleeds  Cardiovascular: no chest pain, no palpitations, no lower extremity edema  Respiratory: no cough, no shortness of breath  Gastrointestinal: no abdominal pain, no nausea, no vomiting, no diarrhea, no constipation, no blood in stool  Genitourinary: no dysuria, no hematuria, no frequency  Musculoskeletal: no joint pain, no muscle pain  Skin: no rash, no lesion  Neurological: +headache, no dizziness, no numbness, no tingling, +memory loss  Psychiatric: no anxiety, no depression, no sleep difficulty  Head: +head pain           Objective     /78 (BP Location: Left arm, Patient Position: Sitting)   Pulse 81   Ht 5' 7" (1.702 m)   Wt 85.4 kg (188 lb 4.4 oz)   LMP 05/11/2017 (Approximate)   SpO2 100%   BMI 29.49 kg/m²   Physical Exam  Constitutional:       Appearance: She is well-developed.   HENT:      Head: Normocephalic and atraumatic.   Eyes:      Conjunctiva/sclera: Conjunctivae normal.      Pupils: Pupils are equal, round, and reactive to light.   Cardiovascular:      Rate and Rhythm: Normal rate and regular rhythm.      Heart sounds: Normal heart sounds. No murmur heard.     No friction rub. No gallop.   Pulmonary:      Effort: " No respiratory distress.      Breath sounds: Normal breath sounds.   Abdominal:      General: Bowel sounds are normal. There is no distension.      Palpations: Abdomen is soft.      Tenderness: There is no abdominal tenderness.   Musculoskeletal:         General: Normal range of motion.      Cervical back: Normal range of motion and neck supple.   Lymphadenopathy:      Cervical: No cervical adenopathy.   Skin:     General: Skin is warm.   Neurological:      Mental Status: She is alert and oriented to person, place, and time.           Assessment     Assessment & Plan    PLAN SUMMARY:   Order fasting labs for Wednesday at 8:30 AM   Order labs for further evaluation   Order CT Head due to localized pain   Use a humidifier to help manage allergies and reduce nasal irritation   Eat less and exercise for weight management   Continue follow-up with cardiologist   Schedule next Pap smear   Annual mammogram recommended   Follow up in November   Contact the office before November if needed    WEAKNESS:   Patient reports experiencing weakness.   Planned to order labs for further evaluation.    GENERAL HEALTH MANAGEMENT:   Explained importance of maintaining a healthy weight.   Patient to eat less and exercise for weight management.   Ordered fasting labs for Wednesday at 8:30 AM.    FOLLOW-UP:   Follow up in November.   Contact the office before November if needed.         Other amnesia  -     CT Head Without Contrast; Future; Expected date: 03/28/2025   Patient reports experiencing memory loss.   Considered ordering a CT of the head due to associated pain.   Ordered CT Head due to localized pain, despite absence of typical headache symptoms.    Intractable headache, unspecified chronicity pattern, unspecified headache type  -     CT Head Without Contrast; Future; Expected date: 03/28/2025    Hyperlipidemia, unspecified hyperlipidemia type  -     CBC Auto Differential; Future; Expected date: 03/28/2025  -     Comprehensive  Metabolic Panel; Future; Expected date: 03/28/2025  -     Lipid Panel; Future; Expected date: 03/28/2025  -     TSH; Future; Expected date: 03/28/2025    Essential hypertension  -     CBC Auto Differential; Future; Expected date: 03/28/2025  -     Comprehensive Metabolic Panel; Future; Expected date: 03/28/2025  -     Lipid Panel; Future; Expected date: 03/28/2025  -     TSH; Future; Expected date: 03/28/2025    Pre-diabetes  -     Hemoglobin A1C; Future; Expected date: 03/28/2025    Indeterminate pulmonary nodules  -     CT Chest Without Contrast; Future; Expected date: 03/31/2025    Solitary pulmonary nodule  -     CT Chest Without Contrast; Future; Expected date: 03/31/2025    Epistaxis   Evaluated the patient's nosebleeds, likely due to allergies and environmental factors such as weather changes and dry air.   Explained the potential link between allergies and epistaxis to the patient.   Discussed the impact of weather changes and indoor heating/cooling on nasal passages.   Recommend using a humidifier to help with nasal irritation and epistaxis.   Patient reports experiencing nosebleeds with clots for approximately 2 weeks.            This note was generated with the assistance of ambient listening technology. Verbal consent was obtained by the patient and accompanying visitor(s) for the recording of patient appointment to facilitate this note. I attest to having reviewed and edited the generated note for accuracy, though some syntax or spelling errors may persist. Please contact the author of this note for any clarification.

## 2025-04-02 ENCOUNTER — LAB VISIT (OUTPATIENT)
Dept: LAB | Facility: HOSPITAL | Age: 65
End: 2025-04-02
Attending: FAMILY MEDICINE
Payer: MEDICARE

## 2025-04-02 DIAGNOSIS — R73.03 PRE-DIABETES: ICD-10-CM

## 2025-04-02 DIAGNOSIS — I10 ESSENTIAL HYPERTENSION: ICD-10-CM

## 2025-04-02 DIAGNOSIS — E78.5 HYPERLIPIDEMIA, UNSPECIFIED HYPERLIPIDEMIA TYPE: ICD-10-CM

## 2025-04-02 LAB
ABSOLUTE EOSINOPHIL (OHS): 0.16 K/UL
ABSOLUTE MONOCYTE (OHS): 0.3 K/UL (ref 0.3–1)
ABSOLUTE NEUTROPHIL COUNT (OHS): 1.73 K/UL (ref 1.8–7.7)
ALBUMIN SERPL BCP-MCNC: 3.7 G/DL (ref 3.5–5.2)
ALP SERPL-CCNC: 76 UNIT/L (ref 40–150)
ALT SERPL W/O P-5'-P-CCNC: 12 UNIT/L (ref 10–44)
ANION GAP (OHS): 7 MMOL/L (ref 8–16)
AST SERPL-CCNC: 16 UNIT/L (ref 11–45)
BASOPHILS # BLD AUTO: 0.05 K/UL
BASOPHILS NFR BLD AUTO: 1.2 %
BILIRUB SERPL-MCNC: 0.4 MG/DL (ref 0.1–1)
BUN SERPL-MCNC: 12 MG/DL (ref 8–23)
CALCIUM SERPL-MCNC: 9 MG/DL (ref 8.7–10.5)
CHLORIDE SERPL-SCNC: 106 MMOL/L (ref 95–110)
CHOLEST SERPL-MCNC: 220 MG/DL (ref 120–199)
CHOLEST/HDLC SERPL: 2.6 {RATIO} (ref 2–5)
CO2 SERPL-SCNC: 26 MMOL/L (ref 23–29)
CREAT SERPL-MCNC: 0.7 MG/DL (ref 0.5–1.4)
EAG (OHS): 111 MG/DL (ref 68–131)
ERYTHROCYTE [DISTWIDTH] IN BLOOD BY AUTOMATED COUNT: 12.2 % (ref 11.5–14.5)
GFR SERPLBLD CREATININE-BSD FMLA CKD-EPI: >60 ML/MIN/1.73/M2
GLUCOSE SERPL-MCNC: 91 MG/DL (ref 70–110)
HBA1C MFR BLD: 5.5 % (ref 4–5.6)
HCT VFR BLD AUTO: 41.4 % (ref 37–48.5)
HDLC SERPL-MCNC: 86 MG/DL (ref 40–75)
HDLC SERPL: 39.1 % (ref 20–50)
HGB BLD-MCNC: 13 GM/DL (ref 12–16)
IMM GRANULOCYTES # BLD AUTO: 0.01 K/UL (ref 0–0.04)
IMM GRANULOCYTES NFR BLD AUTO: 0.2 % (ref 0–0.5)
LDLC SERPL CALC-MCNC: 121.4 MG/DL (ref 63–159)
LYMPHOCYTES # BLD AUTO: 1.79 K/UL (ref 1–4.8)
MCH RBC QN AUTO: 26.9 PG (ref 27–31)
MCHC RBC AUTO-ENTMCNC: 31.4 G/DL (ref 32–36)
MCV RBC AUTO: 86 FL (ref 82–98)
NONHDLC SERPL-MCNC: 134 MG/DL
NUCLEATED RBC (/100WBC) (OHS): 0 /100 WBC
PLATELET # BLD AUTO: 209 K/UL (ref 150–450)
PMV BLD AUTO: 10.1 FL (ref 9.2–12.9)
POTASSIUM SERPL-SCNC: 4.7 MMOL/L (ref 3.5–5.1)
PROT SERPL-MCNC: 7 GM/DL (ref 6–8.4)
RBC # BLD AUTO: 4.83 M/UL (ref 4–5.4)
RELATIVE EOSINOPHIL (OHS): 4 %
RELATIVE LYMPHOCYTE (OHS): 44.3 % (ref 18–48)
RELATIVE MONOCYTE (OHS): 7.4 % (ref 4–15)
RELATIVE NEUTROPHIL (OHS): 42.9 % (ref 38–73)
SODIUM SERPL-SCNC: 139 MMOL/L (ref 136–145)
TRIGL SERPL-MCNC: 63 MG/DL (ref 30–150)
TSH SERPL-ACNC: 2.31 UIU/ML (ref 0.4–4)
WBC # BLD AUTO: 4.04 K/UL (ref 3.9–12.7)

## 2025-04-02 PROCEDURE — 80053 COMPREHEN METABOLIC PANEL: CPT

## 2025-04-02 PROCEDURE — 84443 ASSAY THYROID STIM HORMONE: CPT

## 2025-04-02 PROCEDURE — 85025 COMPLETE CBC W/AUTO DIFF WBC: CPT

## 2025-04-02 PROCEDURE — 80061 LIPID PANEL: CPT

## 2025-04-02 PROCEDURE — 83036 HEMOGLOBIN GLYCOSYLATED A1C: CPT

## 2025-04-02 PROCEDURE — 36415 COLL VENOUS BLD VENIPUNCTURE: CPT | Mod: PO

## 2025-04-03 ENCOUNTER — RESULTS FOLLOW-UP (OUTPATIENT)
Dept: FAMILY MEDICINE | Facility: CLINIC | Age: 65
End: 2025-04-03

## 2025-04-03 DIAGNOSIS — R91.8 PULMONARY NODULES: Primary | ICD-10-CM

## 2025-04-17 ENCOUNTER — HOSPITAL ENCOUNTER (OUTPATIENT)
Dept: RADIOLOGY | Facility: HOSPITAL | Age: 65
Discharge: HOME | End: 2025-04-17
Attending: FAMILY MEDICINE
Payer: MEDICARE

## 2025-04-17 DIAGNOSIS — R51.9 INTRACTABLE HEADACHE, UNSPECIFIED CHRONICITY PATTERN, UNSPECIFIED HEADACHE TYPE: ICD-10-CM

## 2025-04-17 DIAGNOSIS — R91.8 INDETERMINATE PULMONARY NODULES: ICD-10-CM

## 2025-04-17 DIAGNOSIS — R91.1 SOLITARY PULMONARY NODULE: ICD-10-CM

## 2025-04-17 DIAGNOSIS — R41.3 OTHER AMNESIA: ICD-10-CM

## 2025-04-17 PROCEDURE — 71250 CT THORAX DX C-: CPT | Mod: 26,,, | Performed by: RADIOLOGY

## 2025-04-17 PROCEDURE — 70450 CT HEAD/BRAIN W/O DYE: CPT | Mod: 26,,, | Performed by: RADIOLOGY

## 2025-04-17 PROCEDURE — 70450 CT HEAD/BRAIN W/O DYE: CPT | Mod: TC

## 2025-04-17 PROCEDURE — 71250 CT THORAX DX C-: CPT | Mod: TC

## 2025-06-20 ENCOUNTER — OFFICE VISIT (OUTPATIENT)
Dept: TRANSPLANT | Facility: CLINIC | Age: 65
End: 2025-06-20
Payer: MEDICARE

## 2025-06-20 DIAGNOSIS — I49.3 PVC'S (PREMATURE VENTRICULAR CONTRACTIONS): Primary | ICD-10-CM

## 2025-06-20 DIAGNOSIS — G47.33 OSA (OBSTRUCTIVE SLEEP APNEA): ICD-10-CM

## 2025-06-20 DIAGNOSIS — I10 ESSENTIAL HYPERTENSION: ICD-10-CM

## 2025-06-20 DIAGNOSIS — R00.2 PALPITATIONS: Primary | ICD-10-CM

## 2025-06-20 DIAGNOSIS — E78.2 MIXED HYPERLIPIDEMIA: ICD-10-CM

## 2025-06-20 DIAGNOSIS — D68.51 HOMOZYGOUS FACTOR V LEIDEN MUTATION: ICD-10-CM

## 2025-06-20 PROCEDURE — 99499 UNLISTED E&M SERVICE: CPT | Mod: S$GLB,,, | Performed by: INTERNAL MEDICINE

## 2025-06-20 RX ORDER — METOPROLOL SUCCINATE 25 MG/1
25 TABLET, EXTENDED RELEASE ORAL NIGHTLY
Qty: 90 TABLET | Refills: 3 | Status: SHIPPED | OUTPATIENT
Start: 2025-06-20 | End: 2026-06-20

## 2025-06-21 NOTE — PROGRESS NOTES
Subjective:       HPI:  Ms. Reyes is a 64 y.o. year old with a Hx of factor V leiden, HTN, dyslipidemia who came for evaluation (self referred as I follow her  for HF) of recurrent episodes of chest pain associated with palpitations and nausea. These episodes do not appear to be triggered by exertion. This is her 2nd visit with me (not scheduled as she is only accompanying her  for his clinic visit). Of note, she had a cardiac work-up that included cardiac enzymes (negative), stress Echo (preserved LV function with no evidence of stress induced myocardial ischemia). She was on a DOAC for her previous DVT but this has been discontinued by her hematologist as her DVT had resolved and patient was having significant GI side effects from Xarelto. I had ordered a 30 day event monitor that showed Sinus rhythm with a 13.1 second episode of nonsustained ventricular tachycardia with an average rate of 216 bpm. Today she reports similar episodes with similar frequency (occasional). Also reports feeling tired.     30 Day Event Monitor done on 1/3/2025  The baseline rhythm was sinus with a rate of 78 bpm.  There were 46 patient activations without a specific symptom entered corresponding to sinus rhythm with rare PACs/PVCs.  There were 3 auto-activations. 1 corresponded to sinus rhythm with a 13.1 second episode of nonsustained VT with an average rate of 216 bpm. Another corresponded to sinus tachycardia with a rate of 111 bpm. The other corresponded to sinus rhythm with a PAC and a rate of 85 bpm. The overall rate range across all activations was  bpm.      stress Echo done on 10/24/2024   Left Ventricle: The left ventricle is normal in size. Normal wall thickness. There is normal systolic function with a visually estimated ejection fraction of 55 - 60%. Grade I diastolic dysfunction.    Right Ventricle: Normal right ventricular cavity size. Systolic function is normal.    Pulmonary Artery: The estimated  pulmonary artery systolic pressure is 24 mmHg.    Stress Protocol: The patient was infused intravenously with dobutamine. The patient received a graduated infusion of the stress agent beginning at  mcg/kg/min . The peak heart rate was 132 bpm, which is 88% of age predicted maximum heart rate. Low-level exercise was used. The patient reported no symptoms during the stress test. The test was stopped because the end of the protocol was reached.    ECG Conclusion: The ECG portion of the study is negative for ischemia.    Post-stress Conclusion: The study is negative with no echocardiographic evidence of stress induced ischemia.     CTA of chest done on 10/23/2024 was negative for PE    Past Medical History:   Diagnosis Date    Allergy     Essential hypertension 2024    Fibrocystic breast     History of cholecystectomy     Personal history of colonic polyps 2024     Past Surgical History:   Procedure Laterality Date    BIOPSY  10/28/2020    BREAST BIOPSY Right 10/28/2020    Benign fibrotic breast tissue with fibroadenomatoid change and duct ectasia    CHOLECYSTECTOMY      COLONOSCOPY N/A 2024    Procedure: COLONOSCOPY;  Surgeon: Michelle Baker MD;  Location: [facility] ENDO;  Service: Endoscopy;  Laterality: N/A;  Referral: Vivi Stiles MD / Deo / Inst portal / LW   pt rescheduled, Suprep, portal -ml  - pt states she is not starting her Xarelto until after her colonoscopy / pc complete. DBM    ESOPHAGOGASTRODUODENOSCOPY N/A 2023    Procedure: EGD (ESOPHAGOGASTRODUODENOSCOPY);  Surgeon: Tom Landeros MD;  Location: [facility] ENDO;  Service: Endoscopy;  Laterality: N/A;    HERNIA REPAIR      VAGINAL DELIVERY       OB History          2    Para   2    Term   2            AB        Living             SAB        IAB        Ectopic        Multiple        Live Births               Obstetric Comments   Menopause @ age 57, denies HRT  Denies abnl pap, last pap  neg  Denies abnl  MMG,   c-scope in 2017.             Review of Systems   Constitutional: Positive for malaise/fatigue. Negative for chills, decreased appetite, diaphoresis, fever, night sweats, weight gain and weight loss.   Eyes: Negative.    Cardiovascular:  Positive for palpitations. Negative for chest pain, claudication, cyanosis, dyspnea on exertion, irregular heartbeat, leg swelling, near-syncope, orthopnea, paroxysmal nocturnal dyspnea and syncope.   Respiratory:  Negative for cough, hemoptysis and shortness of breath.    Endocrine: Negative.    Hematologic/Lymphatic: Negative.    Skin:  Negative for color change, dry skin and nail changes.   Musculoskeletal: Negative.    Gastrointestinal: Negative.    Genitourinary: Negative.    Neurological:  Negative for weakness.       Objective:   Last menstrual period 05/11/2017.body mass index is unknown because there is no height or weight on file.  Physical Exam  Not performed   Labs:    Chemistry        Component Value Date/Time     04/02/2025 0746     11/15/2024 0821    K 4.7 04/02/2025 0746    K 4.7 11/15/2024 0821     04/02/2025 0746     11/15/2024 0821    CO2 26 04/02/2025 0746    CO2 26 11/15/2024 0821    BUN 12 04/02/2025 0746    CREATININE 0.7 04/02/2025 0746    GLU 91 04/02/2025 0746    GLU 86 11/15/2024 0821        Component Value Date/Time    CALCIUM 9.0 04/02/2025 0746    CALCIUM 9.8 11/15/2024 0821    ALKPHOS 76 04/02/2025 0746    ALKPHOS 83 11/15/2024 0821    AST 16 04/02/2025 0746    AST 21 11/15/2024 0821    ALT 12 04/02/2025 0746    ALT 16 11/15/2024 0821    BILITOT 0.4 04/02/2025 0746    BILITOT 0.5 11/15/2024 0821    ESTGFRAFRICA >60.0 09/30/2021 0820    EGFRNONAA >60.0 09/30/2021 0820          Magnesium   Date Value Ref Range Status   11/15/2024 2.2 1.6 - 2.6 mg/dL Final     Lab Results   Component Value Date    WBC 4.04 04/02/2025    HGB 13.0 04/02/2025    HCT 41.4 04/02/2025     04/02/2025     Lab Results   Component Value Date     INR 0.9 10/23/2024     BNP   Date Value Ref Range Status   11/15/2024 14 0 - 99 pg/mL Final     Comment:     Values of less than 100 pg/ml are consistent with non-CHF populations.   06/27/2023 28 0 - 99 pg/mL Final     Comment:     Values of less than 100 pg/ml are consistent with non-CHF populations.       Assessment:      1. Palpitations    2. Essential hypertension    3. Mixed hyperlipidemia    4. Homozygous Factor V Leiden mutation        Plan:   Palpitations that seem likely related to PVC's and PACs in the setting of negative stress Echo, normal TSH and electrolytes within normal range (ie K and mag). Recommend a trial of low dose beta blockers. Will start Metoprolol succinate 25 mg at bedtime.   I do recommend a sleep study to r/o GAIL. I have put a referral for sleep consult.  Recommend 2 gram sodium restriction and 1500cc fluid restriction.  Encourage physical activity with graded exercise program.  Requested patient to weigh themselves daily, and to notify us if their weight increases by more than 3 lbs in 1 day or 5 lbs in 1 week.      Advance Care Planning  Patient has ACP docs in file.      Taty Bain MD

## 2025-06-27 NOTE — PROGRESS NOTES
[facility]  Progress Note    SUBJECTIVE:     Chief Complaint   Patient presents with    Annual Exam       HPI   Cloie Bodden Reyes  is a 57 y.o. female here for routine physical exam. She also has c/o left arm pain, which has been going on for the past 1-2 months. It hurts most in the arm and forearm muscles. No falls/trauma. Rate pain as moderate, with touching it also hurts.    Review of patient's allergies indicates:   Allergen Reactions    Ibuprofen Other (See Comments)     Leg and hand       Past Medical History:   Diagnosis Date    Allergy      Past Surgical History:   Procedure Laterality Date    HERNIA REPAIR       Family History   Problem Relation Age of Onset    Cancer Mother      kidney    Diabetes Father     Diabetes Sister     Stroke Brother     Clotting disorder Son     Diabetes Brother     No Known Problems Sister     No Known Problems Son      Social History   Substance Use Topics    Smoking status: Never Smoker    Smokeless tobacco: Never Used    Alcohol use No        Review of Systems   Constitutional: Negative for chills, diaphoresis and fever.   HENT: Negative for congestion, ear pain and sore throat.    Eyes: Negative for photophobia and discharge.   Respiratory: Negative for cough, shortness of breath and wheezing.    Cardiovascular: Negative for chest pain and palpitations.   Gastrointestinal: Negative for abdominal pain, constipation, diarrhea, nausea and vomiting.   Genitourinary: Negative for dysuria and hematuria.   Musculoskeletal: Positive for myalgias (left arm). Negative for back pain.   Skin: Negative for itching and rash.   Neurological: Negative for dizziness, sensory change, focal weakness, weakness and headaches.   All other systems reviewed and are negative.    OBJECTIVE:     Vitals:    04/13/18 0818   BP: 120/82   Pulse: 73   Temp: 98.3 °F (36.8 °C)     Body mass index is 33.48 kg/m².    Physical Exam   Constitutional: She is oriented to person, place,  and time and well-developed, well-nourished, and in no distress. No distress.   HENT:   Head: Normocephalic and atraumatic.   Right Ear: Tympanic membrane is not perforated, not erythematous and not bulging. No hemotympanum.   Left Ear: Tympanic membrane is not perforated, not erythematous and not bulging. No hemotympanum.   Mouth/Throat: Oropharynx is clear and moist. No oropharyngeal exudate.   Eyes: Conjunctivae and EOM are normal. Pupils are equal, round, and reactive to light.   Neck: No thyromegaly present.   Cardiovascular: Normal rate, regular rhythm and normal heart sounds.  Exam reveals no gallop and no friction rub.    No murmur heard.  Pulmonary/Chest: Effort normal and breath sounds normal. No respiratory distress. She has no wheezes. She has no rales.   Abdominal: Soft. Bowel sounds are normal. She exhibits no distension. There is no tenderness. There is no rebound and no guarding.   Musculoskeletal: Normal range of motion. She exhibits tenderness (some tenderness to palpation of left upper extremity, around mid arm level, and mid forearm level. no obvious fractures, dislocaitons, or any skin changes. no actual joint pain. good ROM and strength.). She exhibits no edema.   Lymphadenopathy:     She has no cervical adenopathy.   Neurological: She is alert and oriented to person, place, and time.   Skin: Skin is warm. No rash noted. She is not diaphoretic. No erythema.       Old records were reviewed. Labs and/or images were independently reviewed.    ASSESSMENT     1. Left arm pain    2. Dermatitis    3. Encounter for screening mammogram for breast cancer    4. Encounter for gynecological examination without abnormal finding    5. Need for hepatitis C screening test        PLAN:     Left arm pain  -     CBC auto differential; Future  -     Comprehensive metabolic panel; Future  -     Hemoglobin A1c; Future  -     Lipid panel; Future  -     TSH; Future  -     T4, free; Future  -     methylPREDNISolone  (MEDROL DOSEPACK) 4 mg tablet; use as directed  Dispense: 1 Package; Refill: 0  -     Likely musculoskeletal. Will try medrol dose pack due to allergy to ibuprofen.     Dermatitis  -     triamcinolone acetonide 0.1% (KENALOG) 0.1 % cream; Apply topically 2 (two) times daily.  Dispense: 45 g; Refill: 0    Encounter for screening mammogram for breast cancer  -     Mammo Digital Screening Bilat with CAD; Future; Expected date: 04/13/2018    Encounter for gynecological examination without abnormal finding  -     Ambulatory referral to Obstetrics / Gynecology    Need for hepatitis C screening test  -     Hepatitis C antibody; Future; Expected date: 04/13/2018      RTC OLIVER Green MD  04/13/2018 8:38 AM       Opt out